# Patient Record
Sex: FEMALE | Race: WHITE | NOT HISPANIC OR LATINO
[De-identification: names, ages, dates, MRNs, and addresses within clinical notes are randomized per-mention and may not be internally consistent; named-entity substitution may affect disease eponyms.]

---

## 2017-01-31 ENCOUNTER — RX RENEWAL (OUTPATIENT)
Age: 82
End: 2017-01-31

## 2017-02-17 ENCOUNTER — NON-APPOINTMENT (OUTPATIENT)
Age: 82
End: 2017-02-17

## 2017-02-17 ENCOUNTER — APPOINTMENT (OUTPATIENT)
Dept: GERIATRICS | Facility: CLINIC | Age: 82
End: 2017-02-17

## 2017-02-17 VITALS
HEIGHT: 62 IN | TEMPERATURE: 98.7 F | SYSTOLIC BLOOD PRESSURE: 140 MMHG | BODY MASS INDEX: 25.95 KG/M2 | DIASTOLIC BLOOD PRESSURE: 80 MMHG | HEART RATE: 61 BPM | RESPIRATION RATE: 15 BRPM | WEIGHT: 141 LBS | OXYGEN SATURATION: 97 %

## 2017-02-17 DIAGNOSIS — F39 UNSPECIFIED MOOD [AFFECTIVE] DISORDER: ICD-10-CM

## 2017-02-18 ENCOUNTER — INPATIENT (INPATIENT)
Facility: HOSPITAL | Age: 82
LOS: 0 days | Discharge: HOME | DRG: 880 | End: 2017-02-19
Attending: HOSPITALIST | Admitting: HOSPITALIST
Payer: COMMERCIAL

## 2017-02-18 DIAGNOSIS — Z87.891 PERSONAL HISTORY OF NICOTINE DEPENDENCE: ICD-10-CM

## 2017-02-18 DIAGNOSIS — E03.9 HYPOTHYROIDISM, UNSPECIFIED: ICD-10-CM

## 2017-02-18 DIAGNOSIS — F41.0 PANIC DISORDER [EPISODIC PAROXYSMAL ANXIETY]: ICD-10-CM

## 2017-02-18 DIAGNOSIS — F03.90 UNSPECIFIED DEMENTIA WITHOUT BEHAVIORAL DISTURBANCE: ICD-10-CM

## 2017-02-18 DIAGNOSIS — F32.9 MAJOR DEPRESSIVE DISORDER, SINGLE EPISODE, UNSPECIFIED: ICD-10-CM

## 2017-02-18 DIAGNOSIS — F41.9 ANXIETY DISORDER, UNSPECIFIED: ICD-10-CM

## 2017-02-18 DIAGNOSIS — R47.89 OTHER SPEECH DISTURBANCES: ICD-10-CM

## 2017-02-18 PROCEDURE — 99223 1ST HOSP IP/OBS HIGH 75: CPT | Mod: AI

## 2017-02-18 PROCEDURE — 70450 CT HEAD/BRAIN W/O DYE: CPT | Mod: 26

## 2017-02-18 PROCEDURE — 71010: CPT | Mod: 26

## 2017-02-19 PROCEDURE — 83036 HEMOGLOBIN GLYCOSYLATED A1C: CPT

## 2017-02-19 PROCEDURE — 85730 THROMBOPLASTIN TIME PARTIAL: CPT

## 2017-02-19 PROCEDURE — 96374 THER/PROPH/DIAG INJ IV PUSH: CPT

## 2017-02-19 PROCEDURE — 96376 TX/PRO/DX INJ SAME DRUG ADON: CPT

## 2017-02-19 PROCEDURE — 71045 X-RAY EXAM CHEST 1 VIEW: CPT

## 2017-02-19 PROCEDURE — 80048 BASIC METABOLIC PNL TOTAL CA: CPT

## 2017-02-19 PROCEDURE — 93005 ELECTROCARDIOGRAM TRACING: CPT

## 2017-02-19 PROCEDURE — 82962 GLUCOSE BLOOD TEST: CPT

## 2017-02-19 PROCEDURE — 82550 ASSAY OF CK (CPK): CPT

## 2017-02-19 PROCEDURE — 96375 TX/PRO/DX INJ NEW DRUG ADDON: CPT

## 2017-02-19 PROCEDURE — 99239 HOSP IP/OBS DSCHRG MGMT >30: CPT

## 2017-02-19 PROCEDURE — 87086 URINE CULTURE/COLONY COUNT: CPT

## 2017-02-19 PROCEDURE — 85027 COMPLETE CBC AUTOMATED: CPT

## 2017-02-19 PROCEDURE — 81003 URINALYSIS AUTO W/O SCOPE: CPT

## 2017-02-19 PROCEDURE — 99285 EMERGENCY DEPT VISIT HI MDM: CPT | Mod: 25

## 2017-02-19 PROCEDURE — 70450 CT HEAD/BRAIN W/O DYE: CPT

## 2017-02-19 PROCEDURE — 85610 PROTHROMBIN TIME: CPT

## 2017-02-19 PROCEDURE — 84484 ASSAY OF TROPONIN QUANT: CPT

## 2017-02-19 PROCEDURE — 93010 ELECTROCARDIOGRAM REPORT: CPT

## 2017-02-22 ENCOUNTER — APPOINTMENT (OUTPATIENT)
Dept: GERIATRICS | Facility: CLINIC | Age: 82
End: 2017-02-22

## 2017-02-22 VITALS
RESPIRATION RATE: 15 BRPM | OXYGEN SATURATION: 97 % | BODY MASS INDEX: 26.87 KG/M2 | TEMPERATURE: 97.9 F | HEART RATE: 85 BPM | HEIGHT: 62 IN | DIASTOLIC BLOOD PRESSURE: 78 MMHG | WEIGHT: 146 LBS | SYSTOLIC BLOOD PRESSURE: 160 MMHG

## 2017-02-22 RX ORDER — SERTRALINE 25 MG/1
25 TABLET, FILM COATED ORAL
Qty: 30 | Refills: 1 | Status: DISCONTINUED | COMMUNITY
Start: 2017-02-17 | End: 2017-02-22

## 2017-02-23 LAB
25(OH)D3 SERPL-MCNC: 30.3 NG/ML
ANION GAP SERPL CALC-SCNC: 18 MMOL/L
APPEARANCE: CLEAR
BASOPHILS # BLD AUTO: 0.1 K/UL
BASOPHILS NFR BLD AUTO: 0.7 %
BILIRUBIN URINE: NEGATIVE
BLOOD URINE: NEGATIVE
BUN SERPL-MCNC: 14 MG/DL
CALCIUM SERPL-MCNC: 10.3 MG/DL
CHLORIDE SERPL-SCNC: 99 MMOL/L
CHOLEST SERPL-MCNC: 271 MG/DL
CHOLEST/HDLC SERPL: 4.2 RATIO
CO2 SERPL-SCNC: 25 MMOL/L
COLOR: YELLOW
CREAT SERPL-MCNC: 1.04 MG/DL
EOSINOPHIL # BLD AUTO: 0.18 K/UL
EOSINOPHIL NFR BLD AUTO: 1.3 %
FOLATE SERPL-MCNC: 10 NG/ML
GLUCOSE QUALITATIVE U: NORMAL MG/DL
GLUCOSE SERPL-MCNC: 97 MG/DL
HBA1C MFR BLD HPLC: 5.7 %
HCT VFR BLD CALC: 41.2 %
HDLC SERPL-MCNC: 65 MG/DL
HGB BLD-MCNC: 13.4 G/DL
IMM GRANULOCYTES NFR BLD AUTO: 0.2 %
KETONES URINE: NEGATIVE
LDLC SERPL CALC-MCNC: 162 MG/DL
LEUKOCYTE ESTERASE URINE: NEGATIVE
LYMPHOCYTES # BLD AUTO: 3.17 K/UL
LYMPHOCYTES NFR BLD AUTO: 23.7 %
MAGNESIUM SERPL-MCNC: 2.4 MG/DL
MAN DIFF?: NORMAL
MCHC RBC-ENTMCNC: 29.3 PG
MCHC RBC-ENTMCNC: 32.5 GM/DL
MCV RBC AUTO: 90.2 FL
MONOCYTES # BLD AUTO: 0.96 K/UL
MONOCYTES NFR BLD AUTO: 7.2 %
NEUTROPHILS # BLD AUTO: 8.91 K/UL
NEUTROPHILS NFR BLD AUTO: 66.9 %
NITRITE URINE: NEGATIVE
PH URINE: 7
PLATELET # BLD AUTO: 380 K/UL
POTASSIUM SERPL-SCNC: 4.4 MMOL/L
PROTEIN URINE: NEGATIVE MG/DL
RBC # BLD: 4.57 M/UL
RBC # FLD: 14.7 %
SODIUM SERPL-SCNC: 142 MMOL/L
SPECIFIC GRAVITY URINE: 1.01
TRIGL SERPL-MCNC: 218 MG/DL
TSH SERPL-ACNC: 3.71 UIU/ML
UROBILINOGEN URINE: NORMAL MG/DL
VIT B12 SERPL-MCNC: 536 PG/ML
WBC # FLD AUTO: 13.35 K/UL

## 2017-03-06 ENCOUNTER — APPOINTMENT (OUTPATIENT)
Dept: GERIATRICS | Facility: CLINIC | Age: 82
End: 2017-03-06

## 2017-03-06 VITALS
DIASTOLIC BLOOD PRESSURE: 80 MMHG | OXYGEN SATURATION: 98 % | BODY MASS INDEX: 26.13 KG/M2 | HEART RATE: 83 BPM | SYSTOLIC BLOOD PRESSURE: 140 MMHG | WEIGHT: 142 LBS | RESPIRATION RATE: 15 BRPM | TEMPERATURE: 98.6 F | HEIGHT: 62 IN

## 2017-03-06 DIAGNOSIS — G45.9 TRANSIENT CEREBRAL ISCHEMIC ATTACK, UNSPECIFIED: ICD-10-CM

## 2017-03-06 RX ORDER — ZOLPIDEM TARTRATE 5 MG/1
5 TABLET ORAL
Qty: 10 | Refills: 0 | Status: DISCONTINUED | COMMUNITY
Start: 2016-12-05

## 2017-03-06 RX ORDER — MIRTAZAPINE 7.5 MG/1
7.5 TABLET, FILM COATED ORAL
Qty: 15 | Refills: 0 | Status: DISCONTINUED | COMMUNITY
Start: 2017-02-02

## 2017-03-06 RX ORDER — DONEPEZIL HYDROCHLORIDE 5 MG/1
5 TABLET ORAL
Qty: 30 | Refills: 0 | Status: DISCONTINUED | COMMUNITY
Start: 2017-02-24

## 2017-03-09 ENCOUNTER — OUTPATIENT (OUTPATIENT)
Dept: OUTPATIENT SERVICES | Facility: HOSPITAL | Age: 82
LOS: 1 days | Discharge: HOME | End: 2017-03-09
Payer: MEDICARE

## 2017-03-09 PROCEDURE — 90792 PSYCH DIAG EVAL W/MED SRVCS: CPT

## 2017-03-10 DIAGNOSIS — F33.2 MAJOR DEPRESSIVE DISORDER, RECURRENT SEVERE WITHOUT PSYCHOTIC FEATURES: ICD-10-CM

## 2017-03-11 ENCOUNTER — APPOINTMENT (OUTPATIENT)
Dept: ULTRASOUND IMAGING | Facility: CLINIC | Age: 82
End: 2017-03-11

## 2017-03-11 ENCOUNTER — OUTPATIENT (OUTPATIENT)
Dept: OUTPATIENT SERVICES | Facility: HOSPITAL | Age: 82
LOS: 1 days | End: 2017-03-11
Payer: MEDICARE

## 2017-03-11 DIAGNOSIS — Z00.8 ENCOUNTER FOR OTHER GENERAL EXAMINATION: ICD-10-CM

## 2017-03-11 PROCEDURE — 93880 EXTRACRANIAL BILAT STUDY: CPT

## 2017-03-24 PROCEDURE — 99214 OFFICE O/P EST MOD 30 MIN: CPT

## 2017-04-03 ENCOUNTER — APPOINTMENT (OUTPATIENT)
Dept: GERIATRICS | Facility: CLINIC | Age: 82
End: 2017-04-03

## 2017-04-03 VITALS
OXYGEN SATURATION: 97 % | HEART RATE: 64 BPM | BODY MASS INDEX: 25.76 KG/M2 | RESPIRATION RATE: 15 BRPM | WEIGHT: 140 LBS | TEMPERATURE: 97.6 F | DIASTOLIC BLOOD PRESSURE: 80 MMHG | SYSTOLIC BLOOD PRESSURE: 152 MMHG | HEIGHT: 62 IN

## 2017-04-03 DIAGNOSIS — R61 GENERALIZED HYPERHIDROSIS: ICD-10-CM

## 2017-04-03 RX ORDER — MIRTAZAPINE 15 MG/1
15 TABLET, FILM COATED ORAL
Qty: 30 | Refills: 5 | Status: DISCONTINUED | COMMUNITY
Start: 2017-02-17 | End: 2017-04-03

## 2017-04-14 PROCEDURE — 99214 OFFICE O/P EST MOD 30 MIN: CPT

## 2017-06-14 ENCOUNTER — APPOINTMENT (OUTPATIENT)
Dept: GERIATRICS | Facility: CLINIC | Age: 82
End: 2017-06-14

## 2017-06-14 VITALS
SYSTOLIC BLOOD PRESSURE: 190 MMHG | RESPIRATION RATE: 15 BRPM | HEIGHT: 62 IN | DIASTOLIC BLOOD PRESSURE: 100 MMHG | OXYGEN SATURATION: 97 % | HEART RATE: 67 BPM | WEIGHT: 135.03 LBS | BODY MASS INDEX: 24.85 KG/M2 | TEMPERATURE: 97.9 F

## 2017-06-15 LAB
ANION GAP SERPL CALC-SCNC: 14 MMOL/L
BASOPHILS # BLD AUTO: 0.05 K/UL
BASOPHILS NFR BLD AUTO: 0.4 %
BUN SERPL-MCNC: 20 MG/DL
CALCIUM SERPL-MCNC: 9.7 MG/DL
CHLORIDE SERPL-SCNC: 100 MMOL/L
CHOLEST SERPL-MCNC: 241 MG/DL
CHOLEST/HDLC SERPL: 4 RATIO
CO2 SERPL-SCNC: 28 MMOL/L
CREAT SERPL-MCNC: 0.96 MG/DL
EOSINOPHIL # BLD AUTO: 0.16 K/UL
EOSINOPHIL NFR BLD AUTO: 1.4 %
GLUCOSE SERPL-MCNC: 89 MG/DL
HCT VFR BLD CALC: 37.9 %
HDLC SERPL-MCNC: 60 MG/DL
HGB BLD-MCNC: 12.4 G/DL
IMM GRANULOCYTES NFR BLD AUTO: 0.3 %
LDLC SERPL CALC-MCNC: 148 MG/DL
LYMPHOCYTES # BLD AUTO: 2.32 K/UL
LYMPHOCYTES NFR BLD AUTO: 20.2 %
MAN DIFF?: NORMAL
MCHC RBC-ENTMCNC: 29.1 PG
MCHC RBC-ENTMCNC: 32.7 GM/DL
MCV RBC AUTO: 89 FL
MONOCYTES # BLD AUTO: 0.74 K/UL
MONOCYTES NFR BLD AUTO: 6.4 %
NEUTROPHILS # BLD AUTO: 8.19 K/UL
NEUTROPHILS NFR BLD AUTO: 71.3 %
PLATELET # BLD AUTO: 310 K/UL
POTASSIUM SERPL-SCNC: 4.4 MMOL/L
RBC # BLD: 4.26 M/UL
RBC # FLD: 14 %
SODIUM SERPL-SCNC: 142 MMOL/L
TRIGL SERPL-MCNC: 165 MG/DL
TSH SERPL-ACNC: 3.77 UIU/ML
WBC # FLD AUTO: 11.49 K/UL

## 2017-07-07 ENCOUNTER — RX RENEWAL (OUTPATIENT)
Age: 82
End: 2017-07-07

## 2017-07-14 ENCOUNTER — APPOINTMENT (OUTPATIENT)
Dept: UROGYNECOLOGY | Facility: CLINIC | Age: 82
End: 2017-07-14

## 2017-07-14 VITALS
DIASTOLIC BLOOD PRESSURE: 72 MMHG | BODY MASS INDEX: 27.6 KG/M2 | WEIGHT: 150 LBS | SYSTOLIC BLOOD PRESSURE: 138 MMHG | HEIGHT: 62 IN

## 2017-07-14 DIAGNOSIS — Z78.9 OTHER SPECIFIED HEALTH STATUS: ICD-10-CM

## 2017-07-14 DIAGNOSIS — Z87.19 PERSONAL HISTORY OF OTHER DISEASES OF THE DIGESTIVE SYSTEM: ICD-10-CM

## 2017-07-14 DIAGNOSIS — Z80.3 FAMILY HISTORY OF MALIGNANT NEOPLASM OF BREAST: ICD-10-CM

## 2017-07-14 DIAGNOSIS — N95.2 POSTMENOPAUSAL ATROPHIC VAGINITIS: ICD-10-CM

## 2017-07-14 DIAGNOSIS — N36.41 HYPERMOBILITY OF URETHRA: ICD-10-CM

## 2017-07-14 RX ORDER — MIRTAZAPINE 30 MG/1
30 TABLET, FILM COATED ORAL
Qty: 30 | Refills: 0 | Status: DISCONTINUED | COMMUNITY
Start: 2017-02-17 | End: 2017-07-14

## 2017-07-14 RX ORDER — TRAZODONE HYDROCHLORIDE 100 MG/1
100 TABLET ORAL
Qty: 30 | Refills: 0 | Status: DISCONTINUED | COMMUNITY
Start: 2017-03-09 | End: 2017-07-14

## 2017-08-14 ENCOUNTER — APPOINTMENT (OUTPATIENT)
Dept: UROGYNECOLOGY | Facility: CLINIC | Age: 82
End: 2017-08-14
Payer: MEDICARE

## 2017-08-14 ENCOUNTER — RESULT REVIEW (OUTPATIENT)
Age: 82
End: 2017-08-14

## 2017-08-14 ENCOUNTER — OUTPATIENT (OUTPATIENT)
Dept: OUTPATIENT SERVICES | Facility: HOSPITAL | Age: 82
LOS: 1 days | End: 2017-08-14
Payer: MEDICARE

## 2017-08-14 DIAGNOSIS — Z01.818 ENCOUNTER FOR OTHER PREPROCEDURAL EXAMINATION: ICD-10-CM

## 2017-08-14 DIAGNOSIS — N39.3 STRESS INCONTINENCE (FEMALE) (MALE): ICD-10-CM

## 2017-08-14 LAB
BILIRUB UR QL STRIP: NORMAL
CLARITY UR: CLEAR
COLLECTION METHOD: NORMAL
GLUCOSE UR-MCNC: NORMAL
HCG UR QL: 0.2 EU/DL
HGB UR QL STRIP.AUTO: NORMAL
KETONES UR-MCNC: NORMAL
LEUKOCYTE ESTERASE UR QL STRIP: NORMAL
NITRITE UR QL STRIP: NORMAL
PH UR STRIP: 6.5
PROT UR STRIP-MCNC: NORMAL
SP GR UR STRIP: 1.01

## 2017-08-14 PROCEDURE — 51797 INTRAABDOMINAL PRESSURE TEST: CPT

## 2017-08-14 PROCEDURE — 51729 CYSTOMETROGRAM W/VP&UP: CPT

## 2017-08-14 PROCEDURE — 51784 ANAL/URINARY MUSCLE STUDY: CPT | Mod: 26

## 2017-08-14 PROCEDURE — 51797 INTRAABDOMINAL PRESSURE TEST: CPT | Mod: 26

## 2017-08-14 PROCEDURE — 51784 ANAL/URINARY MUSCLE STUDY: CPT

## 2017-08-14 PROCEDURE — 51729 CYSTOMETROGRAM W/VP&UP: CPT | Mod: 26

## 2017-08-15 DIAGNOSIS — N39.3 STRESS INCONTINENCE (FEMALE) (MALE): ICD-10-CM

## 2017-08-15 DIAGNOSIS — N32.81 OVERACTIVE BLADDER: ICD-10-CM

## 2017-08-23 ENCOUNTER — APPOINTMENT (OUTPATIENT)
Dept: UROGYNECOLOGY | Facility: CLINIC | Age: 82
End: 2017-08-23
Payer: MEDICARE

## 2017-08-23 DIAGNOSIS — N39.3 STRESS INCONTINENCE (FEMALE) (MALE): ICD-10-CM

## 2017-08-23 DIAGNOSIS — N32.81 OVERACTIVE BLADDER: ICD-10-CM

## 2017-08-23 PROCEDURE — 99213 OFFICE O/P EST LOW 20 MIN: CPT

## 2017-08-31 ENCOUNTER — APPOINTMENT (OUTPATIENT)
Dept: GERIATRICS | Facility: CLINIC | Age: 82
End: 2017-08-31
Payer: MEDICARE

## 2017-08-31 VITALS
SYSTOLIC BLOOD PRESSURE: 140 MMHG | BODY MASS INDEX: 24.69 KG/M2 | OXYGEN SATURATION: 97 % | HEART RATE: 70 BPM | TEMPERATURE: 98.4 F | RESPIRATION RATE: 16 BRPM | WEIGHT: 135 LBS | DIASTOLIC BLOOD PRESSURE: 80 MMHG

## 2017-08-31 DIAGNOSIS — N39.46 MIXED INCONTINENCE: ICD-10-CM

## 2017-08-31 PROCEDURE — 99214 OFFICE O/P EST MOD 30 MIN: CPT | Mod: GC

## 2017-08-31 RX ORDER — ASPIRIN 81 MG/1
81 TABLET, COATED ORAL DAILY
Qty: 1 | Refills: 0 | Status: ACTIVE | COMMUNITY
Start: 2017-04-03

## 2017-09-06 LAB
BASOPHILS # BLD AUTO: 0.08 K/UL
BASOPHILS NFR BLD AUTO: 0.7 %
EOSINOPHIL # BLD AUTO: 0.12 K/UL
EOSINOPHIL NFR BLD AUTO: 1 %
HCT VFR BLD CALC: 38.9 %
HGB BLD-MCNC: 12.5 G/DL
IMM GRANULOCYTES NFR BLD AUTO: 0.3 %
LYMPHOCYTES # BLD AUTO: 2.51 K/UL
LYMPHOCYTES NFR BLD AUTO: 21.3 %
MAN DIFF?: NORMAL
MCHC RBC-ENTMCNC: 28.3 PG
MCHC RBC-ENTMCNC: 32.1 GM/DL
MCV RBC AUTO: 88 FL
MONOCYTES # BLD AUTO: 0.84 K/UL
MONOCYTES NFR BLD AUTO: 7.1 %
NEUTROPHILS # BLD AUTO: 8.22 K/UL
NEUTROPHILS NFR BLD AUTO: 69.6 %
PLATELET # BLD AUTO: 322 K/UL
RBC # BLD: 4.42 M/UL
RBC # FLD: 13.8 %
WBC # FLD AUTO: 11.81 K/UL

## 2017-09-21 ENCOUNTER — RX RENEWAL (OUTPATIENT)
Age: 82
End: 2017-09-21

## 2017-09-28 PROCEDURE — 99214 OFFICE O/P EST MOD 30 MIN: CPT

## 2017-10-13 ENCOUNTER — RX RENEWAL (OUTPATIENT)
Age: 82
End: 2017-10-13

## 2017-10-30 PROCEDURE — 99214 OFFICE O/P EST MOD 30 MIN: CPT

## 2017-11-13 ENCOUNTER — APPOINTMENT (OUTPATIENT)
Dept: GERIATRICS | Facility: CLINIC | Age: 82
End: 2017-11-13

## 2017-11-20 ENCOUNTER — APPOINTMENT (OUTPATIENT)
Dept: GERIATRICS | Facility: CLINIC | Age: 82
End: 2017-11-20
Payer: MEDICARE

## 2017-11-20 VITALS
SYSTOLIC BLOOD PRESSURE: 130 MMHG | OXYGEN SATURATION: 98 % | TEMPERATURE: 97.8 F | HEIGHT: 62 IN | DIASTOLIC BLOOD PRESSURE: 70 MMHG | BODY MASS INDEX: 24.84 KG/M2 | WEIGHT: 135 LBS | RESPIRATION RATE: 16 BRPM | HEART RATE: 64 BPM

## 2017-11-20 PROCEDURE — 90662 IIV NO PRSV INCREASED AG IM: CPT

## 2017-11-20 PROCEDURE — 99214 OFFICE O/P EST MOD 30 MIN: CPT | Mod: 25

## 2017-11-20 PROCEDURE — G0008: CPT

## 2017-11-20 RX ORDER — ATORVASTATIN CALCIUM 20 MG/1
20 TABLET, FILM COATED ORAL
Qty: 30 | Refills: 3 | Status: DISCONTINUED | COMMUNITY
Start: 2017-08-31 | End: 2017-11-20

## 2017-11-22 ENCOUNTER — MED ADMIN CHARGE (OUTPATIENT)
Age: 82
End: 2017-11-22

## 2017-11-30 PROCEDURE — 99214 OFFICE O/P EST MOD 30 MIN: CPT

## 2017-12-05 ENCOUNTER — APPOINTMENT (OUTPATIENT)
Dept: GERIATRICS | Facility: ASSISTED LIVING FACILITY | Age: 82
End: 2017-12-05

## 2017-12-13 ENCOUNTER — TRANSCRIPTION ENCOUNTER (OUTPATIENT)
Age: 82
End: 2017-12-13

## 2018-01-08 ENCOUNTER — APPOINTMENT (OUTPATIENT)
Dept: GERIATRICS | Facility: CLINIC | Age: 83
End: 2018-01-08
Payer: MEDICARE

## 2018-01-08 VITALS
BODY MASS INDEX: 27.08 KG/M2 | WEIGHT: 147.13 LBS | SYSTOLIC BLOOD PRESSURE: 140 MMHG | TEMPERATURE: 98.6 F | HEIGHT: 62 IN | DIASTOLIC BLOOD PRESSURE: 80 MMHG | RESPIRATION RATE: 16 BRPM | OXYGEN SATURATION: 97 % | HEART RATE: 72 BPM

## 2018-01-08 PROCEDURE — 99214 OFFICE O/P EST MOD 30 MIN: CPT

## 2018-01-12 ENCOUNTER — RESULT REVIEW (OUTPATIENT)
Age: 83
End: 2018-01-12

## 2018-01-12 LAB
ANION GAP SERPL CALC-SCNC: 12 MMOL/L
BASOPHILS # BLD AUTO: 0.07 K/UL
BASOPHILS NFR BLD AUTO: 0.7 %
BUN SERPL-MCNC: 21 MG/DL
CALCIUM SERPL-MCNC: 9.8 MG/DL
CHLORIDE SERPL-SCNC: 97 MMOL/L
CHOLEST SERPL-MCNC: 316 MG/DL
CHOLEST/HDLC SERPL: 5.1 RATIO
CO2 SERPL-SCNC: 31 MMOL/L
CREAT SERPL-MCNC: 1.24 MG/DL
EOSINOPHIL # BLD AUTO: 0.18 K/UL
EOSINOPHIL NFR BLD AUTO: 1.7 %
GLUCOSE SERPL-MCNC: 108 MG/DL
HCT VFR BLD CALC: 36.7 %
HDLC SERPL-MCNC: 62 MG/DL
HGB BLD-MCNC: 12 G/DL
IMM GRANULOCYTES NFR BLD AUTO: 0.4 %
LDLC SERPL CALC-MCNC: 190 MG/DL
LYMPHOCYTES # BLD AUTO: 2.72 K/UL
LYMPHOCYTES NFR BLD AUTO: 26.1 %
MAN DIFF?: NORMAL
MCHC RBC-ENTMCNC: 30.5 PG
MCHC RBC-ENTMCNC: 32.7 GM/DL
MCV RBC AUTO: 93.4 FL
MONOCYTES # BLD AUTO: 0.82 K/UL
MONOCYTES NFR BLD AUTO: 7.9 %
NEUTROPHILS # BLD AUTO: 6.6 K/UL
NEUTROPHILS NFR BLD AUTO: 63.2 %
PLATELET # BLD AUTO: 267 K/UL
POTASSIUM SERPL-SCNC: 4.4 MMOL/L
RBC # BLD: 3.93 M/UL
RBC # FLD: 14.7 %
SODIUM SERPL-SCNC: 140 MMOL/L
TRIGL SERPL-MCNC: 322 MG/DL
WBC # FLD AUTO: 10.43 K/UL

## 2018-01-23 ENCOUNTER — TRANSCRIPTION ENCOUNTER (OUTPATIENT)
Age: 83
End: 2018-01-23

## 2018-02-05 ENCOUNTER — RX RENEWAL (OUTPATIENT)
Age: 83
End: 2018-02-05

## 2018-02-12 ENCOUNTER — TRANSCRIPTION ENCOUNTER (OUTPATIENT)
Age: 83
End: 2018-02-12

## 2018-02-21 ENCOUNTER — OTHER (OUTPATIENT)
Age: 83
End: 2018-02-21

## 2018-02-21 RX ORDER — GABAPENTIN 300 MG/1
300 CAPSULE ORAL
Qty: 30 | Refills: 0 | Status: DISCONTINUED | COMMUNITY
Start: 2017-12-14 | End: 2018-02-21

## 2018-04-09 ENCOUNTER — APPOINTMENT (OUTPATIENT)
Dept: GERIATRICS | Facility: CLINIC | Age: 83
End: 2018-04-09
Payer: MEDICARE

## 2018-04-09 VITALS
SYSTOLIC BLOOD PRESSURE: 170 MMHG | OXYGEN SATURATION: 97 % | RESPIRATION RATE: 16 BRPM | WEIGHT: 140 LBS | HEART RATE: 72 BPM | DIASTOLIC BLOOD PRESSURE: 90 MMHG | BODY MASS INDEX: 25.76 KG/M2 | HEIGHT: 62 IN | TEMPERATURE: 98.3 F

## 2018-04-09 PROCEDURE — 99214 OFFICE O/P EST MOD 30 MIN: CPT | Mod: GC

## 2018-04-09 RX ORDER — TRAZODONE HYDROCHLORIDE 50 MG/1
50 TABLET ORAL
Qty: 60 | Refills: 0 | Status: DISCONTINUED | COMMUNITY
Start: 2017-11-20

## 2018-04-09 RX ORDER — DULOXETINE HYDROCHLORIDE 60 MG/1
60 CAPSULE, DELAYED RELEASE PELLETS ORAL
Qty: 60 | Refills: 0 | Status: DISCONTINUED | COMMUNITY
Start: 2017-11-30

## 2018-04-09 RX ORDER — NYSTATIN 100000 1/G
100000 POWDER TOPICAL
Qty: 1 | Refills: 0 | Status: DISCONTINUED | COMMUNITY
Start: 2017-04-03 | End: 2018-04-09

## 2018-04-09 RX ORDER — AMOXICILLIN 875 MG/1
875 TABLET, FILM COATED ORAL
Qty: 20 | Refills: 0 | Status: DISCONTINUED | COMMUNITY
Start: 2018-02-12

## 2018-04-09 RX ORDER — VALACYCLOVIR 1 G/1
1 TABLET, FILM COATED ORAL
Qty: 21 | Refills: 0 | Status: DISCONTINUED | COMMUNITY
Start: 2017-12-05

## 2018-04-09 RX ORDER — EZETIMIBE 10 MG/1
10 TABLET ORAL DAILY
Qty: 30 | Refills: 3 | Status: DISCONTINUED | COMMUNITY
Start: 2018-01-12 | End: 2018-04-09

## 2018-04-13 LAB
ALBUMIN SERPL ELPH-MCNC: 4 G/DL
ALP BLD-CCNC: 48 U/L
ALT SERPL-CCNC: 8 U/L
ANION GAP SERPL CALC-SCNC: 11 MMOL/L
AST SERPL-CCNC: 14 U/L
BASOPHILS # BLD AUTO: 0.07 K/UL
BASOPHILS NFR BLD AUTO: 0.6 %
BILIRUB SERPL-MCNC: 0.6 MG/DL
BUN SERPL-MCNC: 18 MG/DL
CALCIUM SERPL-MCNC: 9.5 MG/DL
CHLORIDE SERPL-SCNC: 103 MMOL/L
CHOLEST SERPL-MCNC: 225 MG/DL
CHOLEST/HDLC SERPL: 3.9 RATIO
CO2 SERPL-SCNC: 26 MMOL/L
CREAT SERPL-MCNC: 1.11 MG/DL
EOSINOPHIL # BLD AUTO: 0.09 K/UL
EOSINOPHIL NFR BLD AUTO: 0.8 %
GLUCOSE SERPL-MCNC: 101 MG/DL
HCT VFR BLD CALC: 39.2 %
HDLC SERPL-MCNC: 58 MG/DL
HGB BLD-MCNC: 12.7 G/DL
IMM GRANULOCYTES NFR BLD AUTO: 0.4 %
LDLC SERPL CALC-MCNC: 141 MG/DL
LYMPHOCYTES # BLD AUTO: 2.66 K/UL
LYMPHOCYTES NFR BLD AUTO: 24.2 %
MAN DIFF?: NORMAL
MCHC RBC-ENTMCNC: 29.9 PG
MCHC RBC-ENTMCNC: 32.4 GM/DL
MCV RBC AUTO: 92.2 FL
MONOCYTES # BLD AUTO: 0.82 K/UL
MONOCYTES NFR BLD AUTO: 7.5 %
NEUTROPHILS # BLD AUTO: 7.3 K/UL
NEUTROPHILS NFR BLD AUTO: 66.5 %
PLATELET # BLD AUTO: 351 K/UL
POTASSIUM SERPL-SCNC: 4.3 MMOL/L
PROT SERPL-MCNC: 7 G/DL
RBC # BLD: 4.25 M/UL
RBC # FLD: 13.1 %
SODIUM SERPL-SCNC: 140 MMOL/L
TRIGL SERPL-MCNC: 130 MG/DL
TSH SERPL-ACNC: 8.29 UIU/ML
WBC # FLD AUTO: 10.98 K/UL

## 2018-04-16 ENCOUNTER — RX RENEWAL (OUTPATIENT)
Age: 83
End: 2018-04-16

## 2018-06-11 ENCOUNTER — APPOINTMENT (OUTPATIENT)
Dept: GERIATRICS | Facility: CLINIC | Age: 83
End: 2018-06-11
Payer: MEDICARE

## 2018-06-11 VITALS
BODY MASS INDEX: 24.47 KG/M2 | OXYGEN SATURATION: 97 % | TEMPERATURE: 98.4 F | HEART RATE: 64 BPM | SYSTOLIC BLOOD PRESSURE: 160 MMHG | WEIGHT: 133.8 LBS | DIASTOLIC BLOOD PRESSURE: 80 MMHG

## 2018-06-11 DIAGNOSIS — Z23 ENCOUNTER FOR IMMUNIZATION: ICD-10-CM

## 2018-06-11 DIAGNOSIS — Z86.39 PERSONAL HISTORY OF OTHER ENDOCRINE, NUTRITIONAL AND METABOLIC DISEASE: ICD-10-CM

## 2018-06-11 DIAGNOSIS — Z87.39 PERSONAL HISTORY OF OTHER DISEASES OF THE MUSCULOSKELETAL SYSTEM AND CONNECTIVE TISSUE: ICD-10-CM

## 2018-06-11 DIAGNOSIS — Z92.29 PERSONAL HISTORY OF OTHER DRUG THERAPY: ICD-10-CM

## 2018-06-11 DIAGNOSIS — B02.29 OTHER POSTHERPETIC NERVOUS SYSTEM INVOLVEMENT: ICD-10-CM

## 2018-06-11 PROCEDURE — 99214 OFFICE O/P EST MOD 30 MIN: CPT

## 2018-06-12 ENCOUNTER — MEDICATION RENEWAL (OUTPATIENT)
Age: 83
End: 2018-06-12

## 2018-06-12 LAB
ANION GAP SERPL CALC-SCNC: 15 MMOL/L
BUN SERPL-MCNC: 16 MG/DL
CALCIUM SERPL-MCNC: 9.7 MG/DL
CHLORIDE SERPL-SCNC: 101 MMOL/L
CHOLEST SERPL-MCNC: 192 MG/DL
CHOLEST/HDLC SERPL: 3 RATIO
CO2 SERPL-SCNC: 25 MMOL/L
CREAT SERPL-MCNC: 1.11 MG/DL
GLUCOSE SERPL-MCNC: 97 MG/DL
HDLC SERPL-MCNC: 63 MG/DL
LDLC SERPL CALC-MCNC: 105 MG/DL
LDLC SERPL DIRECT ASSAY-MCNC: 113 MG/DL
POTASSIUM SERPL-SCNC: 4.1 MMOL/L
SODIUM SERPL-SCNC: 141 MMOL/L
TRIGL SERPL-MCNC: 122 MG/DL
TSH SERPL-ACNC: 13.08 UIU/ML

## 2018-07-22 PROBLEM — N39.3 STRESS INCONTINENCE: Status: ACTIVE | Noted: 2017-08-14

## 2018-08-20 ENCOUNTER — RX RENEWAL (OUTPATIENT)
Age: 83
End: 2018-08-20

## 2018-11-05 ENCOUNTER — APPOINTMENT (OUTPATIENT)
Dept: GERIATRICS | Facility: CLINIC | Age: 83
End: 2018-11-05
Payer: MEDICARE

## 2018-11-05 ENCOUNTER — LABORATORY RESULT (OUTPATIENT)
Age: 83
End: 2018-11-05

## 2018-11-05 VITALS
TEMPERATURE: 98.3 F | HEIGHT: 62 IN | WEIGHT: 146 LBS | SYSTOLIC BLOOD PRESSURE: 140 MMHG | OXYGEN SATURATION: 76 % | DIASTOLIC BLOOD PRESSURE: 70 MMHG | BODY MASS INDEX: 26.87 KG/M2 | HEART RATE: 80 BPM | RESPIRATION RATE: 15 BRPM

## 2018-11-05 PROCEDURE — 99214 OFFICE O/P EST MOD 30 MIN: CPT

## 2018-11-09 ENCOUNTER — OTHER (OUTPATIENT)
Age: 83
End: 2018-11-09

## 2018-11-09 LAB
ANION GAP SERPL CALC-SCNC: 13 MMOL/L
BASOPHILS # BLD AUTO: 0.04 K/UL
BASOPHILS NFR BLD AUTO: 0.3 %
BUN SERPL-MCNC: 18 MG/DL
CALCIUM SERPL-MCNC: 10.1 MG/DL
CHLORIDE SERPL-SCNC: 100 MMOL/L
CO2 SERPL-SCNC: 27 MMOL/L
CREAT SERPL-MCNC: 1.03 MG/DL
EOSINOPHIL # BLD AUTO: 0.12 K/UL
EOSINOPHIL NFR BLD AUTO: 0.9 %
GLUCOSE SERPL-MCNC: 107 MG/DL
HCT VFR BLD CALC: 40.9 %
HGB BLD-MCNC: 13.6 G/DL
IMM GRANULOCYTES NFR BLD AUTO: 0.3 %
LYMPHOCYTES # BLD AUTO: 2.97 K/UL
LYMPHOCYTES NFR BLD AUTO: 23.3 %
MAN DIFF?: NORMAL
MCHC RBC-ENTMCNC: 29.3 PG
MCHC RBC-ENTMCNC: 33.3 GM/DL
MCV RBC AUTO: 88.1 FL
MONOCYTES # BLD AUTO: 0.89 K/UL
MONOCYTES NFR BLD AUTO: 7 %
NEUTROPHILS # BLD AUTO: 8.71 K/UL
NEUTROPHILS NFR BLD AUTO: 68.2 %
PLATELET # BLD AUTO: 338 K/UL
POTASSIUM SERPL-SCNC: 3.5 MMOL/L
RBC # BLD: 4.64 M/UL
RBC # FLD: 14 %
SODIUM SERPL-SCNC: 140 MMOL/L
TSH SERPL-ACNC: 13.71 UIU/ML
WBC # FLD AUTO: 12.77 K/UL

## 2018-11-09 RX ORDER — DULOXETINE HYDROCHLORIDE 30 MG/1
30 CAPSULE, DELAYED RELEASE PELLETS ORAL
Qty: 30 | Refills: 0 | Status: DISCONTINUED | COMMUNITY
Start: 2017-04-03 | End: 2018-11-09

## 2018-11-09 RX ORDER — DONEPEZIL HYDROCHLORIDE 5 MG/1
5 TABLET ORAL
Qty: 30 | Refills: 2 | Status: DISCONTINUED | COMMUNITY
Start: 2018-11-05 | End: 2018-11-09

## 2018-11-28 ENCOUNTER — APPOINTMENT (OUTPATIENT)
Dept: DERMATOLOGY | Facility: CLINIC | Age: 83
End: 2018-11-28
Payer: MEDICARE

## 2018-11-28 ENCOUNTER — LABORATORY RESULT (OUTPATIENT)
Age: 83
End: 2018-11-28

## 2018-11-28 VITALS
WEIGHT: 135 LBS | SYSTOLIC BLOOD PRESSURE: 150 MMHG | HEIGHT: 61 IN | DIASTOLIC BLOOD PRESSURE: 80 MMHG | BODY MASS INDEX: 25.49 KG/M2

## 2018-11-28 DIAGNOSIS — L82.1 OTHER SEBORRHEIC KERATOSIS: ICD-10-CM

## 2018-11-28 DIAGNOSIS — Z86.59 PERSONAL HISTORY OF OTHER MENTAL AND BEHAVIORAL DISORDERS: ICD-10-CM

## 2018-11-28 DIAGNOSIS — D48.9 NEOPLASM OF UNCERTAIN BEHAVIOR, UNSPECIFIED: ICD-10-CM

## 2018-11-28 PROCEDURE — 99202 OFFICE O/P NEW SF 15 MIN: CPT | Mod: 25

## 2018-11-28 PROCEDURE — 11100 BX SKIN SUBCUTANEOUS&/MUCOUS MEMBRANE 1 LESION: CPT

## 2018-12-03 ENCOUNTER — RX RENEWAL (OUTPATIENT)
Age: 83
End: 2018-12-03

## 2018-12-05 ENCOUNTER — RX RENEWAL (OUTPATIENT)
Age: 83
End: 2018-12-05

## 2018-12-17 ENCOUNTER — APPOINTMENT (OUTPATIENT)
Dept: GERIATRICS | Facility: CLINIC | Age: 83
End: 2018-12-17

## 2019-01-09 ENCOUNTER — APPOINTMENT (OUTPATIENT)
Dept: DERMATOLOGY | Facility: CLINIC | Age: 84
End: 2019-01-09
Payer: MEDICARE

## 2019-01-09 PROCEDURE — 99214 OFFICE O/P EST MOD 30 MIN: CPT

## 2019-01-09 NOTE — PHYSICAL EXAM
[Alert] : alert [Oriented x 3] : ~L oriented x 3 [Well Nourished] : well nourished [Conjunctiva Non-injected] : conjunctiva non-injected [No Visual Lymphadenopathy] : no visual  lymphadenopathy [No Clubbing] : no clubbing [No Edema] : no edema [No Bromhidrosis] : no bromhidrosis [No Chromhidrosis] : no chromhidrosis [FreeTextEntry3] : 2.5 x 1.5 cm pink plaque with rolled borders on mid frontal scalp

## 2019-01-09 NOTE — HISTORY OF PRESENT ILLNESS
[FreeTextEntry1] : new patient, referred by Dr. Cummings for nodular BCC of the mid frontal scalp [de-identified] : Mohs Consultation Date: 01/09/2019 \par  \par Referral by: Dr. Cummings\par  \par 82 y/o F here for Mohs surgery consultation for biopsy proven nodular BCC of the mid frontal scalp. \par Bleeding and present for years prior to biopsy. Denies itching/burning/pain. No associated symptoms. No exacerbating/relieving factors. No treatment tried. \par Has difficulty with memory. \par  \par History of skin cancer: none\par  \par SH: [place] farm in [place], has 4 daughters. 8 grandkids. Here today with her daughter, (?)Tracey, who she lives with. \par  \par Pertinent positives noted below\par History of HIV or hepatitis: N\par Blood thinners: N\par Antibiotic prophylaxis: N\par Medical implants: N\par

## 2019-01-14 ENCOUNTER — TRANSCRIPTION ENCOUNTER (OUTPATIENT)
Age: 84
End: 2019-01-14

## 2019-01-14 ENCOUNTER — APPOINTMENT (OUTPATIENT)
Dept: DERMATOLOGY | Facility: CLINIC | Age: 84
End: 2019-01-14
Payer: MEDICARE

## 2019-01-14 PROCEDURE — 17311 MOHS 1 STAGE H/N/HF/G: CPT

## 2019-01-14 PROCEDURE — 12031 INTMD RPR S/A/T/EXT 2.5 CM/<: CPT

## 2019-01-14 NOTE — HISTORY OF PRESENT ILLNESS
[FreeTextEntry1] : f/u Mohs nodular BCC of the mid frontal scalp [de-identified] : Mohs Consultation Date: 01/09/2019 \par Mohs Surgery Date: 01/14/2019\par  \par Referral by: Dr. Cummings\par  \par 84 y/o F here for Mohs surgery for biopsy proven nodular BCC of the mid frontal scalp.\par  \par Bleeding and present for years prior to biopsy. Denies itching/burning/pain. No associated symptoms. No exacerbating/relieving factors. No treatment tried. \par Has difficulty with memory. \par  \par History of skin cancer: none\par  \par SH: Grew up on a farm in [place], has 4 daughters. 8 grandkids. Here today with her daughter, (?)Tracey, who she lives with. \par  \par Pertinent positives noted below\par History of HIV or hepatitis: N\par Blood thinners: N\par Antibiotic prophylaxis: N\par Medical implants: N\par

## 2019-02-03 ENCOUNTER — EMERGENCY (EMERGENCY)
Facility: HOSPITAL | Age: 84
LOS: 1 days | Discharge: HOME | End: 2019-02-03
Attending: INTERNAL MEDICINE | Admitting: INTERNAL MEDICINE
Payer: MEDICARE

## 2019-02-03 VITALS
SYSTOLIC BLOOD PRESSURE: 125 MMHG | DIASTOLIC BLOOD PRESSURE: 89 MMHG | OXYGEN SATURATION: 97 % | HEART RATE: 87 BPM | RESPIRATION RATE: 19 BRPM

## 2019-02-03 VITALS
DIASTOLIC BLOOD PRESSURE: 74 MMHG | RESPIRATION RATE: 25 BRPM | HEART RATE: 84 BPM | SYSTOLIC BLOOD PRESSURE: 150 MMHG | WEIGHT: 145.06 LBS | TEMPERATURE: 101 F | OXYGEN SATURATION: 99 % | HEIGHT: 61 IN

## 2019-02-03 LAB
ALBUMIN SERPL ELPH-MCNC: 3.6 G/DL — SIGNIFICANT CHANGE UP (ref 3.3–5)
ALP SERPL-CCNC: 62 U/L — SIGNIFICANT CHANGE UP (ref 40–120)
ALT FLD-CCNC: 15 U/L DA — SIGNIFICANT CHANGE UP (ref 10–45)
ANION GAP SERPL CALC-SCNC: 13 MMOL/L — SIGNIFICANT CHANGE UP (ref 5–17)
APTT BLD: 29 SEC — SIGNIFICANT CHANGE UP (ref 27.5–36.3)
AST SERPL-CCNC: 20 U/L — SIGNIFICANT CHANGE UP (ref 10–40)
BILIRUB SERPL-MCNC: 0.5 MG/DL — SIGNIFICANT CHANGE UP (ref 0.2–1.2)
BUN SERPL-MCNC: 17 MG/DL — SIGNIFICANT CHANGE UP (ref 7–23)
CALCIUM SERPL-MCNC: 9.4 MG/DL — SIGNIFICANT CHANGE UP (ref 8.4–10.5)
CHLORIDE SERPL-SCNC: 101 MMOL/L — SIGNIFICANT CHANGE UP (ref 96–108)
CO2 SERPL-SCNC: 27 MMOL/L — SIGNIFICANT CHANGE UP (ref 22–31)
CREAT SERPL-MCNC: 1.2 MG/DL — SIGNIFICANT CHANGE UP (ref 0.5–1.3)
D DIMER BLD IA.RAPID-MCNC: 411 NG/ML DDU — HIGH
FLU A RESULT: SIGNIFICANT CHANGE UP
FLU A RESULT: SIGNIFICANT CHANGE UP
FLUAV AG NPH QL: SIGNIFICANT CHANGE UP
FLUBV AG NPH QL: SIGNIFICANT CHANGE UP
GLUCOSE SERPL-MCNC: 155 MG/DL — HIGH (ref 70–99)
HCT VFR BLD CALC: 38.2 % — SIGNIFICANT CHANGE UP (ref 34.5–45)
HGB BLD-MCNC: 13.2 G/DL — SIGNIFICANT CHANGE UP (ref 11.5–15.5)
INR BLD: 0.96 RATIO — SIGNIFICANT CHANGE UP (ref 0.88–1.16)
LACTATE SERPL-SCNC: 1.3 MMOL/L — SIGNIFICANT CHANGE UP (ref 0.7–2)
LACTATE SERPL-SCNC: 2.2 MMOL/L — HIGH (ref 0.7–2)
MCHC RBC-ENTMCNC: 30.8 PG — SIGNIFICANT CHANGE UP (ref 27–34)
MCHC RBC-ENTMCNC: 34.7 GM/DL — SIGNIFICANT CHANGE UP (ref 32–36)
MCV RBC AUTO: 88.8 FL — SIGNIFICANT CHANGE UP (ref 80–100)
PLATELET # BLD AUTO: 259 K/UL — SIGNIFICANT CHANGE UP (ref 150–400)
POTASSIUM SERPL-MCNC: 3.1 MMOL/L — LOW (ref 3.5–5.3)
POTASSIUM SERPL-SCNC: 3.1 MMOL/L — LOW (ref 3.5–5.3)
PROT SERPL-MCNC: 7.2 G/DL — SIGNIFICANT CHANGE UP (ref 6–8.3)
PROTHROM AB SERPL-ACNC: 10.7 SEC — SIGNIFICANT CHANGE UP (ref 10–12.9)
RBC # BLD: 4.3 M/UL — SIGNIFICANT CHANGE UP (ref 3.8–5.2)
RBC # FLD: 12.3 % — SIGNIFICANT CHANGE UP (ref 10.3–14.5)
RSV RESULT: SIGNIFICANT CHANGE UP
RSV RNA RESP QL NAA+PROBE: SIGNIFICANT CHANGE UP
SODIUM SERPL-SCNC: 141 MMOL/L — SIGNIFICANT CHANGE UP (ref 135–145)
TROPONIN I SERPL-MCNC: 0.04 NG/ML — SIGNIFICANT CHANGE UP (ref 0.02–0.06)
TROPONIN I SERPL-MCNC: 0.04 NG/ML — SIGNIFICANT CHANGE UP (ref 0.02–0.06)
WBC # BLD: 10.3 K/UL — SIGNIFICANT CHANGE UP (ref 3.8–10.5)
WBC # FLD AUTO: 10.3 K/UL — SIGNIFICANT CHANGE UP (ref 3.8–10.5)

## 2019-02-03 PROCEDURE — 71045 X-RAY EXAM CHEST 1 VIEW: CPT | Mod: 26

## 2019-02-03 PROCEDURE — 93010 ELECTROCARDIOGRAM REPORT: CPT

## 2019-02-03 PROCEDURE — 71275 CT ANGIOGRAPHY CHEST: CPT | Mod: 26

## 2019-02-03 PROCEDURE — 99285 EMERGENCY DEPT VISIT HI MDM: CPT

## 2019-02-03 RX ORDER — IPRATROPIUM/ALBUTEROL SULFATE 18-103MCG
3 AEROSOL WITH ADAPTER (GRAM) INHALATION
Qty: 0 | Refills: 0 | Status: COMPLETED | OUTPATIENT
Start: 2019-02-03 | End: 2019-02-03

## 2019-02-03 RX ORDER — LEVOTHYROXINE SODIUM 125 MCG
1 TABLET ORAL
Qty: 0 | Refills: 0 | COMMUNITY

## 2019-02-03 RX ORDER — DEXAMETHASONE 0.5 MG/5ML
10 ELIXIR ORAL ONCE
Qty: 0 | Refills: 0 | Status: COMPLETED | OUTPATIENT
Start: 2019-02-03 | End: 2019-02-03

## 2019-02-03 RX ORDER — TRAZODONE HCL 50 MG
0 TABLET ORAL
Qty: 0 | Refills: 0 | COMMUNITY

## 2019-02-03 RX ORDER — GABAPENTIN 400 MG/1
0 CAPSULE ORAL
Qty: 0 | Refills: 0 | COMMUNITY

## 2019-02-03 RX ORDER — ASPIRIN/CALCIUM CARB/MAGNESIUM 324 MG
0 TABLET ORAL
Qty: 0 | Refills: 0 | COMMUNITY

## 2019-02-03 RX ORDER — ACETAMINOPHEN 500 MG
650 TABLET ORAL ONCE
Qty: 0 | Refills: 0 | Status: COMPLETED | OUTPATIENT
Start: 2019-02-03 | End: 2019-02-03

## 2019-02-03 RX ORDER — IPRATROPIUM/ALBUTEROL SULFATE 18-103MCG
1 AEROSOL WITH ADAPTER (GRAM) INHALATION
Qty: 1 | Refills: 0 | OUTPATIENT
Start: 2019-02-03 | End: 2019-03-04

## 2019-02-03 RX ORDER — SODIUM CHLORIDE 9 MG/ML
2000 INJECTION INTRAMUSCULAR; INTRAVENOUS; SUBCUTANEOUS ONCE
Qty: 0 | Refills: 0 | Status: COMPLETED | OUTPATIENT
Start: 2019-02-03 | End: 2019-02-03

## 2019-02-03 RX ADMIN — SODIUM CHLORIDE 2000 MILLILITER(S): 9 INJECTION INTRAMUSCULAR; INTRAVENOUS; SUBCUTANEOUS at 06:00

## 2019-02-03 RX ADMIN — SODIUM CHLORIDE 2000 MILLILITER(S): 9 INJECTION INTRAMUSCULAR; INTRAVENOUS; SUBCUTANEOUS at 04:12

## 2019-02-03 RX ADMIN — Medication 0.5 MILLIGRAM(S): at 04:22

## 2019-02-03 RX ADMIN — Medication 3 MILLILITER(S): at 05:40

## 2019-02-03 RX ADMIN — Medication 650 MILLIGRAM(S): at 05:13

## 2019-02-03 RX ADMIN — Medication 102 MILLIGRAM(S): at 07:13

## 2019-02-03 RX ADMIN — Medication 3 MILLILITER(S): at 05:20

## 2019-02-03 RX ADMIN — Medication 100 MILLIGRAM(S): at 05:53

## 2019-02-03 RX ADMIN — Medication 110 MILLIGRAM(S): at 04:53

## 2019-02-03 RX ADMIN — Medication 10 MILLIGRAM(S): at 07:35

## 2019-02-03 RX ADMIN — Medication 650 MILLIGRAM(S): at 04:13

## 2019-02-03 NOTE — ED PROVIDER NOTE - TOBACCO USE
Former smoker no pedal edema/normal/clubbing/no clubbing/no cyanosis/cyanosis no pedal edema/cyanosis

## 2019-02-03 NOTE — ED ADULT NURSE NOTE - RESPIRATORY ASSESSMENT
WDL Alert and oriented to person, place, time/situation. normal mood and affect. no apparent risk to self or others.

## 2019-02-03 NOTE — ED PROVIDER NOTE - OBJECTIVE STATEMENT
sob cough low grade fever, happened before hx of anxiety disorder sob cough low grade fever, has  happened before  pt has a hx of anxiety disorder

## 2019-02-03 NOTE — ED ADULT TRIAGE NOTE - CHIEF COMPLAINT QUOTE
"I can't breathe".  Pt woke up with difficulty breathing, able to speak in full sentences, appears air hungry.

## 2019-02-03 NOTE — ED ADULT NURSE NOTE - OBJECTIVE STATEMENT
Patient AAOx3 accompanied by son-in-law with c/o of shortness of breath since 3 am. Patient reports taking oral Ativan 0.5 mg prior to arrival. Patient presents anxious

## 2019-02-03 NOTE — ED ADULT NURSE REASSESSMENT NOTE - NS ED NURSE REASSESS COMMENT FT1
Sepsis protocol initiated. Blood culture, blood, and lactate sent for analysis. IV bolus fluid in progress. Cardiac monitor in placed. Patient denies pain or discomfort at this time. Patient's son in law at bedside. Will continue to monitor.

## 2019-02-03 NOTE — ED PROVIDER NOTE - PHYSICAL EXAMINATION
General:     NAD, well-nourished, well-appearing  Head:     NC/AT, EOMI, oral mucosa moist  heent bilateral nasal congestion  Neck:     trachea midline  Lungs:     CTA b/l, no w/r/r  CVS:     S1S2, RRR, no m/g/r  Abd:     +BS, s/nt/nd, no organomegaly  Ext:    2+ radial and pedal pulses, no c/c/e  Neuro: AAOx3, no sensory/motor deficits

## 2019-02-03 NOTE — ED PROVIDER NOTE - CARE PLAN
Principal Discharge DX:	Acute anxiety Principal Discharge DX:	Acute anxiety  Secondary Diagnosis:	Acute bronchitis, unspecified organism

## 2019-02-03 NOTE — ED ADULT NURSE NOTE - NSIMPLEMENTINTERV_GEN_ALL_ED
Implemented All Fall with Harm Risk Interventions:  [place] to call system. Call bell, personal items and telephone within reach. Instruct patient to call for assistance. Room bathroom lighting operational. Non-slip footwear when patient is off stretcher. Physically safe environment: no spills, clutter or unnecessary equipment. Stretcher in lowest position, wheels locked, appropriate side rails in place. Provide visual cue, wrist band, yellow gown, etc. Monitor gait and stability. Monitor for mental status changes and reorient to person, place, and time. Review medications for side effects contributing to fall risk. Reinforce activity limits and safety measures with patient and family. Provide visual clues: red socks.

## 2019-02-04 ENCOUNTER — EMERGENCY (EMERGENCY)
Facility: HOSPITAL | Age: 84
LOS: 1 days | Discharge: HOME | End: 2019-02-04
Attending: EMERGENCY MEDICINE | Admitting: EMERGENCY MEDICINE
Payer: MEDICARE

## 2019-02-04 VITALS
OXYGEN SATURATION: 99 % | HEART RATE: 84 BPM | DIASTOLIC BLOOD PRESSURE: 79 MMHG | RESPIRATION RATE: 18 BRPM | SYSTOLIC BLOOD PRESSURE: 178 MMHG | WEIGHT: 139.99 LBS | TEMPERATURE: 98 F | HEIGHT: 62 IN

## 2019-02-04 VITALS
SYSTOLIC BLOOD PRESSURE: 151 MMHG | HEART RATE: 81 BPM | DIASTOLIC BLOOD PRESSURE: 68 MMHG | OXYGEN SATURATION: 99 % | RESPIRATION RATE: 19 BRPM

## 2019-02-04 DIAGNOSIS — R06.02 SHORTNESS OF BREATH: ICD-10-CM

## 2019-02-04 PROBLEM — H40.9 UNSPECIFIED GLAUCOMA: Chronic | Status: ACTIVE | Noted: 2019-02-03

## 2019-02-04 PROBLEM — Z86.69 PERSONAL HISTORY OF OTHER DISEASES OF THE NERVOUS SYSTEM AND SENSE ORGANS: Chronic | Status: ACTIVE | Noted: 2019-02-03

## 2019-02-04 LAB
ALBUMIN SERPL ELPH-MCNC: 3.1 G/DL — LOW (ref 3.3–5)
ALP SERPL-CCNC: 57 U/L — SIGNIFICANT CHANGE UP (ref 40–120)
ALT FLD-CCNC: 84 U/L DA — HIGH (ref 10–45)
ANION GAP SERPL CALC-SCNC: 9 MMOL/L — SIGNIFICANT CHANGE UP (ref 5–17)
AST SERPL-CCNC: 72 U/L — HIGH (ref 10–40)
BILIRUB SERPL-MCNC: 0.3 MG/DL — SIGNIFICANT CHANGE UP (ref 0.2–1.2)
BUN SERPL-MCNC: 15 MG/DL — SIGNIFICANT CHANGE UP (ref 7–23)
CALCIUM SERPL-MCNC: 8.8 MG/DL — SIGNIFICANT CHANGE UP (ref 8.4–10.5)
CHLORIDE SERPL-SCNC: 104 MMOL/L — SIGNIFICANT CHANGE UP (ref 96–108)
CO2 SERPL-SCNC: 25 MMOL/L — SIGNIFICANT CHANGE UP (ref 22–31)
CREAT SERPL-MCNC: 1.02 MG/DL — SIGNIFICANT CHANGE UP (ref 0.5–1.3)
FLU A RESULT: DETECTED
FLU A RESULT: DETECTED
FLUAV AG NPH QL: DETECTED
FLUBV AG NPH QL: SIGNIFICANT CHANGE UP
GLUCOSE SERPL-MCNC: 121 MG/DL — HIGH (ref 70–99)
HCT VFR BLD CALC: 32.3 % — LOW (ref 34.5–45)
HGB BLD-MCNC: 11.3 G/DL — LOW (ref 11.5–15.5)
MCHC RBC-ENTMCNC: 30.8 PG — SIGNIFICANT CHANGE UP (ref 27–34)
MCHC RBC-ENTMCNC: 35 GM/DL — SIGNIFICANT CHANGE UP (ref 32–36)
MCV RBC AUTO: 88.1 FL — SIGNIFICANT CHANGE UP (ref 80–100)
NT-PROBNP SERPL-SCNC: 1566 PG/ML — HIGH (ref 0–300)
PLATELET # BLD AUTO: 220 K/UL — SIGNIFICANT CHANGE UP (ref 150–400)
POTASSIUM SERPL-MCNC: 3.4 MMOL/L — LOW (ref 3.5–5.3)
POTASSIUM SERPL-SCNC: 3.4 MMOL/L — LOW (ref 3.5–5.3)
PROT SERPL-MCNC: 6.4 G/DL — SIGNIFICANT CHANGE UP (ref 6–8.3)
RBC # BLD: 3.66 M/UL — LOW (ref 3.8–5.2)
RBC # FLD: 12 % — SIGNIFICANT CHANGE UP (ref 10.3–14.5)
RSV RESULT: SIGNIFICANT CHANGE UP
RSV RNA RESP QL NAA+PROBE: SIGNIFICANT CHANGE UP
SODIUM SERPL-SCNC: 138 MMOL/L — SIGNIFICANT CHANGE UP (ref 135–145)
TROPONIN I SERPL-MCNC: 0.04 NG/ML — SIGNIFICANT CHANGE UP (ref 0.02–0.06)
WBC # BLD: 9.4 K/UL — SIGNIFICANT CHANGE UP (ref 3.8–10.5)
WBC # FLD AUTO: 9.4 K/UL — SIGNIFICANT CHANGE UP (ref 3.8–10.5)

## 2019-02-04 PROCEDURE — 99285 EMERGENCY DEPT VISIT HI MDM: CPT

## 2019-02-04 PROCEDURE — 71045 X-RAY EXAM CHEST 1 VIEW: CPT | Mod: 26

## 2019-02-04 PROCEDURE — 93010 ELECTROCARDIOGRAM REPORT: CPT

## 2019-02-04 RX ORDER — FLUTICASONE PROPIONATE 50 MCG
2 SPRAY, SUSPENSION NASAL ONCE
Qty: 0 | Refills: 0 | Status: COMPLETED | OUTPATIENT
Start: 2019-02-04 | End: 2019-02-04

## 2019-02-04 RX ORDER — FLUTICASONE PROPIONATE 50 MCG
1 SPRAY, SUSPENSION NASAL
Qty: 1 | Refills: 0 | OUTPATIENT
Start: 2019-02-04 | End: 2019-03-05

## 2019-02-04 RX ORDER — ALBUTEROL 90 UG/1
2.5 AEROSOL, METERED ORAL ONCE
Qty: 0 | Refills: 0 | Status: COMPLETED | OUTPATIENT
Start: 2019-02-04 | End: 2019-02-04

## 2019-02-04 RX ORDER — DIAZEPAM 5 MG
2 TABLET ORAL ONCE
Qty: 0 | Refills: 0 | Status: DISCONTINUED | OUTPATIENT
Start: 2019-02-04 | End: 2019-02-04

## 2019-02-04 RX ADMIN — Medication 60 MILLIGRAM(S): at 08:19

## 2019-02-04 RX ADMIN — Medication 0.5 MILLIGRAM(S): at 08:19

## 2019-02-04 RX ADMIN — Medication 2 SPRAY(S): at 10:11

## 2019-02-04 RX ADMIN — Medication 2 MILLIGRAM(S): at 09:18

## 2019-02-04 RX ADMIN — ALBUTEROL 2.5 MILLIGRAM(S): 90 AEROSOL, METERED ORAL at 08:23

## 2019-02-04 NOTE — ED ADULT NURSE NOTE - NSIMPLEMENTINTERV_GEN_ALL_ED
Implemented All Universal Safety Interventions:  [place] to call system. Call bell, personal items and telephone within reach. Instruct patient to call for assistance. Room bathroom lighting operational. Non-slip footwear when patient is off stretcher. Physically safe environment: no spills, clutter or unnecessary equipment. Stretcher in lowest position, wheels locked, appropriate side rails in place.

## 2019-02-04 NOTE — ED ADULT NURSE REASSESSMENT NOTE - NS ED NURSE REASSESS COMMENT FT1
Patient states she feels much better, Patient removed her own heplock, patient discharged with her son in law

## 2019-02-04 NOTE — ED PROVIDER NOTE - OBJECTIVE STATEMENT
pt c/o difficult breathing, severe, for last 2 days. assoc c nasal congestion, chest congestion, mild cough. no fever. no chest pain. no leg pain/swelling /recent periods of immobility. pt was seen for this yesterday here. had CTA chest, negative, labs. dc'ed c doxycycline, antitussive, albuterol mdi. pt states she is not better. pt suffers from anxiety. takes ativan 0.5mg prn

## 2019-02-04 NOTE — ED PROVIDER NOTE - MEDICAL DECISION MAKING DETAILS
sob with nasal congestion cough. anxiety. likely URI c anxiety.  will repeat labs, trop, cxr, ekg. r/o pna, acs.   give albuterol, ativan. reassess. cta chest yesterday unremarkable

## 2019-02-04 NOTE — ED ADULT NURSE REASSESSMENT NOTE - NS ED NURSE REASSESS COMMENT FT1
Patient states that she cant breath, pulse oximetry 100% on room air, and respirations 18, albuterol neb treatment was administered

## 2019-02-04 NOTE — ED PROVIDER NOTE - CARE PLAN
Principal Discharge DX:	Bronchitis  Secondary Diagnosis:	Anxiety Principal Discharge DX:	Bronchitis  Secondary Diagnosis:	Anxiety  Secondary Diagnosis:	Influenza A

## 2019-02-04 NOTE — ED PROVIDER NOTE - ENMT, MLM
Airway patent, congested nasal turbinates.  Mouth with normal mucosa. Throat has no vesicles, no oropharyngeal exudates and uvula is midline.

## 2019-02-04 NOTE — ED PROVIDER NOTE - CONSTITUTIONAL, MLM
normal... Well appearing, well nourished, awake, alert, oriented to person, place, time/situation and in no apparent distress.  anxious

## 2019-02-04 NOTE — ED ADULT NURSE REASSESSMENT NOTE - NS ED NURSE REASSESS COMMENT FT1
came in ambulatory aox3, c/o shortness of breath , o2 sat 99% ,reassurance given, family  @ bedside.

## 2019-02-05 ENCOUNTER — INPATIENT (INPATIENT)
Facility: HOSPITAL | Age: 84
LOS: 1 days | Discharge: HOME | DRG: 195 | End: 2019-02-07
Attending: HOSPITALIST | Admitting: HOSPITALIST
Payer: MEDICARE

## 2019-02-05 VITALS
TEMPERATURE: 98 F | RESPIRATION RATE: 20 BRPM | OXYGEN SATURATION: 97 % | HEIGHT: 62 IN | SYSTOLIC BLOOD PRESSURE: 178 MMHG | DIASTOLIC BLOOD PRESSURE: 75 MMHG | WEIGHT: 139.99 LBS | HEART RATE: 86 BPM

## 2019-02-05 DIAGNOSIS — J11.1 INFLUENZA DUE TO UNIDENTIFIED INFLUENZA VIRUS WITH OTHER RESPIRATORY MANIFESTATIONS: ICD-10-CM

## 2019-02-05 DIAGNOSIS — R78.81 BACTEREMIA: ICD-10-CM

## 2019-02-05 DIAGNOSIS — Z71.89 OTHER SPECIFIED COUNSELING: ICD-10-CM

## 2019-02-05 DIAGNOSIS — R07.9 CHEST PAIN, UNSPECIFIED: ICD-10-CM

## 2019-02-05 DIAGNOSIS — Z29.9 ENCOUNTER FOR PROPHYLACTIC MEASURES, UNSPECIFIED: ICD-10-CM

## 2019-02-05 DIAGNOSIS — E87.6 HYPOKALEMIA: ICD-10-CM

## 2019-02-05 DIAGNOSIS — I10 ESSENTIAL (PRIMARY) HYPERTENSION: ICD-10-CM

## 2019-02-05 DIAGNOSIS — E03.9 HYPOTHYROIDISM, UNSPECIFIED: ICD-10-CM

## 2019-02-05 DIAGNOSIS — F41.9 ANXIETY DISORDER, UNSPECIFIED: ICD-10-CM

## 2019-02-05 DIAGNOSIS — R07.89 OTHER CHEST PAIN: ICD-10-CM

## 2019-02-05 LAB
-  COAGULASE NEGATIVE STAPHYLOCOCCUS: SIGNIFICANT CHANGE UP
ALBUMIN SERPL ELPH-MCNC: 3.6 G/DL — SIGNIFICANT CHANGE UP (ref 3.3–5)
ALP SERPL-CCNC: 64 U/L — SIGNIFICANT CHANGE UP (ref 40–120)
ALT FLD-CCNC: 70 U/L DA — HIGH (ref 10–45)
ANION GAP SERPL CALC-SCNC: 11 MMOL/L — SIGNIFICANT CHANGE UP (ref 5–17)
APPEARANCE UR: CLEAR — SIGNIFICANT CHANGE UP
APTT BLD: 26.6 SEC — LOW (ref 27.5–36.3)
AST SERPL-CCNC: 43 U/L — HIGH (ref 10–40)
BACTERIA # UR AUTO: NEGATIVE /HPF — SIGNIFICANT CHANGE UP
BASOPHILS # BLD AUTO: 0.1 K/UL — SIGNIFICANT CHANGE UP (ref 0–0.2)
BASOPHILS NFR BLD AUTO: 0.7 % — SIGNIFICANT CHANGE UP (ref 0–2)
BILIRUB SERPL-MCNC: 0.3 MG/DL — SIGNIFICANT CHANGE UP (ref 0.2–1.2)
BILIRUB UR-MCNC: NEGATIVE — SIGNIFICANT CHANGE UP
BUN SERPL-MCNC: 19 MG/DL — SIGNIFICANT CHANGE UP (ref 7–23)
CALCIUM SERPL-MCNC: 9.2 MG/DL — SIGNIFICANT CHANGE UP (ref 8.4–10.5)
CHLORIDE SERPL-SCNC: 101 MMOL/L — SIGNIFICANT CHANGE UP (ref 96–108)
CO2 SERPL-SCNC: 26 MMOL/L — SIGNIFICANT CHANGE UP (ref 22–31)
COLOR SPEC: YELLOW — SIGNIFICANT CHANGE UP
CREAT SERPL-MCNC: 1.13 MG/DL — SIGNIFICANT CHANGE UP (ref 0.5–1.3)
DIFF PNL FLD: NEGATIVE — SIGNIFICANT CHANGE UP
EOSINOPHIL # BLD AUTO: 0 K/UL — SIGNIFICANT CHANGE UP (ref 0–0.5)
EOSINOPHIL NFR BLD AUTO: 0.3 % — SIGNIFICANT CHANGE UP (ref 0–6)
EPI CELLS # UR: SIGNIFICANT CHANGE UP
GLUCOSE SERPL-MCNC: 100 MG/DL — HIGH (ref 70–99)
GLUCOSE UR QL: NEGATIVE — SIGNIFICANT CHANGE UP
GRAM STN FLD: SIGNIFICANT CHANGE UP
HCT VFR BLD CALC: 37.6 % — SIGNIFICANT CHANGE UP (ref 34.5–45)
HGB BLD-MCNC: 12.8 G/DL — SIGNIFICANT CHANGE UP (ref 11.5–15.5)
INR BLD: 0.93 RATIO — SIGNIFICANT CHANGE UP (ref 0.88–1.16)
KETONES UR-MCNC: NEGATIVE — SIGNIFICANT CHANGE UP
LEUKOCYTE ESTERASE UR-ACNC: NEGATIVE — SIGNIFICANT CHANGE UP
LYMPHOCYTES # BLD AUTO: 2.5 K/UL — SIGNIFICANT CHANGE UP (ref 1–3.3)
LYMPHOCYTES # BLD AUTO: 21.5 % — SIGNIFICANT CHANGE UP (ref 13–44)
MAGNESIUM SERPL-MCNC: 1.6 MG/DL — SIGNIFICANT CHANGE UP (ref 1.6–2.6)
MCHC RBC-ENTMCNC: 30.2 PG — SIGNIFICANT CHANGE UP (ref 27–34)
MCHC RBC-ENTMCNC: 34 GM/DL — SIGNIFICANT CHANGE UP (ref 32–36)
MCV RBC AUTO: 88.7 FL — SIGNIFICANT CHANGE UP (ref 80–100)
METHOD TYPE: SIGNIFICANT CHANGE UP
MONOCYTES # BLD AUTO: 0.8 K/UL — SIGNIFICANT CHANGE UP (ref 0–0.9)
MONOCYTES NFR BLD AUTO: 7.1 % — SIGNIFICANT CHANGE UP (ref 1–9)
NEUTROPHILS # BLD AUTO: 8.1 K/UL — HIGH (ref 1.8–7.4)
NEUTROPHILS NFR BLD AUTO: 70.4 % — SIGNIFICANT CHANGE UP (ref 43–77)
NITRITE UR-MCNC: NEGATIVE — SIGNIFICANT CHANGE UP
PH UR: 7 — SIGNIFICANT CHANGE UP (ref 5–8)
PLATELET # BLD AUTO: 278 K/UL — SIGNIFICANT CHANGE UP (ref 150–400)
POTASSIUM SERPL-MCNC: 3.1 MMOL/L — LOW (ref 3.5–5.3)
POTASSIUM SERPL-SCNC: 3.1 MMOL/L — LOW (ref 3.5–5.3)
PROCALCITONIN SERPL-MCNC: 0.22 NG/ML — HIGH
PROT SERPL-MCNC: 7.2 G/DL — SIGNIFICANT CHANGE UP (ref 6–8.3)
PROT UR-MCNC: 15
PROTHROM AB SERPL-ACNC: 10.4 SEC — SIGNIFICANT CHANGE UP (ref 10–12.9)
RBC # BLD: 4.24 M/UL — SIGNIFICANT CHANGE UP (ref 3.8–5.2)
RBC # FLD: 12.4 % — SIGNIFICANT CHANGE UP (ref 10.3–14.5)
RBC CASTS # UR COMP ASSIST: NEGATIVE /HPF — SIGNIFICANT CHANGE UP (ref 0–4)
SODIUM SERPL-SCNC: 138 MMOL/L — SIGNIFICANT CHANGE UP (ref 135–145)
SP GR SPEC: 1.01 — SIGNIFICANT CHANGE UP (ref 1.01–1.02)
TROPONIN I SERPL-MCNC: 0.06 NG/ML — HIGH (ref 0.02–0.06)
TROPONIN I SERPL-MCNC: 0.06 NG/ML — SIGNIFICANT CHANGE UP (ref 0.02–0.06)
UROBILINOGEN FLD QL: NEGATIVE — SIGNIFICANT CHANGE UP
WBC # BLD: 11.6 K/UL — HIGH (ref 3.8–10.5)
WBC # FLD AUTO: 11.6 K/UL — HIGH (ref 3.8–10.5)
WBC UR QL: NEGATIVE /HPF — SIGNIFICANT CHANGE UP (ref 0–5)

## 2019-02-05 PROCEDURE — 93010 ELECTROCARDIOGRAM REPORT: CPT

## 2019-02-05 PROCEDURE — 99285 EMERGENCY DEPT VISIT HI MDM: CPT

## 2019-02-05 PROCEDURE — 99223 1ST HOSP IP/OBS HIGH 75: CPT | Mod: AI

## 2019-02-05 RX ORDER — AMLODIPINE BESYLATE 2.5 MG/1
10 TABLET ORAL DAILY
Qty: 0 | Refills: 0 | Status: DISCONTINUED | OUTPATIENT
Start: 2019-02-05 | End: 2019-02-07

## 2019-02-05 RX ORDER — VANCOMYCIN HCL 1 G
1000 VIAL (EA) INTRAVENOUS ONCE
Qty: 0 | Refills: 0 | Status: COMPLETED | OUTPATIENT
Start: 2019-02-05 | End: 2019-02-06

## 2019-02-05 RX ORDER — ATORVASTATIN CALCIUM 80 MG/1
20 TABLET, FILM COATED ORAL AT BEDTIME
Qty: 0 | Refills: 0 | Status: DISCONTINUED | OUTPATIENT
Start: 2019-02-05 | End: 2019-02-07

## 2019-02-05 RX ORDER — POTASSIUM CHLORIDE 20 MEQ
40 PACKET (EA) ORAL ONCE
Qty: 0 | Refills: 0 | Status: COMPLETED | OUTPATIENT
Start: 2019-02-05 | End: 2019-02-06

## 2019-02-05 RX ORDER — IPRATROPIUM/ALBUTEROL SULFATE 18-103MCG
3 AEROSOL WITH ADAPTER (GRAM) INHALATION
Qty: 0 | Refills: 0 | Status: COMPLETED | OUTPATIENT
Start: 2019-02-05 | End: 2019-02-05

## 2019-02-05 RX ORDER — ONDANSETRON 8 MG/1
4 TABLET, FILM COATED ORAL EVERY 6 HOURS
Qty: 0 | Refills: 0 | Status: DISCONTINUED | OUTPATIENT
Start: 2019-02-05 | End: 2019-02-07

## 2019-02-05 RX ORDER — ASPIRIN/CALCIUM CARB/MAGNESIUM 324 MG
325 TABLET ORAL ONCE
Qty: 0 | Refills: 0 | Status: COMPLETED | OUTPATIENT
Start: 2019-02-05 | End: 2019-02-05

## 2019-02-05 RX ORDER — LEVOTHYROXINE SODIUM 125 MCG
100 TABLET ORAL DAILY
Qty: 0 | Refills: 0 | Status: DISCONTINUED | OUTPATIENT
Start: 2019-02-05 | End: 2019-02-07

## 2019-02-05 RX ORDER — IPRATROPIUM/ALBUTEROL SULFATE 18-103MCG
3 AEROSOL WITH ADAPTER (GRAM) INHALATION EVERY 8 HOURS
Qty: 0 | Refills: 0 | Status: DISCONTINUED | OUTPATIENT
Start: 2019-02-05 | End: 2019-02-07

## 2019-02-05 RX ORDER — LANOLIN ALCOHOL/MO/W.PET/CERES
3 CREAM (GRAM) TOPICAL AT BEDTIME
Qty: 0 | Refills: 0 | Status: DISCONTINUED | OUTPATIENT
Start: 2019-02-05 | End: 2019-02-07

## 2019-02-05 RX ORDER — PANTOPRAZOLE SODIUM 20 MG/1
40 TABLET, DELAYED RELEASE ORAL ONCE
Qty: 0 | Refills: 0 | Status: COMPLETED | OUTPATIENT
Start: 2019-02-05 | End: 2019-02-06

## 2019-02-05 RX ORDER — ACETAMINOPHEN 500 MG
650 TABLET ORAL EVERY 6 HOURS
Qty: 0 | Refills: 0 | Status: DISCONTINUED | OUTPATIENT
Start: 2019-02-05 | End: 2019-02-07

## 2019-02-05 RX ORDER — LACTOBACILLUS ACIDOPHILUS 100MM CELL
1 CAPSULE ORAL
Qty: 0 | Refills: 0 | Status: DISCONTINUED | OUTPATIENT
Start: 2019-02-05 | End: 2019-02-07

## 2019-02-05 RX ADMIN — Medication 3 MILLILITER(S): at 18:03

## 2019-02-05 RX ADMIN — Medication 3 MILLILITER(S): at 18:13

## 2019-02-05 RX ADMIN — Medication 3 MILLIGRAM(S): at 22:37

## 2019-02-05 RX ADMIN — Medication 325 MILLIGRAM(S): at 22:36

## 2019-02-05 RX ADMIN — Medication 3 MILLILITER(S): at 23:54

## 2019-02-05 RX ADMIN — Medication 3 MILLILITER(S): at 18:24

## 2019-02-05 RX ADMIN — ATORVASTATIN CALCIUM 20 MILLIGRAM(S): 80 TABLET, FILM COATED ORAL at 22:37

## 2019-02-05 RX ADMIN — Medication 0.5 MILLIGRAM(S): at 19:08

## 2019-02-05 NOTE — ED POST DISCHARGE NOTE - DETAILS
Message left by Dr. Stout for urgent call back. Discussed with pt's daughter Karla and her son-in-law, will bring back pt to the ED. Pt continues to feel unwell, however recently dx'ed with flu as well

## 2019-02-05 NOTE — H&P ADULT - HISTORY OF PRESENT ILLNESS
Called back to ED for positive blood cultures, recent URI and influenza just diagnosed yesterday. 84 yo F PMH HTN CARA/insomnia, HLD, remote smoking history, is called back to ED for positive blood cultures, recent URI and influenza just diagnosed yesterday. SHe has c/o upper respiratory symptoms and chest congestion X 3 days. She came to ED on 2/3 and was discharged with improved symptoms. She returned to ED on 2/4 with worsening symptoms and was diagnosed with influenza; sent home on tamiflu and medrol. Today she reports improved symptoms but was called back due to gram positive cocci on one culture.  does endorse subjective fevers and cough, prod of white sputum. She c/o chest tightness and dyspnea, but denies jaw arm or leg claudication. She denies syncope or presyncope. She has many sick contacts, her daughter and granddaughter have URIs.

## 2019-02-05 NOTE — ED ADULT NURSE NOTE - OBJECTIVE STATEMENT
Pt BIB by daughter from home because she was called back today for positive blood cultures from when she was here in the ED on 2/3. Pt daughter states she was here twice since the 3rd for diagnosis of bronchitis and positive flu. Pt complaining of SOB and cough but no pain. pt given 3LNC for comfort. Pt denies any SOB, chest pain. headache, n/v/d, or any other complaints at this time. pt placed on cardiac monitor.

## 2019-02-05 NOTE — H&P ADULT - NSHPREVIEWOFSYSTEMS_GEN_ALL_CORE
Constitutional: HPI  HEENT: denies dry mouth, sore throat, runny nose, photophobia, blurry vision, double vision, discharge, eye pain, difficulty hearing, vertigo, dysphagia, epistaxis, recent dental work    Respiratory:HPI  Cardiovascular: denies CP, palpitations, edema  Gastrointestinal: denies nausea, vomiting, diarrhea, constipation, abdominal pain, melena, hematochezia   Genitourinary: denies dysuria, frequency, urgency, incontinence, hematuria   Skin/Breast: denies rash, hives, itching, masses, hair loss   Musculoskeletal: denies myalgias, arthritis, joint swelling, muscle weakness  Neurologic: denies syncope, LOC, headache, weakness, dizziness, paresthesias, numbness, seizures, confusion, dementia   Psychiatric: denies feeling anxious, depressed, suicidal, homicidal thoughts  Endocrine: denies , cold or heat intolerance, polydipsia, polyuria, polyphagia   Hematology/Oncology: denies bruising, tender or enlarged lymph nodes   ROS negative except as noted above

## 2019-02-05 NOTE — H&P ADULT - ASSESSMENT
82 yo F remote hx smoking with recently diagnosed influenza A admitted for influenza, +blood Cx, and hypokalemia. She also endorses week-long chest tightness

## 2019-02-05 NOTE — H&P ADULT - PROBLEM SELECTOR PLAN 3
start vancomycin pending Cultures start vancomycin pending Cultures  -with decreased renal clearance; one time dose. Pending sensitivities, may check trough and re-dose  contemplate ID consult

## 2019-02-05 NOTE — H&P ADULT - PROBLEM SELECTOR PLAN 4
mild troponin elevation  -ECG pending. Prior ECG with AF?  -exam consistent with pleuritis.  -continue cardiac monitoring. Trend trop. Cardiac eval Dr. Olmedo

## 2019-02-05 NOTE — H&P ADULT - NSHPSOCIALHISTORY_GEN_ALL_CORE
Former smoker, over 20 PPD years. quit 50 years ago  social alcohol, no drugs   Lives with adult daughter

## 2019-02-05 NOTE — H&P ADULT - NSHPLABSRESULTS_GEN_ALL_CORE
12.8   11.6  )-----------( 278      ( 2019 14:30 )             37.6       -    138  |  101  |  19  ----------------------------<  100<H>  3.1<L>   |  26  |  1.13    Ca    9.2      2019 14:30  Mg     1.6         TPro  7.2  /  Alb  3.6  /  TBili  0.3  /  DBili  x   /  AST  43<H>  /  ALT  70<H>  /  AlkPhos  64                Urinalysis Basic - ( 2019 15:50 )    Color: Yellow / Appearance: Clear / S.010 / pH: x  Gluc: x / Ketone: Negative  / Bili: Negative / Urobili: Negative   Blood: x / Protein: 15 / Nitrite: Negative   Leuk Esterase: Negative / RBC: Negative /HPF / WBC Negative /HPF   Sq Epi: x / Non Sq Epi: Neg.-Few / Bacteria: Negative /HPF        PT/INR - ( 2019 14:30 )   PT: 10.4 sec;   INR: 0.93 ratio         PTT - ( 2019 14:30 )  PTT:26.6 sec    Lactate Trend   @ 06:38 Lactate:1.3    @ 04:05 Lactate:2.2       CARDIAC MARKERS ( 2019 20:15 )  .057 ng/mL / x     / x     / x     / x      CARDIAC MARKERS ( 2019 14:30 )  .056 ng/mL / x     / x     / x     / x      CARDIAC MARKERS ( 2019 07:10 )  .045 ng/mL / x     / x     / x     / x            CAPILLARY BLOOD GLUCOSE            Culture Results:   No growth to date. ( @ 04:05)  Culture Results:   Growth in aerobic bottle: Gram Positive Cocci in Clusters  "Due to technical problems, Proteus sp. will Not be reported as part of  the BCID panel until further notice"  ***Blood Panel PCR results on this specimen are available  approximately 3 hours after the Gram stain result.***  Gram stain, PCR, and/or culture results may not always  correspond due to difference in methodologies.  ************************************************************  This PCR assay was performed using [employer].  The following targets are tested for: Enterococcus,  vancomycin resistant enterococci, Listeria monocytogenes,  coagulase negative staphylococci, S. aureus,  methicillin resistant S. aureus, Streptococcus agalactiae  (Group B), S. pneumoniae, S.pyogenes (Group A),  Acinetobacter baumannii, Enterobacter cloacae, E. coli,  Klebsiella oxytoca, K. pneumoniae, Proteus sp.,  Serratia marcescens, Haemophilus influenzae,  Neisseria meningitidis, Pseudomonas aeruginosa, Candida  albicans, C. glabrata, C krusei, C parapsilosis,  C. tropicalis and the KPC resistance gene. ( @ 04:05)

## 2019-02-05 NOTE — ED PROVIDER NOTE - OBJECTIVE STATEMENT
83 year old F with known influenza was called back to the ed for a positive blood culture of gram positive cocci. Pt denies fever or chills. Pt has complaints of chest congestion and chest pain. This is the third ed visit in the last week.

## 2019-02-05 NOTE — ED PROVIDER NOTE - MEDICAL DECISION MAKING DETAILS
83 year old with known influenza called back for positive blood culture of gram positive cocci. I do not think pt is bacteremic culture will be repaeated chest pain probably do to cough mild elevated troponin will be trended Case discussed with Dr. Reyes

## 2019-02-05 NOTE — H&P ADULT - NSHPPHYSICALEXAM_GEN_ALL_CORE
Vital Signs Last 24 Hrs  T(C): 36.7 (05 Feb 2019 14:11), Max: 36.7 (05 Feb 2019 14:11)  T(F): 98.1 (05 Feb 2019 14:11), Max: 98.1 (05 Feb 2019 14:11)  HR: 86 (05 Feb 2019 16:18) (86 - 86)  BP: 172/100 (05 Feb 2019 16:18) (172/100 - 178/75)  BP(mean): --  RR: 22 (05 Feb 2019 16:18) (20 - 22)  SpO2: 100% (05 Feb 2019 16:18) (97% - 100%)    General: Well developed, well nourished, NAD  HEENT: NCAT, PERRLA, EOMI bl, moist mucous membranes   Neck: Supple, nontender, no mass  Neurology: A&Ox3,CN II-XII grossly intact, sensation intact  Respiratory:diffuse wheezing   CV: RRR, +S1/S2, no murmurs, rubs or gallops  Abdominal: Soft, NT, ND +BSx4  Extremities: No C/C/E, + peripheral pulses  MSK: Normal ROM, no joint erythema or warmth, no joint swelling   Skin: warm, dry, normal color, no rash or abnormal lesions

## 2019-02-06 LAB
ANION GAP SERPL CALC-SCNC: 13 MMOL/L — SIGNIFICANT CHANGE UP (ref 5–17)
BASOPHILS # BLD AUTO: 0 K/UL — SIGNIFICANT CHANGE UP (ref 0–0.2)
BASOPHILS NFR BLD AUTO: 0.6 % — SIGNIFICANT CHANGE UP (ref 0–2)
BUN SERPL-MCNC: 17 MG/DL — SIGNIFICANT CHANGE UP (ref 7–23)
CALCIUM SERPL-MCNC: 9.1 MG/DL — SIGNIFICANT CHANGE UP (ref 8.4–10.5)
CHLORIDE SERPL-SCNC: 99 MMOL/L — SIGNIFICANT CHANGE UP (ref 96–108)
CO2 SERPL-SCNC: 25 MMOL/L — SIGNIFICANT CHANGE UP (ref 22–31)
CREAT SERPL-MCNC: 1.38 MG/DL — HIGH (ref 0.5–1.3)
CULTURE RESULTS: SIGNIFICANT CHANGE UP
EOSINOPHIL # BLD AUTO: 0 K/UL — SIGNIFICANT CHANGE UP (ref 0–0.5)
EOSINOPHIL NFR BLD AUTO: 0 % — SIGNIFICANT CHANGE UP (ref 0–6)
GLUCOSE SERPL-MCNC: 239 MG/DL — HIGH (ref 70–99)
HBA1C BLD-MCNC: 5.6 % — SIGNIFICANT CHANGE UP (ref 4–5.6)
HCT VFR BLD CALC: 39.2 % — SIGNIFICANT CHANGE UP (ref 34.5–45)
HGB BLD-MCNC: 13.2 G/DL — SIGNIFICANT CHANGE UP (ref 11.5–15.5)
LYMPHOCYTES # BLD AUTO: 0.7 K/UL — LOW (ref 1–3.3)
LYMPHOCYTES # BLD AUTO: 8.3 % — LOW (ref 13–44)
MCHC RBC-ENTMCNC: 30.2 PG — SIGNIFICANT CHANGE UP (ref 27–34)
MCHC RBC-ENTMCNC: 33.7 GM/DL — SIGNIFICANT CHANGE UP (ref 32–36)
MCV RBC AUTO: 89.6 FL — SIGNIFICANT CHANGE UP (ref 80–100)
MONOCYTES # BLD AUTO: 0.1 K/UL — SIGNIFICANT CHANGE UP (ref 0–0.9)
MONOCYTES NFR BLD AUTO: 1.6 % — LOW (ref 2–14)
NEUTROPHILS # BLD AUTO: 7.2 K/UL — SIGNIFICANT CHANGE UP (ref 1.8–7.4)
NEUTROPHILS NFR BLD AUTO: 89.5 % — HIGH (ref 43–77)
ORGANISM # SPEC MICROSCOPIC CNT: SIGNIFICANT CHANGE UP
ORGANISM # SPEC MICROSCOPIC CNT: SIGNIFICANT CHANGE UP
PLATELET # BLD AUTO: 267 K/UL — SIGNIFICANT CHANGE UP (ref 150–400)
POTASSIUM SERPL-MCNC: 3.7 MMOL/L — SIGNIFICANT CHANGE UP (ref 3.5–5.3)
POTASSIUM SERPL-SCNC: 3.7 MMOL/L — SIGNIFICANT CHANGE UP (ref 3.5–5.3)
RBC # BLD: 4.38 M/UL — SIGNIFICANT CHANGE UP (ref 3.8–5.2)
RBC # FLD: 12.4 % — SIGNIFICANT CHANGE UP (ref 10.3–14.5)
SODIUM SERPL-SCNC: 137 MMOL/L — SIGNIFICANT CHANGE UP (ref 135–145)
SPECIMEN SOURCE: SIGNIFICANT CHANGE UP
TROPONIN I SERPL-MCNC: 0.04 NG/ML — SIGNIFICANT CHANGE UP (ref 0.02–0.06)
TROPONIN I SERPL-MCNC: 0.05 NG/ML — SIGNIFICANT CHANGE UP (ref 0.02–0.06)
WBC # BLD: 8.1 K/UL — SIGNIFICANT CHANGE UP (ref 3.8–10.5)
WBC # FLD AUTO: 8.1 K/UL — SIGNIFICANT CHANGE UP (ref 3.8–10.5)

## 2019-02-06 PROCEDURE — 99232 SBSQ HOSP IP/OBS MODERATE 35: CPT

## 2019-02-06 PROCEDURE — 99223 1ST HOSP IP/OBS HIGH 75: CPT

## 2019-02-06 RX ORDER — SODIUM CHLORIDE 9 MG/ML
1000 INJECTION, SOLUTION INTRAVENOUS
Qty: 0 | Refills: 0 | Status: DISCONTINUED | OUTPATIENT
Start: 2019-02-06 | End: 2019-02-07

## 2019-02-06 RX ADMIN — AMLODIPINE BESYLATE 10 MILLIGRAM(S): 2.5 TABLET ORAL at 05:08

## 2019-02-06 RX ADMIN — ONDANSETRON 4 MILLIGRAM(S): 8 TABLET, FILM COATED ORAL at 03:48

## 2019-02-06 RX ADMIN — Medication 0.5 MILLIGRAM(S): at 04:45

## 2019-02-06 RX ADMIN — Medication 30 MILLIGRAM(S): at 22:08

## 2019-02-06 RX ADMIN — Medication 3 MILLIGRAM(S): at 21:06

## 2019-02-06 RX ADMIN — PANTOPRAZOLE SODIUM 40 MILLIGRAM(S): 20 TABLET, DELAYED RELEASE ORAL at 00:39

## 2019-02-06 RX ADMIN — Medication 60 MILLIGRAM(S): at 00:39

## 2019-02-06 RX ADMIN — Medication 1 TABLET(S): at 13:36

## 2019-02-06 RX ADMIN — ATORVASTATIN CALCIUM 20 MILLIGRAM(S): 80 TABLET, FILM COATED ORAL at 21:06

## 2019-02-06 RX ADMIN — Medication 650 MILLIGRAM(S): at 21:56

## 2019-02-06 RX ADMIN — Medication 0.5 MILLIGRAM(S): at 10:11

## 2019-02-06 RX ADMIN — Medication 100 MICROGRAM(S): at 05:08

## 2019-02-06 RX ADMIN — Medication 100 MILLIGRAM(S): at 00:39

## 2019-02-06 RX ADMIN — Medication 30 MILLIGRAM(S): at 05:08

## 2019-02-06 RX ADMIN — Medication 40 MILLIEQUIVALENT(S): at 00:40

## 2019-02-06 RX ADMIN — Medication 1 TABLET(S): at 16:39

## 2019-02-06 RX ADMIN — Medication 1 TABLET(S): at 10:11

## 2019-02-06 RX ADMIN — Medication 650 MILLIGRAM(S): at 21:58

## 2019-02-06 RX ADMIN — Medication 30 MILLIGRAM(S): at 00:40

## 2019-02-06 RX ADMIN — Medication 3 MILLILITER(S): at 14:11

## 2019-02-06 RX ADMIN — Medication 3 MILLILITER(S): at 06:50

## 2019-02-06 RX ADMIN — Medication 3 MILLILITER(S): at 22:17

## 2019-02-06 NOTE — PROGRESS NOTE ADULT - PROBLEM SELECTOR PLAN 2
diffuse wheezing on admission, warranting IV solumedrol  Pulm Consulted- d/w Dr. Cadena   feels better, mild wheezing earlier

## 2019-02-06 NOTE — PROGRESS NOTE ADULT - SUBJECTIVE AND OBJECTIVE BOX
Patient is a 83y old  Female who presents with a chief complaint of +blood cultures (2019 20:35)      Patient seen and examined at bedside. feels okay, wants to go home to be with grand children     ALLERGIES:  No Known Allergies    MEDICATIONS:  acetaminophen   Tablet .. 650 milliGRAM(s) Oral every 6 hours PRN  ALBUTerol/ipratropium for Nebulization 3 milliLiter(s) Nebulizer every 8 hours  amLODIPine   Tablet 10 milliGRAM(s) Oral daily  atorvastatin 20 milliGRAM(s) Oral at bedtime  benzonatate 100 milliGRAM(s) Oral three times a day PRN  lactobacillus acidophilus 1 Tablet(s) Oral three times a day with meals  levothyroxine 100 MICROGram(s) Oral daily  LORazepam     Tablet 0.5 milliGRAM(s) Oral two times a day PRN  melatonin 3 milliGRAM(s) Oral at bedtime  ondansetron Injectable 4 milliGRAM(s) IV Push every 6 hours PRN  oseltamivir 30 milliGRAM(s) Oral two times a day    Vital Signs Last 24 Hrs  T(F): 98.5 (2019 03:46), Max: 98.5 (2019 03:46)  HR: 85 (2019 05:02) (72 - 86)  BP: 155/60 (2019 05:02) (148/65 - 178/75)  RR: 16 (2019 05:02) (15 - 22)  SpO2: 96% (2019 05:02) (94% - 100%)  I&O's Summary      PHYSICAL EXAM:  General: NAD, alert oriented   Neck: Supple, No JVD  Lungs: Clear to auscultation bilaterally  Cardio: RRR, S1/S2, No murmurs  Abdomen: Soft, Nontender, Nondistended; Bowel sounds present  Extremities: No clubbing, cyanosis, or edema    LABS:                        13.2   8.1   )-----------( 267      ( 2019 07:00 )             39.2     02-06    137  |  99  |  17  ----------------------------<  239  3.7   |  25  |  1.38    Ca    9.1      2019 07:00  Mg     1.6     02-05    TPro  7.2  /  Alb  3.6  /  TBili  0.3  /  DBili  x   /  AST  43  /  ALT  70  /  AlkPhos  64      eGFR if Non African American: 36 mL/min/1.73M2 (19 @ 07:00)  eGFR if : 41 mL/min/1.73M2 (19 @ 07:00)    PT/INR - ( 2019 14:30 )   PT: 10.4 sec;   INR: 0.93 ratio         PTT - ( 2019 14:30 )  PTT:26.6 sec    CARDIAC MARKERS ( 2019 05:00 )  .041 ng/mL / x     / x     / x     / x      CARDIAC MARKERS ( 2019 02:00 )  .048 ng/mL / x     / x     / x     / x      CARDIAC MARKERS ( 2019 20:15 )  .057 ng/mL / x     / x     / x     / x      CARDIAC MARKERS ( 2019 14:30 )  .056 ng/mL / x     / x     / x     / x      CARDIAC MARKERS ( 2019 07:10 )  .045 ng/mL / x     / x     / x     / x        CAPILLARY BLOOD GLUCOSE    Urinalysis Basic - ( 2019 15:50 )    Color: Yellow / Appearance: Clear / S.010 / pH: x  Gluc: x / Ketone: Negative  / Bili: Negative / Urobili: Negative   Blood: x / Protein: 15 / Nitrite: Negative   Leuk Esterase: Negative / RBC: Negative /HPF / WBC Negative /HPF   Sq Epi: x / Non Sq Epi: Neg.-Few / Bacteria: Negative /HPF        Culture - Blood (collected 2019 04:05)  Source: .Blood Blood-Peripheral  Gram Stain (2019 06:19):    Growth in aerobic bottle: Gram Positive Cocci in Clusters  Final Report (2019 11:01):    Growth in aerobic bottle: Coag Negative Staphylococcus    Single set isolate, possible contaminant. Contact    Microbiology if susceptibility testing clinically    indicated.    "Due to technical problems, Proteus sp. will Not be reported as part of    the BCID panel until further notice"    ***Blood Panel PCR results on this specimen are available    approximately 3 hours after the Gram stain result.***    Gram stain, PCR, and/or culture results may not always    correspond due to difference in methodologies.    ************************************************************    This PCR assay was performed using [employer].    The following targets are tested for: Enterococcus,    vancomycin resistant enterococci, Listeria monocytogenes,    coagulase negative staphylococci, S. aureus,    methicillin resistant S. aureus, Streptococcus agalactiae    (Group B), S. pneumoniae, S. pyogenes (Group A),    Acinetobacter baumannii, Enterobacter cloacae, E. coli,    Klebsiella oxytoca, K. pneumoniae, Proteus sp.,    Serratia marcescens, Haemophilus influenzae,    Neisseria meningitidis, Pseudomonas aeruginosa, Candida    albicans, C. glabrata, C krusei, C parapsilosis,    C. tropicalis and the KPC resistance gene.  Organism: Blood Culture PCR (2019 11:01)  Organism: Blood Culture PCR (2019 11:01)      -  Coagulase negative Staphylococcus: Detec      Method Type: PCR    Culture - Blood (collected 2019 04:05)  Source: .Blood Blood-Peripheral  Preliminary Report (2019 09:03):    No growth to date.        RADIOLOGY & ADDITIONAL TESTS:    Care Discussed with Consultants/Other Providers:

## 2019-02-06 NOTE — CONSULT NOTE ADULT - SUBJECTIVE AND OBJECTIVE BOX
HPI:   Patient is a 83y female with a past history of HTN,hypothyroidism,and macular degeneration who was evaluated in the ER on 2/3 with fever and upper airway congestion,diagnosed with influenza and placed on Tamiflu, who was called back to ER when the lab reported a positive blood culture.She was given vancomycin and is feeling improved today.Her fever and myalgias are improved.Her blood isolate has now been identified as a coag negative staph.    REVIEW OF SYSTEMS:  All other review of systems negative (Comprehensive ROS)    PAST MEDICAL & SURGICAL HISTORY:  Glaucoma  History of macular degeneration  Hypothyroidism  HTN (hypertension)  No significant past surgical history      Allergies    No Known Allergies    Intolerances        Antimicrobials Day #  :  oseltamivir 30 milliGRAM(s) Oral two times a day    Other Medications:  acetaminophen   Tablet .. 650 milliGRAM(s) Oral every 6 hours PRN  ALBUTerol/ipratropium for Nebulization 3 milliLiter(s) Nebulizer every 8 hours  amLODIPine   Tablet 10 milliGRAM(s) Oral daily  atorvastatin 20 milliGRAM(s) Oral at bedtime  benzonatate 100 milliGRAM(s) Oral three times a day PRN  lactobacillus acidophilus 1 Tablet(s) Oral three times a day with meals  levothyroxine 100 MICROGram(s) Oral daily  LORazepam     Tablet 0.5 milliGRAM(s) Oral two times a day PRN  melatonin 3 milliGRAM(s) Oral at bedtime  ondansetron Injectable 4 milliGRAM(s) IV Push every 6 hours PRN      FAMILY HISTORY:  No pertinent family history in first degree relatives      SOCIAL HISTORY:  Smoking:  no   ETOH: yes    Drug Use: no      T(F): 98.5 (19 @ 03:46), Max: 98.5 (19 @ 03:46)  HR: 85 (19 @ 05:02)  BP: 155/60 (19 @ 05:02)  RR: 16 (19 @ 05:02)  SpO2: 96% (19 @ 05:02)  Wt(kg): --    PHYSICAL EXAM:  General: alert, no acute distress  Eyes:  anicteric, no conjunctival injection, no discharge  Oropharynx: no lesions or injection 	  Neck: supple, without adenopathy  Lungs: clear to auscultation  Heart: regular rate and rhythm; no murmur, rubs or gallops  Abdomen: soft, nondistended, nontender, without mass or organomegaly  Skin: no lesions  Extremities: no clubbing, cyanosis, or edema  Neurologic: alert, oriented, moves all extremities    LAB RESULTS:                        13.2   8.1   )-----------( 267      ( 2019 07:00 )             39.2     02-06    137  |  99  |  17  ----------------------------<  239<H>  3.7   |  25  |  1.38<H>    Ca    9.1      2019 07:00  Mg     1.6     02-    TPro  7.2  /  Alb  3.6  /  TBili  0.3  /  DBili  x   /  AST  43<H>  /  ALT  70<H>  /  AlkPhos  64  02-    LIVER FUNCTIONS - ( 2019 14:30 )  Alb: 3.6 g/dL / Pro: 7.2 g/dL / ALK PHOS: 64 U/L / ALT: 70 U/L DA / AST: 43 U/L / GGT: x           Urinalysis Basic - ( 2019 15:50 )    Color: Yellow / Appearance: Clear / S.010 / pH: x  Gluc: x / Ketone: Negative  / Bili: Negative / Urobili: Negative   Blood: x / Protein: 15 / Nitrite: Negative   Leuk Esterase: Negative / RBC: Negative /HPF / WBC Negative /HPF   Sq Epi: x / Non Sq Epi: Neg.-Few / Bacteria: Negative /HPF        MICROBIOLOGY:  RECENT CULTURES:   @ 04:05 .Blood Blood-Peripheral Blood Culture PCR    No growth to date.    Growth in aerobic bottle: Gram Positive Cocci in Clusters  Coag negative staph    PCR positive for influenza A    RADIOLOGY REVIEWED:  < from: CT Angio Chest w/ IV Cont (19 @ 05:27) >    IMPRESSION:  No pulmonary embolus. Moderate aortic atherosclerosis with ulcerated   plaque in the lateral aspect of the aortic arch. No intramural hematoma,   aneurysm or dissection.    < end of copied text >

## 2019-02-06 NOTE — CONSULT NOTE ADULT - SUBJECTIVE AND OBJECTIVE BOX
ICU CONSULT  Location of Patient : [facility] ([facility])  Attending requesting Consult:Wiliam Reyes    Initial HPI on admission:  HPI:  82 yo F PMH HTN CARA/insomnia, HLD, remote smoking history, is called back to ED for positive blood cultures, recent URI and influenza just diagnosed yesterday. SHe has c/o upper respiratory symptoms and chest congestion X 3 days. She came to ED on 2/3 and was discharged with improved symptoms. She returned to ED on  with worsening symptoms and was diagnosed with influenza; sent home on tamiflu and medrol. Today she reports improved symptoms but was called back due to gram positive cocci on one culture. Sh does endorse subjective fevers and cough, prod of white sputum. She c/o chest tightness and dyspnea, but denies jaw arm or leg claudication. She denies syncope or presyncope. She has many sick contacts, her daughter and granddaughter have URIs. (2019 20:35)      BRIEF HOSPITAL COURSE: Patient seen, resting in bed. Appears anxious with pressured speech. She denies any cough, shortness of breath, wheezing. States she wants to go home. No history of COPD or asthma. Has distant history of smoking.     PAST MEDICAL & SURGICAL HISTORY:  Glaucoma  History of macular degeneration  Hypothyroidism  HTN (hypertension)  No significant past surgical history    Allergies    No Known Allergies    Intolerances      FAMILY HISTORY:  No pertinent family history in first degree relatives    Social history:      Smoking: Former smoker, quit in her teenage years     Drinking: Denies     Drug use: Denies    Review of Systems:  CONSTITUTIONAL: No fever, No chills, No fatigue  EYES: No eye pain, No visual disturbances, No discharge  ENMT:  No difficulty hearing, No tinnitus, No vertigo; No sinus or throat pain  NECK: No pain or stiffness  RESPIRATORY: Denies any cough, shortness of breath or wheezing  CARDIOVASCULAR: No chest pain, No palpitations, No dizziness, or No leg swelling  GASTROINTESTINAL: No abdominal or epigastric pain. No nausea, No vomiting, No hematemesis; No diarrhea or constipation. No melena, No hematochezia.  GENITOURINARY: No dysuria, No frequency, No hematuria, No incontinence  NEUROLOGICAL: No headaches, No memory loss, No loss of strength, No numbness, No tremors  SKIN: No itching, No burning, No rashes, No lesions   MUSCULOSKELETAL: No joint pain or swelling; No muscle, back, No extremity pain  PSYCHIATRIC: No depression, No anxiety, No mood swings, No difficulty sleeping    Medications:  acetaminophen   Tablet .. 650 milliGRAM(s) Oral every 6 hours PRN Temp greater or equal to 38C (100.4F), Mild Pain (1 - 3)  ALBUTerol/ipratropium for Nebulization 3 milliLiter(s) Nebulizer every 8 hours  amLODIPine   Tablet 10 milliGRAM(s) Oral daily  atorvastatin 20 milliGRAM(s) Oral at bedtime  benzonatate 100 milliGRAM(s) Oral three times a day PRN Cough  lactobacillus acidophilus 1 Tablet(s) Oral three times a day with meals  levothyroxine 100 MICROGram(s) Oral daily  LORazepam     Tablet 0.5 milliGRAM(s) Oral two times a day PRN Anxiety  melatonin 3 milliGRAM(s) Oral at bedtime  ondansetron Injectable 4 milliGRAM(s) IV Push every 6 hours PRN Nausea  oseltamivir 30 milliGRAM(s) Oral two times a day    MEDICATIONS  (STANDING):  ALBUTerol/ipratropium for Nebulization 3 milliLiter(s) Nebulizer every 8 hours  amLODIPine   Tablet 10 milliGRAM(s) Oral daily  atorvastatin 20 milliGRAM(s) Oral at bedtime  lactobacillus acidophilus 1 Tablet(s) Oral three times a day with meals  levothyroxine 100 MICROGram(s) Oral daily  melatonin 3 milliGRAM(s) Oral at bedtime  oseltamivir 30 milliGRAM(s) Oral two times a day    MEDICATIONS  (PRN):  acetaminophen   Tablet .. 650 milliGRAM(s) Oral every 6 hours PRN Temp greater or equal to 38C (100.4F), Mild Pain (1 - 3)  benzonatate 100 milliGRAM(s) Oral three times a day PRN Cough  LORazepam     Tablet 0.5 milliGRAM(s) Oral two times a day PRN Anxiety  ondansetron Injectable 4 milliGRAM(s) IV Push every 6 hours PRN Nausea      Home Medications:  Last Order Reconciliation Date: 19 @ 20:39 (Admission Reconciliation)  Ambien 5 mg oral tablet: 2 tab(s) orally once a day (at bedtime) (16 @ 12:56)  amLODIPine 10 mg oral tablet: 1 tab(s) orally once a day (19 @ 05:07)  aspirin 81 mg oral tablet:  (19 @ 05:07)  Ativan 0.5 mg oral tablet: 1 tab(s) orally 2 times a day, As Needed (16 @ 12:56)  Combivent Respimat 20 mcg-100 mcg/inh inhalation aerosol: 1 puff(s) inhaled 4 times a day  (19 @ 07:08)  doxycycline hyclate 100 mg oral capsule: 1 cap(s) orally 2 times a day  (19 @ 07:08)  Flonase 50 mcg/inh nasal spray: 1 spray(s) intranasally once a day  (19 @ 10:49)  gabapentin 100 mg oral capsule:  (19 @ 05:07)  hydrocodone-homatropine 5 mg-1.5 mg/5 mL oral syrup: 5 milliliter(s) orally 4 times a day MDD:20 ml (19 @ 07:08)  levothyroxine 100 mcg (0.1 mg) oral tablet: 1 tab(s) orally once a day (19 @ 05:07)  levothyroxine 88 mcg (0.088 mg) oral tablet: 1 tab(s) orally once a day (19 @ 05:07)  LORazepam 0.5 mg oral tablet: 1 tab(s) orally 2 times a day (at 9am and 5pm) MDD:1mg (16 @ 10:51)  LORazepam 0.5 mg oral tablet: 1 tab(s) orally 2 times a day (at 9am and 5pm) MDD:1mg (19 @ 05:07)  LORazepam 0.5 mg oral tablet: 1 tab(s) orally 2 times a day (at 9am and 5pm) MDD:1mg (16 @ 13:53)  Medrol Dosepak 4 mg oral tablet: 1 packet(s) orally once a day   use as directed on package (19 @ 10:49)  oseltamivir 75 mg oral capsule: 1 cap(s) orally 2 times a day  (19 @ 10:54)  pravastatin 80 mg oral tablet: 1 tab(s) orally once a day (19 @ 05:07)  Remeron 30 mg oral tablet: 1 tab(s) orally once a day (at bedtime) (19 @ 05:07)  Synthroid 88 mcg (0.088 mg) oral tablet: 1 tab(s) orally once a day (16 @ 12:56)  traZODone 150 mg oral tablet:  (19 @ 05:07)  traZODone 50 mg oral tablet: 1 tab(s) orally once a day (at bedtime) (19 @ 05:07)      LABS:                        13.2   8.1   )-----------( 267      ( 2019 07:00 )             39.2         137  |  99  |  17  ----------------------------<  239<H>  3.7   |  25  |  1.38<H>    Ca    9.1      2019 07:00  Mg     1.6         TPro  7.2  /  Alb  3.6  /  TBili  0.3  /  DBili  x   /  AST  43<H>  /  ALT  70<H>  /  AlkPhos  64  02-05    HIT ab -- 02-03 @ 04:05  HIT ab EIA --  D Dimer -411    CARDIAC MARKERS ( 2019 05:00 )  .041 ng/mL / x     / x     / x     / x      CARDIAC MARKERS ( 2019 02:00 )  .048 ng/mL / x     / x     / x     / x      CARDIAC MARKERS ( 2019 20:15 )  .057 ng/mL / x     / x     / x     / x      CARDIAC MARKERS ( 2019 14:30 )  .056 ng/mL / x     / x     / x     / x            PT/INR - ( 2019 14:30 )   PT: 10.4 sec;   INR: 0.93 ratio         PTT - ( 2019 14:30 )  PTT:26.6 sec  Urinalysis Basic - ( 2019 15:50 )    Color: Yellow / Appearance: Clear / S.010 / pH: x  Gluc: x / Ketone: Negative  / Bili: Negative / Urobili: Negative   Blood: x / Protein: 15 / Nitrite: Negative   Leuk Esterase: Negative / RBC: Negative /HPF / WBC Negative /HPF   Sq Epi: x / Non Sq Epi: Neg.-Few / Bacteria: Negative /HPF      Procalcitonin, Serum: 0.22 ng/mL (19 @ 20:15)    Serum Pro-Brain Natriuretic Peptide: 1566 pg/mL (19 @ 07:10)        CULTURES: (if applicable)    Culture - Blood (collected 19 @ 04:05)  Source: .Blood Blood-Peripheral  Gram Stain (19 @ 06:19):    Growth in aerobic bottle: Gram Positive Cocci in Clusters  Final Report (19 @ 11:01):    Growth in aerobic bottle: Coag Negative Staphylococcus    Single set isolate, possible contaminant. Contact    Microbiology if susceptibility testing clinically    indicated.    "Due to technical problems, Proteus sp. will Not be reported as part of    the BCID panel until further notice"    ***Blood Panel PCR results on this specimen are available    approximately 3 hours after the Gram stain result.***    Gram stain, PCR, and/or culture results may not always    correspond due to difference in methodologies.    ************************************************************    This PCR assay was performed using [employer].    The following targets are tested for: Enterococcus,    vancomycin resistant enterococci, Listeria monocytogenes,    coagulase negative staphylococci, S. aureus,    methicillin resistant S. aureus, Streptococcus agalactiae    (Group B), S. pneumoniae, S. pyogenes (Group A),    Acinetobacter baumannii, Enterobacter cloacae, E. coli,    Klebsiella oxytoca, K. pneumoniae, Proteus sp.,    Serratia marcescens, Haemophilus influenzae,    Neisseria meningitidis, Pseudomonas aeruginosa, Candida    albicans, C. glabrata, C krusei, C parapsilosis,    C. tropicalis and the KPC resistance gene.  Organism: Blood Culture PCR (19 @ 11:01)  Organism: Blood Culture PCR (19 @ 11:01)      -  Coagulase negative Staphylococcus: Detec      Method Type: PCR    Culture - Blood (collected 19 @ 04:05)  Source: .Blood Blood-Peripheral  Preliminary Report (19 @ 09:03):    No growth to date.    VITALS:  T(C): 36.9 (19 @ 03:46), Max: 36.9 (19 @ 03:46)  T(F): 98.5 (19 @ 03:46), Max: 98.5 (19 @ 03:46)  HR: 85 (19 05:02) (72 - 86)  BP: 155/60 (19 @ 05:02) (148/65 - 178/75)  BP(mean): --  ABP: --  ABP(mean): --  RR: 16 (19 @ 05:02) (15 - 22)  SpO2: 96% (19 05:02) (94% - 100%)  CVP(mm Hg): --  CVP(cm H2O): --    Ins and Outs     Height (cm): 157.48 (19 @ 14:11), 157.48 (19:33)  Weight (kg): 63.5 (19 14:11), 63.5 (19:33)  BMI (kg/m2): 25.6 (19 @ 14:11), 25.6 (19:33)    Physical Examination:  GENERAL:                Alert, Oriented, No acute distress.  Thin elderly female  HEENT:                    Pupils equal, reactive to light.  Symmetric. No JVD, Moist MM  PULM:                     Bilateral air entry, Clear to auscultation, No Rales, No Rhonchi, No Wheezing  CVS:                         S1, S2,  No Murmur, irregular heart rate   ABD:                        Soft, nondistended, nontender, normoactive bowel sounds   EXT:                         No edema, nontender, No Cyanosis or Clubbing   Vascular:                  Warm Extremities, Normal Capillary refill, Normal Distal Pulses  SKIN:                       Warm and well perfused, no rashes noted.   NEURO:                   Alert, oriented, interactive, nonfocal, follows commands  PSYC:                      Calm

## 2019-02-06 NOTE — ED ADULT NURSE REASSESSMENT NOTE - NS ED NURSE REASSESS COMMENT FT1
Pt mildly anxious and tearful; reassured of safety and treatment plan, pt expressed understanding. Moved into hospital bed and assisted into position of comfort.

## 2019-02-06 NOTE — CONSULT NOTE ADULT - ASSESSMENT
84 y/o female with HTN, CAD, found to have a positive influenza A infection. Pulmonary consulted as patient had wheezing upon presentation, likely due to acute viral bronchitis. At this time her symptoms have resolved. CT scan unremarkable. No hypoxia on room air.    Assessment:  1. Influenza A infection  2. Viral bronchitis     - Cont. Supportive care  - Oseltamivir   - Nebs PRN  - Encourage ambulation and hydration  - No need for steroids at this time

## 2019-02-06 NOTE — CONSULT NOTE ADULT - ASSESSMENT
83 year old woman with acute flu and bronchitis. One elevated troponin level , no acute EKG changes.  Elevated BNP noted. No evidence of fluid overload on exam or CXR  She does report chest tightness but this is in the setting of coughing and wheezing.   Doubt ACS or NSTEMI  One positive blood culture, felt to be contaminant     Plan  1. Check echo  2. Ischemia evaluation... ie stress test can be considered post resolution of flu and bronchitis for risk stratification  3. Anti viral and steroids as per Medicine    discussed with patient and her daughter.. she will followup with her primary MD post discharge   discussed with Hospitalist

## 2019-02-06 NOTE — CONSULT NOTE ADULT - ASSESSMENT
Influenza A which appears to be responding to tamiflu.  Her blood isolate is now known to be a coag negative staph and c/w a contaminant.  she does not require additional antibiotics.  I would complete 5 days of tamiflu  No ID objection to discharge planning

## 2019-02-06 NOTE — CONSULT NOTE ADULT - SUBJECTIVE AND OBJECTIVE BOX
[facility] Consultation    CHIEF COMPLAINT: Patient is a 83y old  Female who presents with a chief complaint of +blood cultures (06 Feb 2019 13:52)  patient seen and examined  history from patient and her daughter.... good reliability     HPI:  84 yo F PMH HTN CARA/insomnia, HLD, remote smoking history, is called back to ED for positive blood cultures, recent URI and influenza just diagnosed yesterday. SHe has c/o upper respiratory symptoms and chest congestion X 3 days. She came to ED on 2/3 and was discharged with improved symptoms. She returned to ED on 2/4 with worsening symptoms and was diagnosed with influenza; sent home on tamiflu and medrol. Today she reports improved symptoms but was called back due to gram positive cocci on one culture. Sh does endorse subjective fevers and cough, prod of white sputum. She c/o chest tightness and dyspnea, but denies jaw arm or leg claudication. She denies syncope or presyncope. She has many sick contacts, her daughter and granddaughter have URIs. (05 Feb 2019 20:35)  She has had intermittent chest tightness when she coughs.  No chest tightness when not coughing.  No radiation, diaphoresis.  No exertional chest pain   No edema, no orthopnea     PAST MEDICAL & SURGICAL HISTORY  no history of CAD, MI, CHF  daughter states ? atrial fibrillation in past   Glaucoma  History of macular degeneration  Hypothyroidism  HTN (hypertension)  No significant past surgical history      SOCIAL HISTORY: former smoker, social alcohol, lives with adult daughter     FAMILY HISTORY  No pertinent family history in first degree relatives    Home Medications:  aspirin 81 mg oral tablet:  (03 Feb 2019 05:07)  gabapentin 100 mg oral capsule:  (03 Feb 2019 05:07)  levothyroxine 100 mcg (0.1 mg) oral tablet: 1 tab(s) orally once a day (03 Feb 2019 05:07)  pravastatin 80 mg oral tablet: 1 tab(s) orally once a day (03 Feb 2019 05:07)  traZODone 150 mg oral tablet:  (03 Feb 2019 05:07)    MEDICATIONS  (STANDING):  ALBUTerol/ipratropium for Nebulization 3 milliLiter(s) Nebulizer every 8 hours  amLODIPine   Tablet 10 milliGRAM(s) Oral daily  atorvastatin 20 milliGRAM(s) Oral at bedtime  lactobacillus acidophilus 1 Tablet(s) Oral three times a day with meals  levothyroxine 100 MICROGram(s) Oral daily  melatonin 3 milliGRAM(s) Oral at bedtime  oseltamivir 30 milliGRAM(s) Oral two times a day  sodium chloride 0.45%. 1000 milliLiter(s) (60 mL/Hr) IV Continuous <Continuous>    MEDICATIONS  (PRN):  acetaminophen   Tablet .. 650 milliGRAM(s) Oral every 6 hours PRN Temp greater or equal to 38C (100.4F), Mild Pain (1 - 3)  benzonatate 100 milliGRAM(s) Oral three times a day PRN Cough  LORazepam     Tablet 0.5 milliGRAM(s) Oral two times a day PRN Anxiety  ondansetron Injectable 4 milliGRAM(s) IV Push every 6 hours PRN Nausea      Allergies  No Known Allergies      REVIEW OF SYSTEMS:    CONSTITUTIONAL:  feeling weak, fevers   EYES: No visual changes, No diplopia  ENMT: No throat pain , No exudate  NECK: No pain or stiffness  RESPIRATORY: cough, wheezing, dyspnea   CARDIOVASCULAR: No palpitations, dizziness, light headedness, syncope or near syncope.  No edema, no orthopnea.   GASTROINTESTINAL: No abdominal pain. No nausea, vomiting, or hematemesis; No diarrhea or constipation. No melena or hematochezia.  GENITOURINARY: No dysuria, frequency or hematuria  NEUROLOGICAL: No numbness or weakness  SKIN: No itching or rash  All other review of systems is negative unless indicated above    VITAL SIGNS:   Vital Signs Last 24 Hrs  T(C): 36.9 (06 Feb 2019 03:46), Max: 36.9 (06 Feb 2019 03:46)  T(F): 98.5 (06 Feb 2019 03:46), Max: 98.5 (06 Feb 2019 03:46)  HR: 85 (06 Feb 2019 05:02) (72 - 86)  BP: 155/60 (06 Feb 2019 05:02) (148/65 - 172/100)  BP(mean): --  RR: 16 (06 Feb 2019 05:02) (15 - 22)  SpO2: 96% (06 Feb 2019 05:02) (94% - 100%)    I&O's Summary      PHYSICAL EXAM:    Constitutional: elderly woman in NAD  Eyes:  EOMI,  Pupils round, no lesions  ENMT: no exudate or erythema  Pulmonary: scattered rhonchi and wheezing   Cardiovascular: PMI not palpable non-displaced Regular S1 and S2 l/Vl systolic murmur  Gastrointestinal: Bowel Sounds present, soft, nontender.   Lymph: No peripheral edema. No cervical lymphadenopathy.  Neurological: Alert, no focal deficits  Skin: No rashes. Changes of chronic venous stasis. No cyanosis.  Psych:  Mood & affect appropriate    LABS: All Labs Reviewed:                        13.2   8.1   )-----------( 267      ( 06 Feb 2019 07:00 )             39.2                         12.8   11.6  )-----------( 278      ( 05 Feb 2019 14:30 )             37.6                         11.3   9.4   )-----------( 220      ( 04 Feb 2019 07:10 )             32.3     06 Feb 2019 07:00    137    |  99     |  17     ----------------------------<  239    3.7     |  25     |  1.38   05 Feb 2019 14:30    138    |  101    |  19     ----------------------------<  100    3.1     |  26     |  1.13   04 Feb 2019 07:10    138    |  104    |  15     ----------------------------<  121    3.4     |  25     |  1.02     Ca    9.1        06 Feb 2019 07:00  Ca    9.2        05 Feb 2019 14:30  Ca    8.8        04 Feb 2019 07:10  Mg     1.6       05 Feb 2019 20:15    TPro  7.2    /  Alb  3.6    /  TBili  0.3    /  DBili  x      /  AST  43     /  ALT  70     /  AlkPhos  64     05 Feb 2019 14:30  TPro  6.4    /  Alb  3.1    /  TBili  0.3    /  DBili  x      /  AST  72     /  ALT  84     /  AlkPhos  57     04 Feb 2019 07:10    PT/INR - ( 05 Feb 2019 14:30 )   PT: 10.4 sec;   INR: 0.93 ratio         PTT - ( 05 Feb 2019 14:30 )  PTT:26.6 sec  CARDIAC MARKERS ( 06 Feb 2019 05:00 )  .041 ng/mL / x     / x     / x     / x      CARDIAC MARKERS ( 06 Feb 2019 02:00 )  .048 ng/mL / x     / x     / x     / x      CARDIAC MARKERS ( 05 Feb 2019 20:15 )  .057 ng/mL / x     / x     / x     / x      CARDIAC MARKERS ( 05 Feb 2019 14:30 )  .056 ng/mL / x     / x     / x     / x            02-04 @ 07:10  Pro Bnp [id number]    RADIOLOGY  < from: Xray Chest 1 View AP/PA (02.04.19 @ 07:20) >  Again noted is interstitial prominence, rather mild in degree   in the lungs.    < end of copied text >     EKG: < from: 12 Lead ECG (02.05.19 @ 16:19) >  Sinus rhythm with premature atrial complexes  Otherwise normal ECG  When compared with ECG of 05-FEB-2019 15:19,  there does not appear to be a significant interval change     < end of copied text >

## 2019-02-07 ENCOUNTER — TRANSCRIPTION ENCOUNTER (OUTPATIENT)
Age: 84
End: 2019-02-07

## 2019-02-07 VITALS
SYSTOLIC BLOOD PRESSURE: 140 MMHG | OXYGEN SATURATION: 98 % | HEART RATE: 88 BPM | DIASTOLIC BLOOD PRESSURE: 70 MMHG | TEMPERATURE: 98 F | RESPIRATION RATE: 14 BRPM

## 2019-02-07 LAB
ANION GAP SERPL CALC-SCNC: 13 MMOL/L — SIGNIFICANT CHANGE UP (ref 5–17)
BUN SERPL-MCNC: 16 MG/DL — SIGNIFICANT CHANGE UP (ref 7–23)
CALCIUM SERPL-MCNC: 9.4 MG/DL — SIGNIFICANT CHANGE UP (ref 8.4–10.5)
CHLORIDE SERPL-SCNC: 98 MMOL/L — SIGNIFICANT CHANGE UP (ref 96–108)
CO2 SERPL-SCNC: 25 MMOL/L — SIGNIFICANT CHANGE UP (ref 22–31)
CREAT SERPL-MCNC: 1.03 MG/DL — SIGNIFICANT CHANGE UP (ref 0.5–1.3)
CULTURE RESULTS: NO GROWTH — SIGNIFICANT CHANGE UP
D DIMER BLD IA.RAPID-MCNC: 383 NG/ML DDU — HIGH
GLUCOSE SERPL-MCNC: 132 MG/DL — HIGH (ref 70–99)
POTASSIUM SERPL-MCNC: 3 MMOL/L — LOW (ref 3.5–5.3)
POTASSIUM SERPL-SCNC: 3 MMOL/L — LOW (ref 3.5–5.3)
SODIUM SERPL-SCNC: 136 MMOL/L — SIGNIFICANT CHANGE UP (ref 135–145)
SPECIMEN SOURCE: SIGNIFICANT CHANGE UP

## 2019-02-07 PROCEDURE — 87150 DNA/RNA AMPLIFIED PROBE: CPT

## 2019-02-07 PROCEDURE — 96367 TX/PROPH/DG ADDL SEQ IV INF: CPT

## 2019-02-07 PROCEDURE — 80048 BASIC METABOLIC PNL TOTAL CA: CPT

## 2019-02-07 PROCEDURE — 83605 ASSAY OF LACTIC ACID: CPT

## 2019-02-07 PROCEDURE — 99239 HOSP IP/OBS DSCHRG MGMT >30: CPT

## 2019-02-07 PROCEDURE — 83735 ASSAY OF MAGNESIUM: CPT

## 2019-02-07 PROCEDURE — 96372 THER/PROPH/DIAG INJ SC/IM: CPT

## 2019-02-07 PROCEDURE — 83036 HEMOGLOBIN GLYCOSYLATED A1C: CPT

## 2019-02-07 PROCEDURE — 87631 RESP VIRUS 3-5 TARGETS: CPT

## 2019-02-07 PROCEDURE — 85610 PROTHROMBIN TIME: CPT

## 2019-02-07 PROCEDURE — 87040 BLOOD CULTURE FOR BACTERIA: CPT

## 2019-02-07 PROCEDURE — 81001 URINALYSIS AUTO W/SCOPE: CPT

## 2019-02-07 PROCEDURE — 85730 THROMBOPLASTIN TIME PARTIAL: CPT

## 2019-02-07 PROCEDURE — 96365 THER/PROPH/DIAG IV INF INIT: CPT

## 2019-02-07 PROCEDURE — 94640 AIRWAY INHALATION TREATMENT: CPT

## 2019-02-07 PROCEDURE — 85379 FIBRIN DEGRADATION QUANT: CPT

## 2019-02-07 PROCEDURE — 83880 ASSAY OF NATRIURETIC PEPTIDE: CPT

## 2019-02-07 PROCEDURE — 93306 TTE W/DOPPLER COMPLETE: CPT

## 2019-02-07 PROCEDURE — 80053 COMPREHEN METABOLIC PANEL: CPT

## 2019-02-07 PROCEDURE — 84145 PROCALCITONIN (PCT): CPT

## 2019-02-07 PROCEDURE — 99285 EMERGENCY DEPT VISIT HI MDM: CPT | Mod: 25

## 2019-02-07 PROCEDURE — 71045 X-RAY EXAM CHEST 1 VIEW: CPT

## 2019-02-07 PROCEDURE — 87086 URINE CULTURE/COLONY COUNT: CPT

## 2019-02-07 PROCEDURE — 85027 COMPLETE CBC AUTOMATED: CPT

## 2019-02-07 PROCEDURE — 99284 EMERGENCY DEPT VISIT MOD MDM: CPT | Mod: 25

## 2019-02-07 PROCEDURE — 93005 ELECTROCARDIOGRAM TRACING: CPT

## 2019-02-07 PROCEDURE — 36000 PLACE NEEDLE IN VEIN: CPT

## 2019-02-07 PROCEDURE — 84484 ASSAY OF TROPONIN QUANT: CPT

## 2019-02-07 PROCEDURE — 96375 TX/PRO/DX INJ NEW DRUG ADDON: CPT

## 2019-02-07 PROCEDURE — 36415 COLL VENOUS BLD VENIPUNCTURE: CPT

## 2019-02-07 PROCEDURE — 96361 HYDRATE IV INFUSION ADD-ON: CPT

## 2019-02-07 PROCEDURE — 71275 CT ANGIOGRAPHY CHEST: CPT

## 2019-02-07 RX ORDER — POTASSIUM CHLORIDE 20 MEQ
40 PACKET (EA) ORAL EVERY 4 HOURS
Qty: 0 | Refills: 0 | Status: DISCONTINUED | OUTPATIENT
Start: 2019-02-07 | End: 2019-02-07

## 2019-02-07 RX ORDER — BENZOCAINE AND MENTHOL 5; 1 G/100ML; G/100ML
1 LIQUID ORAL THREE TIMES A DAY
Qty: 0 | Refills: 0 | Status: DISCONTINUED | OUTPATIENT
Start: 2019-02-07 | End: 2019-02-07

## 2019-02-07 RX ADMIN — Medication 0.5 MILLIGRAM(S): at 12:02

## 2019-02-07 RX ADMIN — Medication 100 MICROGRAM(S): at 05:51

## 2019-02-07 RX ADMIN — ONDANSETRON 4 MILLIGRAM(S): 8 TABLET, FILM COATED ORAL at 10:42

## 2019-02-07 RX ADMIN — Medication 1 TABLET(S): at 09:17

## 2019-02-07 RX ADMIN — Medication 3 MILLILITER(S): at 06:18

## 2019-02-07 RX ADMIN — Medication 0.5 MILLIGRAM(S): at 06:51

## 2019-02-07 RX ADMIN — Medication 0.5 MILLIGRAM(S): at 12:56

## 2019-02-07 RX ADMIN — Medication 30 MILLIGRAM(S): at 05:51

## 2019-02-07 RX ADMIN — AMLODIPINE BESYLATE 10 MILLIGRAM(S): 2.5 TABLET ORAL at 05:51

## 2019-02-07 RX ADMIN — Medication 40 MILLIEQUIVALENT(S): at 13:55

## 2019-02-07 RX ADMIN — Medication 0.5 MILLIGRAM(S): at 02:14

## 2019-02-07 NOTE — DISCHARGE NOTE ADULT - CARE PROVIDER_API CALL
Deidra Paulino)  Internal Medicine  [address], [facility]  [place] [address]  Phone: [phone]  Fax: [phone]  Follow Up Time:

## 2019-02-07 NOTE — PROGRESS NOTE ADULT - PROBLEM SELECTOR PLAN 2
diffuse wheezing on admission, warranting IV solumedrol  Pulm Consulted- d/w Dr. Cadena   feels better, mild wheezing earlier wheezing improved  Pulm Consulted- d/w Dr. Cadena   feels better, mild wheezing earlier  will check d-dimer before discharge

## 2019-02-07 NOTE — DISCHARGE NOTE ADULT - HOSPITAL COURSE
Patient came for increasing shortness of breath and chest pain.  Found to have the flu.  Echo was normal, did have an increase in troponin and it has been recommended that patient have an outpatient stress test. CT angio 2/3 was negative.  patient suffered from increased anxiety throughout her stay.  On 2/7 patient had panic attack, psychiatry was called but medically the patient was cleared and discharged. before they could evaluated.  Dr. Paulino patient's primary physician was notified and oked outpatient ativan prescription.  Dr. Paulino to arrange outpatient psychiatric services.

## 2019-02-07 NOTE — CONSULT NOTE ADULT - SUBJECTIVE AND OBJECTIVE BOX
Follow up for      SUBJ:    complains of dyspnea " if i could only get up the secretions "        Glaucoma  History of macular degeneration  Hypothyroidism  HTN (hypertension)      MEDICATIONS  (STANDING):    MEDICATIONS  (PRN):        PHYSICAL EXAM:  Vital Signs Last 24 Hrs  T(C): 36.4 (07 Feb 2019 05:53), Max: 36.6 (06 Feb 2019 19:51)  T(F): 97.6 (07 Feb 2019 05:53), Max: 97.9 (06 Feb 2019 19:51)  HR: 94 (07 Feb 2019 06:20) (70 - 94)  BP: 183/86 (07 Feb 2019 05:53) (175/88 - 187/86)  BP(mean): --  RR: 20 (07 Feb 2019 05:53) (18 - 20)  SpO2: 99% (07 Feb 2019 06:20) (97% - 99%)    GENERAL: postured elderly hard of hearing son in law at bedside  HEAD:  Atraumatic, Normocephalic  EYES: EOMI, PERRLA, conjunctiva and sclera clear  ENT: Moist mucous membranes,  NECK: Supple, No JVD, no bruits  CHEST/LUNG: Clear to percussion bilaterally; No rales, rhonchi, wheezing, or rubs  HEART: Regular rate and rhythm; No murmurs, rubs, or gallops PMI non displaced.  ABDOMEN: Soft, Nontender, Nondistended; Bowel sounds present  EXTREMITIES:  2+ Peripheral Pulses, No clubbing, cyanosis, or edema  SKIN: No rashes or lesions  NERVOUS SYSTEM:  Cranial Nerves II-XII intact      TELEMETRY:    ECG:    < from: 12 Lead ECG (02.05.19 @ 16:19) >  Ventricular Rate 79 BPM    Atrial Rate 86 BPM    P-R Interval 160 ms    QRS Duration 72 ms    Q-T Interval 402 ms    QTC Calculation(Bezet) 460 ms    P Axis 54 degrees    R Axis -18 degrees    T Axis 59 degrees    Diagnosis Line Sinus rhythm with premature atrial complexes  Otherwise normal ECG  When compared with ECG of 05-FEB-2019 15:19,  there does not appear to be a significant interval change   Confirmed by ADITHYA MELENDEZ, PANKAJ HOFFMAN ([id number]) on 2/6/2019 8:44:56 AM    < end of copied text >    ECHO:    2/6/19 ef 55-60    LABS:                        13.2   8.1   )-----------( 267      ( 06 Feb 2019 07:00 )             39.2     02-07    136  |  98  |  16  ----------------------------<  132<H>  3.0<L>   |  25  |  1.03    Ca    9.4      07 Feb 2019 07:00  Mg     1.6     02-05    TPro  7.2  /  Alb  3.6  /  TBili  0.3  /  DBili  x   /  AST  43<H>  /  ALT  70<H>  /  AlkPhos  64  02-05    CARDIAC MARKERS ( 06 Feb 2019 05:00 )  .041 ng/mL / x     / x     / x     / x      CARDIAC MARKERS ( 06 Feb 2019 02:00 )  .048 ng/mL / x     / x     / x     / x      CARDIAC MARKERS ( 05 Feb 2019 20:15 )  .057 ng/mL / x     / x     / x     / x      CARDIAC MARKERS ( 05 Feb 2019 14:30 )  .056 ng/mL / x     / x     / x     / x          PT/INR - ( 05 Feb 2019 14:30 )   PT: 10.4 sec;   INR: 0.93 ratio         PTT - ( 05 Feb 2019 14:30 )  PTT:26.6 sec    I&O's Summary    BNP    RADIOLOGY & ADDITIONAL STUDIES:  < from: Xray Chest 1 View AP/PA (02.04.19 @ 07:20) >  EXAM:  XR CHEST AP OR PA 1V      PROCEDURE DATE:  02/04/2019        INTERPRETATION:  AP chest on February 4, 2019 at 7:03 AM. Patient has   chest pain.    Heart is magnified by technique.    Slight interstitial prominence diffusely in the lungs again noted.    Chest is similar to February 3.    IMPRESSION: Again noted is interstitial prominence, rather mild in degree   in the lungs.        LAXMI MANUEL M.D., ATTENDING RADIOLOGIST  This document has been electronically signed. Feb 4 2019  8:02AM    < end of copied text >

## 2019-02-07 NOTE — DISCHARGE NOTE ADULT - PATIENT PORTAL LINK FT
You can access the [facility] Patient Portal, offered by [facility], by registering with the following website: [email or web address]

## 2019-02-07 NOTE — DISCHARGE NOTE ADULT - MEDICATION SUMMARY - MEDICATIONS TO CHANGE
I will SWITCH the dose or number of times a day I take the medications listed below when I get home from the hospital:    oseltamivir 75 mg oral capsule  -- 1 cap(s) by mouth 2 times a day   -- Check with your doctor before becoming pregnant.  Finish all this medication unless otherwise directed by prescriber.

## 2019-02-07 NOTE — DISCHARGE NOTE ADULT - CARE PLAN
Principal Discharge DX:	Influenza  Goal:	Improved respiratory function  Assessment and plan of treatment:	No longer needing steroids improving

## 2019-02-07 NOTE — PROGRESS NOTE ADULT - PROBLEM SELECTOR PLAN 3
Gram + cocci in cluster in one bottle, ? contaminate.   ID consulted- Dr. Jimenez
Gram + cocci in cluster in one bottle, ? contaminate.   ID consulted- Dr. Jimenez

## 2019-02-07 NOTE — CONSULT NOTE ADULT - ATTENDING COMMENTS
sob  suspect flu like illness with wheezing and secretory issues in a former smoker. a d dimer is requested to screen for thrombosis and pulmonary embolism. BNP is borderline elevated may represent diastolic heart failure.      acute on chronic diastolic quiroz failure  consider diuretic prescription when flu like illness resolved ACE ARB ARNI aldactone and beta blocker are discretionary in this setting.     ashd  suggest outpatient follow up with dr Kate dodson patients' pcp. consider stress testing given cardiovascular risk. when stable from pulmonary standpoint.

## 2019-02-07 NOTE — PROGRESS NOTE ADULT - PROBLEM SELECTOR PLAN 1
continue Tamiflu; but with reduced creat. clearance, renal dosing at 30 mg BID 3/5  symptomatic Tx continue Tamiflu; but with reduced creat. clearance, renal dosing at 30 mg BID day 3/5  symptomatic Tx

## 2019-02-07 NOTE — PROGRESS NOTE ADULT - ASSESSMENT
82 yo F remote hx smoking with recently diagnosed influenza A admitted for influenza, +blood Cx, and hypokalemia. She also endorses week-long chest tightness
84 yo F remote hx smoking with recently diagnosed influenza A admitted for influenza, +blood Cx, and hypokalemia. She also endorses week-long chest tightness

## 2019-02-07 NOTE — DISCHARGE NOTE ADULT - MEDICATION SUMMARY - MEDICATIONS TO STOP TAKING
I will STOP taking the medications listed below when I get home from the hospital:    doxycycline hyclate 100 mg oral capsule  -- 1 cap(s) by mouth 2 times a day   -- Avoid prolonged or excessive exposure to direct and/or artificial sunlight while taking this medication.  Do not take this drug if you are pregnant.  Finish all this medication unless otherwise directed by prescriber.  Medication should be taken with plenty of water.    Medrol Dosepak 4 mg oral tablet  -- 1 packet(s) by mouth once a day   use as directed on package  -- It is very important that you take or use this exactly as directed.  Do not skip doses or discontinue unless directed by your doctor.  Obtain medical advice before taking any non-prescription drugs as some may affect the action of this medication.  Take with food or milk.

## 2019-02-07 NOTE — DISCHARGE NOTE ADULT - MEDICATION SUMMARY - MEDICATIONS TO TAKE
I will START or STAY ON the medications listed below when I get home from the hospital:    aspirin 81 mg oral tablet  -- Indication: For Heart health    gabapentin 100 mg oral capsule  -- Indication: For Pain    LORazepam 0.5 mg oral tablet  -- 1 tab(s) by mouth 2 times a day (at 9am and 5pm) MDD:1mg  -- Indication: For Anxiety    traZODone 150 mg oral tablet  -- Indication: For depression    pravastatin 80 mg oral tablet  -- 1 tab(s) by mouth once a day  -- Indication: For Cholesterol     Tessalon Perles 100 mg oral capsule  -- 1 cap(s) by mouth 3 times a day, As needed, Cough  -- Indication: For COugh    oseltamivir 30 mg oral capsule  -- 1 cap(s) by mouth 2 times a day  -- Indication: For flu    Combivent Respimat 20 mcg-100 mcg/inh inhalation aerosol  -- 1 puff(s) inhaled 4 times a day   -- Check with your doctor before becoming pregnant.  For inhalation only.  May cause drowsiness or dizziness.  Obtain medical advice before taking any non-prescription drugs as some may affect the action of this medication.  This drug may impair the ability to drive or operate machinery.  Use care until you become familiar with its effects.    -- Indication: For shortness of breath    amLODIPine 10 mg oral tablet  -- 1 tab(s) by mouth once a day  -- Indication: For Blood pressure    Flonase 50 mcg/inh nasal spray  -- 1 spray(s) intranasally once a day   -- For the nose.  It is very important that you take or use this exactly as directed.  Do not skip doses or discontinue unless directed by your doctor.    -- Indication: For COngestion    levothyroxine 100 mcg (0.1 mg) oral tablet  -- 1 tab(s) by mouth once a day  -- Indication: For thyroid    hydrocodone-homatropine 5 mg-1.5 mg/5 mL oral syrup  -- 5 milliliter(s) by mouth 4 times a day MDD:20 ml  -- Caution federal law prohibits the transfer of this drug to any person other  than the person for whom it was prescribed.  May cause drowsiness.  Alcohol may intensify this effect.  Use care when operating dangerous machinery.  Obtain medical advice before taking any non-prescription drugs as some may affect the action of this medication.    -- Indication: For COugh

## 2019-02-07 NOTE — PROGRESS NOTE ADULT - PROBLEM SELECTOR PLAN 8
Ativan home dose PRN anxiety with hold parameters  Trazadone Ativan home dose PRN anxiety with hold parameters  Trazadone  Extra ativan given due to panic attack  psych was consulted  Will give scrip for outpatient ativan - primary MD Dr. Paulino is aware

## 2019-02-07 NOTE — PROGRESS NOTE ADULT - SUBJECTIVE AND OBJECTIVE BOX
Patient is a 83y old  Female who presents with a chief complaint of +blood cultures (2019 14:46)      Patient seen and examined at bedside.    ALLERGIES:  No Known Allergies    MEDICATIONS:  acetaminophen   Tablet .. 650 milliGRAM(s) Oral every 6 hours PRN  ALBUTerol/ipratropium for Nebulization 3 milliLiter(s) Nebulizer every 8 hours  amLODIPine   Tablet 10 milliGRAM(s) Oral daily  atorvastatin 20 milliGRAM(s) Oral at bedtime  benzocaine 15 mG/menthol 3.6 mG Lozenge 1 Lozenge Oral three times a day PRN  benzonatate 100 milliGRAM(s) Oral three times a day PRN  guaiFENesin    Syrup 100 milliGRAM(s) Oral every 6 hours PRN  lactobacillus acidophilus 1 Tablet(s) Oral three times a day with meals  levothyroxine 100 MICROGram(s) Oral daily  LORazepam     Tablet 0.5 milliGRAM(s) Oral two times a day PRN  LORazepam   Injectable 0.5 milliGRAM(s) IntraMuscular once PRN  melatonin 3 milliGRAM(s) Oral at bedtime  ondansetron Injectable 4 milliGRAM(s) IV Push every 6 hours PRN  oseltamivir 30 milliGRAM(s) Oral two times a day  potassium chloride    Tablet ER 40 milliEquivalent(s) Oral every 4 hours  sodium chloride 0.45%. 1000 milliLiter(s) IV Continuous <Continuous>    Vital Signs Last 24 Hrs  T(F): 97.6 (2019 05:53), Max: 97.9 (2019 19:51)  HR: 94 (2019 06:20) (70 - 94)  BP: 183/86 (2019 05:53) (175/88 - 187/86)  RR: 20 (2019 05:53) (18 - 20)  SpO2: 99% (2019 06:20) (97% - 99%)  I&O's Summary    2019 07:01  -  2019 07:00  --------------------------------------------------------  IN: 180 mL / OUT: 0 mL / NET: 180 mL        PHYSICAL EXAM:  General: NAD, A/O x 3  ENT: MMM  Neck: Supple, No JVD  Lungs: Clear to auscultation bilaterally  Cardio: RRR, S1/S2, No murmurs  Abdomen: Soft, Nontender, Nondistended; Bowel sounds present  Extremities: No cyanosis, No edema    LABS:                        13.2   8.1   )-----------( 267      ( 2019 07:00 )             39.2         136  |  98  |  16  ----------------------------<  132  3.0   |  25  |  1.03    Ca    9.4      2019 07:00  Mg     1.6         TPro  7.2  /  Alb  3.6  /  TBili  0.3  /  DBili  x   /  AST  43  /  ALT  70  /  AlkPhos  64      eGFR if Non African American: 50 mL/min/1.73M2 (19 @ 07:00)  eGFR if : 58 mL/min/1.73M2 (19 @ 07:00)    PT/INR - ( 2019 14:30 )   PT: 10.4 sec;   INR: 0.93 ratio         PTT - ( 2019 14:30 )  PTT:26.6 sec    CARDIAC MARKERS ( 2019 05:00 )  .041 ng/mL / x     / x     / x     / x      CARDIAC MARKERS ( 2019 02:00 )  .048 ng/mL / x     / x     / x     / x      CARDIAC MARKERS ( 2019 20:15 )  .057 ng/mL / x     / x     / x     / x      CARDIAC MARKERS ( 2019 14:30 )  .056 ng/mL / x     / x     / x     / x                  CAPILLARY BLOOD GLUCOSE         [id number] 5.6    Urinalysis Basic - ( 2019 15:50 )    Color: Yellow / Appearance: Clear / S.010 / pH: x  Gluc: x / Ketone: Negative  / Bili: Negative / Urobili: Negative   Blood: x / Protein: 15 / Nitrite: Negative   Leuk Esterase: Negative / RBC: Negative /HPF / WBC Negative /HPF   Sq Epi: x / Non Sq Epi: Neg.-Few / Bacteria: Negative /HPF        Culture - Urine (collected 2019 15:50)  Source: .Urine Clean Catch (Midstream)  Final Report (2019 04:42):    No growth    Culture - Blood (collected 2019 14:30)  Source: .Blood Blood-Peripheral  Preliminary Report (2019 19:01):    No growth to date.    Culture - Blood (collected 2019 14:30)  Source: .Blood Blood-Peripheral  Preliminary Report (2019 19:01):    No growth to date.    Culture - Blood (collected 2019 04:05)  Source: .Blood Blood-Peripheral  Gram Stain (2019 06:19):    Growth in aerobic bottle: Gram Positive Cocci in Clusters  Final Report (2019 11:01):    Growth in aerobic bottle: Coag Negative Staphylococcus    Single set isolate, possible contaminant. Contact    Microbiology if susceptibility testing clinically    indicated.    "Due to technical problems, Proteus sp. will Not be reported as part of    the BCID panel until further notice"    ***Blood Panel PCR results on this specimen are available    approximately 3 hours after the Gram stain result.***    Gram stain, PCR, and/or culture results may not always    correspond due to difference in methodologies.    ************************************************************    This PCR assay was performed using [employer].    The following targets are tested for: Enterococcus,    vancomycin resistant enterococci, Listeria monocytogenes,    coagulase negative staphylococci, S. aureus,    methicillin resistant S. aureus, Streptococcus agalactiae    (Group B), S. pneumoniae, S. pyogenes (Group A),    Acinetobacter baumannii, Enterobacter cloacae, E. coli,    Klebsiella oxytoca, K. pneumoniae, Proteus sp.,    Serratia marcescens, Haemophilus influenzae,    Neisseria meningitidis, Pseudomonas aeruginosa, Candida    albicans, C. glabrata, C krusei, C parapsilosis,    C. tropicalis and the KPC resistance gene.  Organism: Blood Culture PCR (2019 11:01)  Organism: Blood Culture PCR (2019 11:01)      -  Coagulase negative Staphylococcus: Detec      Method Type: PCR    Culture - Blood (collected 2019 04:05)  Source: .Blood Blood-Peripheral  Preliminary Report (2019 09:03):    No growth to date.        RADIOLOGY & ADDITIONAL TESTS:    Care Discussed with Consultants/Other Providers: Patient is a 83y old  Female who presents with a chief complaint of +blood cultures (2019 14:46)      Patient seen and examined at bedside.  Patient states "I'm having a panic attack." Reports that she is having trouble breathing and chest pain.  Reports that she would feel much better if I could go home.      ALLERGIES:  No Known Allergies    MEDICATIONS:  acetaminophen   Tablet .. 650 milliGRAM(s) Oral every 6 hours PRN  ALBUTerol/ipratropium for Nebulization 3 milliLiter(s) Nebulizer every 8 hours  amLODIPine   Tablet 10 milliGRAM(s) Oral daily  atorvastatin 20 milliGRAM(s) Oral at bedtime  benzocaine 15 mG/menthol 3.6 mG Lozenge 1 Lozenge Oral three times a day PRN  benzonatate 100 milliGRAM(s) Oral three times a day PRN  guaiFENesin    Syrup 100 milliGRAM(s) Oral every 6 hours PRN  lactobacillus acidophilus 1 Tablet(s) Oral three times a day with meals  levothyroxine 100 MICROGram(s) Oral daily  LORazepam     Tablet 0.5 milliGRAM(s) Oral two times a day PRN  LORazepam   Injectable 0.5 milliGRAM(s) IntraMuscular once PRN  melatonin 3 milliGRAM(s) Oral at bedtime  ondansetron Injectable 4 milliGRAM(s) IV Push every 6 hours PRN  oseltamivir 30 milliGRAM(s) Oral two times a day  potassium chloride    Tablet ER 40 milliEquivalent(s) Oral every 4 hours  sodium chloride 0.45%. 1000 milliLiter(s) IV Continuous <Continuous>    Vital Signs Last 24 Hrs  T(F): 97.6 (2019 05:53), Max: 97.9 (2019 19:51)  HR: 94 (2019 06:20) (70 - 94)  BP: 183/86 (2019 05:53) (175/88 - 187/86)  RR: 20 (2019 05:53) (18 - 20)  SpO2: 99% (2019 06:20) (97% - 99%)  I&O's Summary    2019 07:01  -  2019 07:00  --------------------------------------------------------  IN: 180 mL / OUT: 0 mL / NET: 180 mL        PHYSICAL EXAM:  General: NAD, A/O x 3  ENT: MMM  Neck: Supple, No JVD  Lungs: Clear to auscultation bilaterally  Cardio: RRR, S1/S2, No murmurs  Abdomen: Soft, Nontender, Nondistended; Bowel sounds present  Extremities: No cyanosis, No edema    LABS:                        13.2   8.1   )-----------( 267      ( 2019 07:00 )             39.2         136  |  98  |  16  ----------------------------<  132  3.0   |  25  |  1.03    Ca    9.4      2019 07:00  Mg     1.6     -    TPro  7.2  /  Alb  3.6  /  TBili  0.3  /  DBili  x   /  AST  43  /  ALT  70  /  AlkPhos  64  02-    eGFR if Non African American: 50 mL/min/1.73M2 (19 @ 07:00)  eGFR if : 58 mL/min/1.73M2 (19 @ 07:00)    PT/INR - ( 2019 14:30 )   PT: 10.4 sec;   INR: 0.93 ratio         PTT - ( 2019 14:30 )  PTT:26.6 sec    CARDIAC MARKERS ( 2019 05:00 )  .041 ng/mL / x     / x     / x     / x      CARDIAC MARKERS ( 2019 02:00 )  .048 ng/mL / x     / x     / x     / x      CARDIAC MARKERS ( 2019 20:15 )  .057 ng/mL / x     / x     / x     / x      CARDIAC MARKERS ( 2019 14:30 )  .056 ng/mL / x     / x     / x     / x                  CAPILLARY BLOOD GLUCOSE        - [id number] 5.6    Urinalysis Basic - ( 2019 15:50 )    Color: Yellow / Appearance: Clear / S.010 / pH: x  Gluc: x / Ketone: Negative  / Bili: Negative / Urobili: Negative   Blood: x / Protein: 15 / Nitrite: Negative   Leuk Esterase: Negative / RBC: Negative /HPF / WBC Negative /HPF   Sq Epi: x / Non Sq Epi: Neg.-Few / Bacteria: Negative /HPF        Culture - Urine (collected 2019 15:50)  Source: .Urine Clean Catch (Midstream)  Final Report (2019 04:42):    No growth    Culture - Blood (collected 2019 14:30)  Source: .Blood Blood-Peripheral  Preliminary Report (2019 19:01):    No growth to date.    Culture - Blood (collected 2019 14:30)  Source: .Blood Blood-Peripheral  Preliminary Report (2019 19:01):    No growth to date.    Culture - Blood (collected 2019 04:05)  Source: .Blood Blood-Peripheral  Gram Stain (2019 06:19):    Growth in aerobic bottle: Gram Positive Cocci in Clusters  Final Report (2019 11:01):    Growth in aerobic bottle: Coag Negative Staphylococcus    Single set isolate, possible contaminant. Contact    Microbiology if susceptibility testing clinically    indicated.    "Due to technical problems, Proteus sp. will Not be reported as part of    the BCID panel until further notice"    ***Blood Panel PCR results on this specimen are available    approximately 3 hours after the Gram stain result.***    Gram stain, PCR, and/or culture results may not always    correspond due to difference in methodologies.    ************************************************************    This PCR assay was performed using [employer].    The following targets are tested for: Enterococcus,    vancomycin resistant enterococci, Listeria monocytogenes,    coagulase negative staphylococci, S. aureus,    methicillin resistant S. aureus, Streptococcus agalactiae    (Group B), S. pneumoniae, S. pyogenes (Group A),    Acinetobacter baumannii, Enterobacter cloacae, E. coli,    Klebsiella oxytoca, K. pneumoniae, Proteus sp.,    Serratia marcescens, Haemophilus influenzae,    Neisseria meningitidis, Pseudomonas aeruginosa, Candida    albicans, C. glabrata, C krusei, C parapsilosis,    C. tropicalis and the KPC resistance gene.  Organism: Blood Culture PCR (2019 11:01)  Organism: Blood Culture PCR (2019 11:01)      -  Coagulase negative Staphylococcus: Detec      Method Type: PCR    Culture - Blood (collected 2019 04:05)  Source: .Blood Blood-Peripheral  Preliminary Report (2019 09:03):    No growth to date.        RADIOLOGY & ADDITIONAL TESTS:    Care Discussed with Consultants/Other Providers: Patient is a 83y old  Female who presents with a chief complaint of +blood cultures (2019 14:46)      Patient seen and examined at bedside.  Patient states "I'm having a panic attack." Reports that she is having trouble breathing and chest pain.  Reports that she would feel much better if I could go home.      ALLERGIES:  No Known Allergies    MEDICATIONS:  acetaminophen   Tablet .. 650 milliGRAM(s) Oral every 6 hours PRN  ALBUTerol/ipratropium for Nebulization 3 milliLiter(s) Nebulizer every 8 hours  amLODIPine   Tablet 10 milliGRAM(s) Oral daily  atorvastatin 20 milliGRAM(s) Oral at bedtime  benzocaine 15 mG/menthol 3.6 mG Lozenge 1 Lozenge Oral three times a day PRN  benzonatate 100 milliGRAM(s) Oral three times a day PRN  guaiFENesin    Syrup 100 milliGRAM(s) Oral every 6 hours PRN  lactobacillus acidophilus 1 Tablet(s) Oral three times a day with meals  levothyroxine 100 MICROGram(s) Oral daily  LORazepam     Tablet 0.5 milliGRAM(s) Oral two times a day PRN  LORazepam   Injectable 0.5 milliGRAM(s) IntraMuscular once PRN  melatonin 3 milliGRAM(s) Oral at bedtime  ondansetron Injectable 4 milliGRAM(s) IV Push every 6 hours PRN  oseltamivir 30 milliGRAM(s) Oral two times a day  potassium chloride    Tablet ER 40 milliEquivalent(s) Oral every 4 hours  sodium chloride 0.45%. 1000 milliLiter(s) IV Continuous <Continuous>    Vital Signs Last 24 Hrs  T(F): 97.6 (2019 05:53), Max: 97.9 (2019 19:51)  HR: 94 (2019 06:20) (70 - 94)  BP: 183/86 (2019 05:53) (175/88 - 187/86)  RR: 20 (2019 05:53) (18 - 20)  SpO2: 99% (2019 06:20) (97% - 99%)  I&O's Summary    2019 07:01  -  2019 07:00  --------------------------------------------------------  IN: 180 mL / OUT: 0 mL / NET: 180 mL        PHYSICAL EXAM:  General: patient in distress wreathing in the bed, grabbing at throat  ENT: MMM  Neck: Supple, No JVD  Lungs: bilateral wheezing  Cardio: RRR, S1/S2, No murmurs, tachycardia  Abdomen: Soft, Nontender, Nondistended; Bowel sounds present  Extremities: No cyanosis, No edema    LABS:                        13.2   8.1   )-----------( 267      ( 2019 07:00 )             39.2         136  |  98  |  16  ----------------------------<  132  3.0   |  25  |  1.03    Ca    9.4      2019 07:00  Mg     1.6         TPro  7.2  /  Alb  3.6  /  TBili  0.3  /  DBili  x   /  AST  43  /  ALT  70  /  AlkPhos  64  02-    eGFR if Non African American: 50 mL/min/1.73M2 (19 @ 07:00)  eGFR if : 58 mL/min/1.73M2 (19 @ 07:00)    PT/INR - ( 2019 14:30 )   PT: 10.4 sec;   INR: 0.93 ratio         PTT - ( 2019 14:30 )  PTT:26.6 sec    CARDIAC MARKERS ( 2019 05:00 )  .041 ng/mL / x     / x     / x     / x      CARDIAC MARKERS ( 2019 02:00 )  .048 ng/mL / x     / x     / x     / x      CARDIAC MARKERS ( 2019 20:15 )  .057 ng/mL / x     / x     / x     / x      CARDIAC MARKERS ( 2019 14:30 )  .056 ng/mL / x     / x     / x     / x                  CAPILLARY BLOOD GLUCOSE        - [id number] 5.6    Urinalysis Basic - ( 2019 15:50 )    Color: Yellow / Appearance: Clear / S.010 / pH: x  Gluc: x / Ketone: Negative  / Bili: Negative / Urobili: Negative   Blood: x / Protein: 15 / Nitrite: Negative   Leuk Esterase: Negative / RBC: Negative /HPF / WBC Negative /HPF   Sq Epi: x / Non Sq Epi: Neg.-Few / Bacteria: Negative /HPF        Culture - Urine (collected 2019 15:50)  Source: .Urine Clean Catch (Midstream)  Final Report (2019 04:42):    No growth    Culture - Blood (collected 2019 14:30)  Source: .Blood Blood-Peripheral  Preliminary Report (2019 19:01):    No growth to date.    Culture - Blood (collected 2019 14:30)  Source: .Blood Blood-Peripheral  Preliminary Report (2019 19:01):    No growth to date.    Culture - Blood (collected 2019 04:05)  Source: .Blood Blood-Peripheral  Gram Stain (2019 06:19):    Growth in aerobic bottle: Gram Positive Cocci in Clusters  Final Report (2019 11:01):    Growth in aerobic bottle: Coag Negative Staphylococcus    Single set isolate, possible contaminant. Contact    Microbiology if susceptibility testing clinically    indicated.    "Due to technical problems, Proteus sp. will Not be reported as part of    the BCID panel until further notice"    ***Blood Panel PCR results on this specimen are available    approximately 3 hours after the Gram stain result.***    Gram stain, PCR, and/or culture results may not always    correspond due to difference in methodologies.    ************************************************************    This PCR assay was performed using [employer].    The following targets are tested for: Enterococcus,    vancomycin resistant enterococci, Listeria monocytogenes,    coagulase negative staphylococci, S. aureus,    methicillin resistant S. aureus, Streptococcus agalactiae    (Group B), S. pneumoniae, S. pyogenes (Group A),    Acinetobacter baumannii, Enterobacter cloacae, E. coli,    Klebsiella oxytoca, K. pneumoniae, Proteus sp.,    Serratia marcescens, Haemophilus influenzae,    Neisseria meningitidis, Pseudomonas aeruginosa, Candida    albicans, C. glabrata, C krusei, C parapsilosis,    C. tropicalis and the KPC resistance gene.  Organism: Blood Culture PCR (2019 11:01)  Organism: Blood Culture PCR (2019 11:01)      -  Coagulase negative Staphylococcus: Detec      Method Type: PCR    Culture - Blood (collected 2019 04:05)  Source: .Blood Blood-Peripheral  Preliminary Report (2019 09:03):    No growth to date.        RADIOLOGY & ADDITIONAL TESTS:  < from: Xray Chest 1 View AP/PA (19 @ 07:20) >  INTERPRETATION:  AP chest on 2019 at 7:03 AM. Patient has   chest pain.    Heart is magnified by technique.    Slight interstitial prominence diffusely in the lungs again noted.    Chest is similar to February 3.    IMPRESSION: Again noted is interstitial prominence, rather mild in degree   in the lungs.    < end of copied text >    Care Discussed with Consultants/Other Providers:

## 2019-02-07 NOTE — CONSULT NOTE ADULT - SUBJECTIVE AND OBJECTIVE BOX
Source:  Chart, old records.   Reliability:    Identifying Data: 82yo  female, domiciled with adult daughter, mother of 4 adult children, retired homemaker, female with HEALTH ISSUES - PROBLEM Dx:  Anxiety, Depression, Panic Disorder with Agorophobia, Acquired hypothyroidism, Essential hypertension, Hypokalemia, Chest pain, atypical, Bacteremia, Influenza.    Consulted for Panic attacks.    History of Present Illness:  82 yo F PMH HTN CARA/insomnia, HLD, remote smoking history, is called back to ED for positive blood cultures, recent URI and influenza just diagnosed yesterday. SHe has c/o upper respiratory symptoms and chest congestion X 3 days. She came to ED on 2/3 and was discharged with improved symptoms. She returned to ED on  with worsening symptoms and was diagnosed with influenza; sent home on tamiflu and medrol. Today she reports improved symptoms but was called back due to gram positive cocci on one culture. Sh does endorse subjective fevers and cough, prod of white sputum. She c/o chest tightness and dyspnea, but denies jaw arm or leg claudication. She denies syncope or presyncope. She has many sick contacts, her daughter and granddaughter have URIs. (2019 20:35)    Symptoms and concerns-    Premorbid functioning/ status-    Onset-    Precipitating factors/ stressors or triggers-      Psychiatric Review of Systems:  Mood    -Depression-   including  --depressed mood  --anhedonia  --neurovegetative symptoms    -Hypo/Jodie  including  --euphoria/elation  --irritabilty  --racing thoughts  --grandiosity  --excessive enery/ hyperactivity  --increased GDA/ risk-taking/spending/promiscuity    Dangerousness-  including  -suicidality  -aggression    Sleep Disturbance-  including  -hypersomnolence  -insomnia  -decreased need for sleep    Anxiety-  including  -panic symptoms  -worries  -intrusive thoughts  -compulsions    Psychotic spectrum symptoms-  including  -delusional material, PI, or bizarre thoughts  -perceptual disturbances/ hallucnations  - thought disorganization  -negative symptoms    Neuropsychiatric Symptoms-  including  -cognitive impairment  -altered sensorium  -abnormal movements  -seizures  -pain    Addiction Issues-  including  -withdrawal symptoms  -toxidrome  -craving    MEDICATIONS  (STANDING):  ALBUTerol/ipratropium for Nebulization 3 milliLiter(s) Nebulizer every 8 hours  amLODIPine   Tablet 10 milliGRAM(s) Oral daily  atorvastatin 20 milliGRAM(s) Oral at bedtime  lactobacillus acidophilus 1 Tablet(s) Oral three times a day with meals  levothyroxine 100 MICROGram(s) Oral daily  melatonin 3 milliGRAM(s) Oral at bedtime  oseltamivir 30 milliGRAM(s) Oral two times a day  potassium chloride    Tablet ER 40 milliEquivalent(s) Oral every 4 hours  sodium chloride 0.45%. 1000 milliLiter(s) (60 mL/Hr) IV Continuous <Continuous>    MEDICATIONS  (PRN):  acetaminophen   Tablet .. 650 milliGRAM(s) Oral every 6 hours PRN Temp greater or equal to 38C (100.4F), Mild Pain (1 - 3)  benzocaine 15 mG/menthol 3.6 mG Lozenge 1 Lozenge Oral three times a day PRN Sore Throat  benzonatate 100 milliGRAM(s) Oral three times a day PRN Cough  guaiFENesin    Syrup 100 milliGRAM(s) Oral every 6 hours PRN Cough  LORazepam     Tablet 0.5 milliGRAM(s) Oral two times a day PRN Anxiety  ondansetron Injectable 4 milliGRAM(s) IV Push every 6 hours PRN Nausea    Allergies  No Known Allergies    PAST MEDICAL & SURGICAL HISTORY:  Glaucoma  History of macular degeneration  Hypothyroidism  HTN (hypertension)  No significant past surgical history    Past Psychiatric History:  History of recurrent Major Depressive Disorder, Panic Disorder with Agoraphobia.   2 psychiatric inpatient admissions.     2005 for passive SI & depression. 2016 [facility] Voluntary admission for Major Depression & Anxiety.      Psychotropic medication trials (agent/adequacy/ effects)-Ativan, Remeron, Ativan.    Suicidality/ Aggression-No history of suicide attempts.  Substance Use History: No substance use history.    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  Domiciled with daughter.   Upbringing/ Family of Origin-[place]    Educational/ /Vocational background-    Hobbies/ Activities of Daily Living-  Legal background-  Trauma background-  Violence-    FAMILY HISTORY:  No pertinent family history in first degree relatives    T(C): 36.4 (19 @ 05:53), Max: 36.6 (19 @ 19:51)  HR: 94 (19 @ 06:20) (70 - 94)  BP: 183/86 (19 @ 05:53) (175/88 - 187/86)  RR: 20 (19 @ 05:53) (18 - 20)  SpO2: 99% (19 @ 06:20) (97% - 99%)  Wt(kg): --  Mental Status Examination:  General  Appearance-  -hygiene/grooming-  Behavior-  -PMA/R-  -AIM-  Attitude/ Cooperativity-  -eye contact-  Speech-  -rate  -rhythm  -volume  -prosody  Thought Process-  Thought Content-  -themes/preoccupations-  -perceptual disturbance-  -delusions  -bizarre/referential/paranoid ideation-  -self-harm or aggressive thoughts-  Mood-  Affect-  -quality  -stability-  -range-  -intensity-  -congruence/appropriateness-  Cognition-  -sensorium-  -orientation-  -attention-  -recall-  Impulse Control-  -state-  -trait-  Insight/Judgment-    Studies:    Laboratory testing-                        13.2   8.1   )-----------( 267      ( 2019 07:00 )             39.2     02-07    136  |  98  |  16  ----------------------------<  132<H>  3.0<L>   |  25  |  1.03    Ca    9.4      2019 07:00  Mg     1.6     02-05    TPro  7.2  /  Alb  3.6  /  TBili  0.3  /  DBili  x   /  AST  43<H>  /  ALT  70<H>  /  AlkPhos  64  02-05  LIVER FUNCTIONS - ( 2019 14:30 )  Alb: 3.6 g/dL / Pro: 7.2 g/dL / ALK PHOS: 64 U/L / ALT: 70 U/L DA / AST: 43 U/L / GGT: x           Urinalysis Basic - ( 2019 15:50 )    Color: Yellow / Appearance: Clear / S.010 / pH: x  Gluc: x / Ketone: Negative  / Bili: Negative / Urobili: Negative   Blood: x / Protein: 15 / Nitrite: Negative   Leuk Esterase: Negative / RBC: Negative /HPF / WBC Negative /HPF   Sq Epi: x / Non Sq Epi: Neg.-Few / Bacteria: Negative /HPF      PT/INR - ( 2019 14:30 )   PT: 10.4 sec;   INR: 0.93 ratio         PTT - ( 2019 14:30 )  PTT:26.6 sec    Imaging-    Examinations-    Assessment:    Recommendations:      details, or "N" if none    Work-up-   -collateral  -studies  -consults    Medication-     Other Treatment considerations-    Guidance for team/ family management-    Guidance for patient management-    Referrals-     Hospital course Follow-up  (sign off, follow, re-consult as needed, call for clearance prior to discharge)-

## 2019-02-07 NOTE — PROGRESS NOTE ADULT - PROBLEM SELECTOR PLAN 4
mild troponin elevation  -ECG NSR. Prior ECG with AF?  Cardio consulted- Dr. Olmedo mild troponin elevation  -ECG NSR.  Cardio consulted- recommend outpatient stress test

## 2019-02-08 LAB
CULTURE RESULTS: SIGNIFICANT CHANGE UP
SPECIMEN SOURCE: SIGNIFICANT CHANGE UP

## 2019-02-10 LAB
CULTURE RESULTS: SIGNIFICANT CHANGE UP
CULTURE RESULTS: SIGNIFICANT CHANGE UP
SPECIMEN SOURCE: SIGNIFICANT CHANGE UP
SPECIMEN SOURCE: SIGNIFICANT CHANGE UP

## 2019-02-11 ENCOUNTER — APPOINTMENT (OUTPATIENT)
Dept: GERIATRICS | Facility: CLINIC | Age: 84
End: 2019-02-11
Payer: MEDICARE

## 2019-02-11 VITALS
SYSTOLIC BLOOD PRESSURE: 118 MMHG | OXYGEN SATURATION: 98 % | BODY MASS INDEX: 27.19 KG/M2 | HEIGHT: 61 IN | RESPIRATION RATE: 16 BRPM | WEIGHT: 144 LBS | TEMPERATURE: 98.6 F | DIASTOLIC BLOOD PRESSURE: 70 MMHG | HEART RATE: 84 BPM

## 2019-02-11 DIAGNOSIS — Z87.09 PERSONAL HISTORY OF OTHER DISEASES OF THE RESPIRATORY SYSTEM: ICD-10-CM

## 2019-02-11 PROCEDURE — 99214 OFFICE O/P EST MOD 30 MIN: CPT | Mod: GC

## 2019-02-11 RX ORDER — VENLAFAXINE 37.5 MG/1
37.5 TABLET ORAL DAILY
Qty: 30 | Refills: 1 | Status: DISCONTINUED | COMMUNITY
Start: 2019-02-11 | End: 2019-02-11

## 2019-02-11 NOTE — PHYSICAL EXAM
[General Appearance - Alert] : alert [General Appearance - In No Acute Distress] : in no acute distress [Sclera] : the sclera and conjunctiva were normal [PERRL With Normal Accommodation] : pupils were equal in size, round, and reactive to light [Extraocular Movements] : extraocular movements were intact [Normal Oral Mucosa] : normal oral mucosa [No Oral Pallor] : no oral pallor [No Oral Cyanosis] : no oral cyanosis [Outer Ear] : the ears and nose were normal in appearance [Oropharynx] : The oropharynx was normal [Neck Appearance] : the appearance of the neck was normal [Neck Cervical Mass (___cm)] : no neck mass was observed [Jugular Venous Distention Increased] : there was no jugular-venous distention [Thyroid Diffuse Enlargement] : the thyroid was not enlarged [Thyroid Nodule] : there were no palpable thyroid nodules [Auscultation Breath Sounds / Voice Sounds] : lungs were clear to auscultation bilaterally [Heart Rate And Rhythm] : heart rate was normal and rhythm regular [Heart Sounds] : normal S1 and S2 [Heart Sounds Gallop] : no gallops [Murmurs] : no murmurs [Heart Sounds Pericardial Friction Rub] : no pericardial rub [Full Pulse] : the pedal pulses are present [Edema] : there was no peripheral edema [Bowel Sounds] : normal bowel sounds [Abdomen Soft] : soft [Abdomen Tenderness] : non-tender [Abdomen Mass (___ Cm)] : no abdominal mass palpated [Cervical Lymph Nodes Enlarged Posterior Bilaterally] : posterior cervical [Cervical Lymph Nodes Enlarged Anterior Bilaterally] : anterior cervical [Supraclavicular Lymph Nodes Enlarged Bilaterally] : supraclavicular [Axillary Lymph Nodes Enlarged Bilaterally] : axillary [Femoral Lymph Nodes Enlarged Bilaterally] : femoral [Inguinal Lymph Nodes Enlarged Bilaterally] : inguinal [No CVA Tenderness] : no ~M costovertebral angle tenderness [No Spinal Tenderness] : no spinal tenderness [Abnormal Walk] : normal gait [Nail Clubbing] : no clubbing  or cyanosis of the fingernails [Musculoskeletal - Swelling] : no joint swelling seen [Motor Tone] : muscle strength and tone were normal [Skin Color & Pigmentation] : normal skin color and pigmentation [Skin Turgor] : normal skin turgor [] : no rash [Deep Tendon Reflexes (DTR)] : deep tendon reflexes were 2+ and symmetric [Sensation] : the sensory exam was normal to light touch and pinprick [No Focal Deficits] : no focal deficits [Oriented To Time, Place, And Person] : oriented to person, place, and time [Impaired Insight] : insight and judgment were intact [Affect] : the affect was normal

## 2019-02-11 NOTE — ASSESSMENT
[FreeTextEntry1] : 83 years old female with PMH of Anxiety, HTN, HLD and hypothyroidism who presents for routine follow up\par \par ###Bronchitis in setting of Influenza: Last dose of Tamiflu today. Stable from respiratory standpoint. \par \par ###Anxiety: c/w Ativan 0.5 bid prn. add venlafaxine\par \par

## 2019-02-11 NOTE — REVIEW OF SYSTEMS
[Hoarseness] : hoarseness [As Noted in HPI] : as noted in HPI [Cough] : cough [Negative] : Heme/Lymph [Fever] : no fever [Chills] : no chills [Nasal Discharge] : no nasal discharge

## 2019-02-11 NOTE — END OF VISIT
[] : Resident [FreeTextEntry3] : hospitalized at [place] from 2/3-2/7 with SOB found to have the Flu A.\par \par d-dimer elevated, but CT angio negative for PE.\par a1c 5.6%\par uA clean\par ucx neg\par 1 blood culture +for coag neg staph likely contaminate\par \par she had uncontrolled anxiety during her stay and had a panic attack on 2/7 that responded well with ativan\par \par I spoke with discharging MD about ativan, and plan to continue with current regimen for now. add venlafaxine and f/u in 6 weeks.  may need to follow up with verónica.\par \par hypokalemia of 3.0 on discharge:\par  needs repeat bmp at follow up\par \par htn: controlled today c/w current medications\par hypothyroidisim: c/w current medications.

## 2019-02-11 NOTE — HISTORY OF PRESENT ILLNESS
[Mild] : Stage: Mild [Stable] : Status: Stable [0] : 2) Feeling down, depressed, or hopeless: Not at all [PHQ-2 Score ___] : PHQ-2 Score [unfilled] [FreeTextEntry1] : 83 years old female with PMH of Anxiety, HTN, HLD basal cell CA s/p resection, and hypothyroidism who presents for routine follow up. She was hospitalized at [place] last week for two days with Bronchitis in setting of "The Flu" and was discharged with Tamiflu, the last dose of which will be tonight. She has been doing relatively well overall and her breathing has improved significantly. She denies any fever, chills, N.V.D, sick contacts, chest pain, SOB or syncope. \par \par The morning she went to the hospital, she walker to the neighbor's house on her own in the morning and asked them to take her to the hospital, something which has not happened before. She has been taking ativan 0.5 mg twice a day which is a good regimen for her and her daugter would like to keep her on it. She has not taken Aricept as it was making her lethargic.

## 2019-02-12 ENCOUNTER — EMERGENCY (EMERGENCY)
Facility: HOSPITAL | Age: 84
LOS: 1 days | Discharge: HOME | End: 2019-02-12
Attending: EMERGENCY MEDICINE | Admitting: EMERGENCY MEDICINE
Payer: MEDICARE

## 2019-02-12 VITALS
RESPIRATION RATE: 16 BRPM | SYSTOLIC BLOOD PRESSURE: 146 MMHG | HEART RATE: 81 BPM | OXYGEN SATURATION: 99 % | TEMPERATURE: 97 F | DIASTOLIC BLOOD PRESSURE: 85 MMHG

## 2019-02-12 VITALS
OXYGEN SATURATION: 98 % | DIASTOLIC BLOOD PRESSURE: 88 MMHG | TEMPERATURE: 98 F | SYSTOLIC BLOOD PRESSURE: 143 MMHG | HEART RATE: 77 BPM | RESPIRATION RATE: 19 BRPM

## 2019-02-12 DIAGNOSIS — F41.9 ANXIETY DISORDER, UNSPECIFIED: ICD-10-CM

## 2019-02-12 LAB
ALBUMIN SERPL ELPH-MCNC: 3.8 G/DL — SIGNIFICANT CHANGE UP (ref 3.3–5)
ALP SERPL-CCNC: 56 U/L — SIGNIFICANT CHANGE UP (ref 40–120)
ALT FLD-CCNC: 18 U/L — SIGNIFICANT CHANGE UP (ref 4–33)
AMPHET UR-MCNC: NEGATIVE — SIGNIFICANT CHANGE UP
ANION GAP SERPL CALC-SCNC: 15 MMO/L — HIGH (ref 7–14)
APAP SERPL-MCNC: < 15 UG/ML — LOW (ref 15–25)
APPEARANCE UR: CLEAR — SIGNIFICANT CHANGE UP
AST SERPL-CCNC: 20 U/L — SIGNIFICANT CHANGE UP (ref 4–32)
BACTERIA # UR AUTO: SIGNIFICANT CHANGE UP
BARBITURATES UR SCN-MCNC: NEGATIVE — SIGNIFICANT CHANGE UP
BENZODIAZ UR-MCNC: NEGATIVE — SIGNIFICANT CHANGE UP
BILIRUB SERPL-MCNC: 0.7 MG/DL — SIGNIFICANT CHANGE UP (ref 0.2–1.2)
BILIRUB UR-MCNC: NEGATIVE — SIGNIFICANT CHANGE UP
BLOOD UR QL VISUAL: NEGATIVE — SIGNIFICANT CHANGE UP
BUN SERPL-MCNC: 13 MG/DL — SIGNIFICANT CHANGE UP (ref 7–23)
CALCIUM SERPL-MCNC: 8.9 MG/DL — SIGNIFICANT CHANGE UP (ref 8.4–10.5)
CANNABINOIDS UR-MCNC: NEGATIVE — SIGNIFICANT CHANGE UP
CHLORIDE SERPL-SCNC: 93 MMOL/L — LOW (ref 98–107)
CO2 SERPL-SCNC: 21 MMOL/L — LOW (ref 22–31)
COCAINE METAB.OTHER UR-MCNC: NEGATIVE — SIGNIFICANT CHANGE UP
COLOR SPEC: YELLOW — SIGNIFICANT CHANGE UP
CREAT SERPL-MCNC: 1.12 MG/DL — SIGNIFICANT CHANGE UP (ref 0.5–1.3)
EPI CELLS # UR: SIGNIFICANT CHANGE UP
ETHANOL BLD-MCNC: < 10 MG/DL — SIGNIFICANT CHANGE UP
GLUCOSE SERPL-MCNC: 171 MG/DL — HIGH (ref 70–99)
GLUCOSE UR-MCNC: 30 — SIGNIFICANT CHANGE UP
HCT VFR BLD CALC: 38.9 % — SIGNIFICANT CHANGE UP (ref 34.5–45)
HGB BLD-MCNC: 13.2 G/DL — SIGNIFICANT CHANGE UP (ref 11.5–15.5)
KETONES UR-MCNC: NEGATIVE — SIGNIFICANT CHANGE UP
LEUKOCYTE ESTERASE UR-ACNC: NEGATIVE — SIGNIFICANT CHANGE UP
MCHC RBC-ENTMCNC: 28.7 PG — SIGNIFICANT CHANGE UP (ref 27–34)
MCHC RBC-ENTMCNC: 33.9 % — SIGNIFICANT CHANGE UP (ref 32–36)
MCV RBC AUTO: 84.6 FL — SIGNIFICANT CHANGE UP (ref 80–100)
METHADONE UR-MCNC: NEGATIVE — SIGNIFICANT CHANGE UP
MUCOUS THREADS # UR AUTO: SIGNIFICANT CHANGE UP
NITRITE UR-MCNC: NEGATIVE — SIGNIFICANT CHANGE UP
NRBC # FLD: 0 K/UL — LOW (ref 25–125)
OPIATES UR-MCNC: NEGATIVE — SIGNIFICANT CHANGE UP
OXYCODONE UR-MCNC: NEGATIVE — SIGNIFICANT CHANGE UP
PCP UR-MCNC: NEGATIVE — SIGNIFICANT CHANGE UP
PH UR: 7.5 — SIGNIFICANT CHANGE UP (ref 5–8)
PLATELET # BLD AUTO: 368 K/UL — SIGNIFICANT CHANGE UP (ref 150–400)
PMV BLD: 10 FL — SIGNIFICANT CHANGE UP (ref 7–13)
POTASSIUM SERPL-MCNC: 2.9 MMOL/L — CRITICAL LOW (ref 3.5–5.3)
POTASSIUM SERPL-SCNC: 2.9 MMOL/L — CRITICAL LOW (ref 3.5–5.3)
PROT SERPL-MCNC: 6.3 G/DL — SIGNIFICANT CHANGE UP (ref 6–8.3)
PROT UR-MCNC: 70 — SIGNIFICANT CHANGE UP
RBC # BLD: 4.6 M/UL — SIGNIFICANT CHANGE UP (ref 3.8–5.2)
RBC # FLD: 12.9 % — SIGNIFICANT CHANGE UP (ref 10.3–14.5)
RBC CASTS # UR COMP ASSIST: SIGNIFICANT CHANGE UP (ref 0–?)
SALICYLATES SERPL-MCNC: < 5 MG/DL — LOW (ref 15–30)
SODIUM SERPL-SCNC: 129 MMOL/L — LOW (ref 135–145)
SP GR SPEC: 1.01 — SIGNIFICANT CHANGE UP (ref 1–1.04)
TSH SERPL-MCNC: 5.43 UIU/ML — HIGH (ref 0.27–4.2)
UROBILINOGEN FLD QL: NORMAL — SIGNIFICANT CHANGE UP
WBC # BLD: 16.26 K/UL — HIGH (ref 3.8–10.5)
WBC # FLD AUTO: 16.26 K/UL — HIGH (ref 3.8–10.5)
WBC UR QL: SIGNIFICANT CHANGE UP (ref 0–?)

## 2019-02-12 PROCEDURE — 93010 ELECTROCARDIOGRAM REPORT: CPT

## 2019-02-12 PROCEDURE — 90792 PSYCH DIAG EVAL W/MED SRVCS: CPT | Mod: GC

## 2019-02-12 PROCEDURE — 71046 X-RAY EXAM CHEST 2 VIEWS: CPT | Mod: 26

## 2019-02-12 PROCEDURE — 99284 EMERGENCY DEPT VISIT MOD MDM: CPT | Mod: 25

## 2019-02-12 RX ORDER — POTASSIUM CHLORIDE 20 MEQ
40 PACKET (EA) ORAL ONCE
Qty: 0 | Refills: 0 | Status: COMPLETED | OUTPATIENT
Start: 2019-02-12 | End: 2019-02-12

## 2019-02-12 RX ADMIN — Medication 40 MILLIEQUIVALENT(S): at 21:40

## 2019-02-12 RX ADMIN — Medication 0.5 MILLIGRAM(S): at 23:01

## 2019-02-12 NOTE — ED BEHAVIORAL HEALTH ASSESSMENT NOTE - RISK ASSESSMENT
Pt is low risk for acute self harm and aggression.  Risk factors include mood episode, anxiety, currently not engaged in treatment.  Protective factors include supportive family, stability of domicile, future-oriented, attending to ADLs.

## 2019-02-12 NOTE — ED BEHAVIORAL HEALTH NOTE - BEHAVIORAL HEALTH NOTE
Writer spoke patient’s daughter, Karla Carlson, cell [phone], in the [facility] ED, for collateral information. She reported the following:    The patient moved in with the informant, her  and children 2 ½ years ago after selling her home. She has had 2 IP episodes, at [facility], last year and a few years ago. She has refused to go to OP treatment. She has been asking and begging to be brought to the hospital for the past week, following close behind the informant and her , so her daughter drove her today.     The patient has suffered with generalized anxiety for over a year, after having the shingles. At that time, December 2017, she was given Ativan 0.5 mg prn, and had been taking it occasionally. A week ago, she got the flu with shortness of breath and chest pain. She was brought to [facility] last week and kept for a few days for the flu, and given Ativan while there. She became much more anxious since she got the flu. Since then she has been taking the Ativan bid, but has continued to suffer with anxiety. At [facility] staff informed Ms. Lezama that the patient was having panic attacks, though the daughter denied that the patient was having chest pain or shortness of breath except when she had the flu, and denied palpitations, parasthesias, flushes, chills, depersonalization, derealization, tremulousness, abdominal discomfort or dizziness. The patient has not been suffering with sad mood. She has been eating well and sleeping well with Ativan. She tends to her hygiene independently. She has not been engaging in dangerous behaviors, such as wandering, leaving the stove on, etc. She has been suffering with minimal memory loss. She verbalized passive suicidal ideation when she had the shingles, and again over the past week, saying a couple of times that she wished she wasn’t here. She has never expressed active suicidal ideation, and today said she wants to be seen at the hospital and get better. She has never engaged in self-injurious behavior. She has never been aggressive or violent with others or property, nor expressed thoughts of such behavior. The informant is a  and her  is a retired . There is a gun locked in a safe in the home, the combination to which the patient is unaware of. There has been no evidence of psychosis. The patient does not have a history of substance abuse.     The patient has been diagnosed with hypothyroidism, hypercholesteremia and HTN. The patient is currently on Lorazepam 0.5 mg bid, Levothyroxine 100 mcg qam, aspirin 81 mg qam, focus softgels 1 daily, amlodipine 10 mg hs, pravastatin 80 mg hs, and gabapentin 100 mg hs, which was started after the patient had shingles. She is medication compliant. She was prescribed venlafaxine 37.5 mg yesterday, and took one pill today on an empty stomach, resulting in vomiting.     The patient declined IP care. She has always refused OP care, and would not agree to it at this time, though the patient and informant were advised OP care by a psychiatrist is indicated. Dr. Deidra Paulino, the patient’s PCP, [phone], has given her Ativan, but has not committed to continuing to provide it.  The possibility of seeing a neurologist for it if Dr. Paulino does not continue to provide it was discussed. The patient has always refused all medical appointments, including checkups, except to see Dr. Paulino for the Ativan. The patient will be discharged with the informant transporting her home.

## 2019-02-12 NOTE — ED BEHAVIORAL HEALTH ASSESSMENT NOTE - CASE SUMMARY
Patient is an 83 year old [place] American woman, domiciled with her daughter and her family, with history of anxiety and depression, 2 inpatient psychiatric admissions (last at [facility] in 2016), was previously in outpatient treatment at [facility]; however case closed due to treatment non-adherence. She was recently medically hospitalized for influenza and is presenting to the emergency room for worsening anxiety (in setting of dyspnea secondary to recent medical illness). On exam, the patient is anxious however after receiving a dose of Ativan 0.5mg po in the ED she requests discharge stating that she feels better. She was offered and declined voluntary inpatient psychiatric admission and she does not meet criteria for involuntary admission. Recommendation for outpatient psychiatric follow-up for medication optimization was explained to both patient and her daughter; however they declined. The daughter states her mother has not followed with medical providers (including OB-GYN, neurology) for over 43 years. The daughter expressed that "I should have lied and said she is a danger to herself" to get her admitted. This was relayed to the medical provider. Also patient with abnormal electrolytes, elevated TSH and WBC which could be contributing to her presentation. Medical illness/causes should be ruled out.

## 2019-02-12 NOTE — ED ADULT TRIAGE NOTE - CHIEF COMPLAINT QUOTE
A&Ox3 brought in by daughter from home for increased anxiety pt reports midsternal chest pain and difficulty breathing, denies n/v/d respirations equal and non labored sating 99% room air

## 2019-02-12 NOTE — ED BEHAVIORAL HEALTH ASSESSMENT NOTE - OTHER PAST PSYCHIATRIC HISTORY (INCLUDE DETAILS REGARDING ONSET, COURSE OF ILLNESS, INPATIENT/OUTPATIENT TREATMENT)
2 prior psychiatric hospitalizations (last at [facility] 2016), previously in outpt tx with [facility] however stopped attending in March 2018.  Medications currently managed by PCP.  No prior suicide attempts.

## 2019-02-12 NOTE — ED PROVIDER NOTE - ENMT, MLM
Airway patent, Nasal mucosa clear. Mouth with normal mucosa. Throat has no vesicles, no oropharyngeal exudates and uvula is midline. [place]

## 2019-02-12 NOTE — ED PROVIDER NOTE - PROGRESS NOTE DETAILS
Feels better, had psych eval offered admission and outpatient psychiatry and refused. Drinking excessive water, so recommended limit to 1 liter a day and follow up with PMD K still  2.9 will give more and then d/c. Signed out to Dr Montes Klepfish: Labs notable for slight hypokalemia and hyponatremia. Pt feels anxious but otherwise asymptomatic. Repleted potassium. Pt wants to go home. Daughter at bedside comfortable w/ dc as well. Given copy of results. Outpt pmd/psych f/u.

## 2019-02-12 NOTE — ED PROVIDER NOTE - NSFOLLOWUPINSTRUCTIONS_ED_ALL_ED_FT
Follow up at [facility] daily available 914 weekdays. Brochure given  Free water restrict to 1 liter per day  Follow up with PMD   Return if symptoms worsen Take medications as prescribed.  Return for persistent fever/vomit, uncontrolled pain, worsening breathing, worsening anxiety.  Follow up with primary doctor within 1-2 days.   Ask your doctor to repeat sodium and potassium levels.   Increase potato and bannana (or other high potassium foods) over next 2-3 days.   Follow up with psychiatrist.

## 2019-02-12 NOTE — ED BEHAVIORAL HEALTH ASSESSMENT NOTE - CURRENT MEDICATION
Trazodone, Ativan 0.5mg BID, recently started on Effexor 37.5mg QD yesterday 2/11/19 by PCP, took first dose but vomited shortly thereafter.

## 2019-02-12 NOTE — ED ADULT NURSE NOTE - OBJECTIVE STATEMENT
83 year old female presents to the ER from home (lives with daughter) for increased anxiety today  Labs drawn and sent as ordered pt straight cathed for urine pt has fungal infection to perineal area and buttocks MD made aware  Pt positioned for comfort plan of care discussed with pt and daughter

## 2019-02-12 NOTE — ED PROVIDER NOTE - OBJECTIVE STATEMENT
83 year old female c/o anxiety, denies CP, SOB,fever , chills, depressed. Long standing symptoms,takes ativan 0.5 mg q8 prn, trazadone at night and started on venlafaxine yesterday with worsening anxiety, dry mouth and nausea. Recent admission for influenza A.

## 2019-02-12 NOTE — ED BEHAVIORAL HEALTH ASSESSMENT NOTE - SUMMARY
83 year-old F ,   female, [place] origin, living at home with her youngest daughter Karla and her family, with PPHX of depression and anxiety (per CVM has diagnosis of MDD without psychotic features and panic d/o with agoraphobia), 2 prior psychiatric hospitalizations (last at [facility] 2016), no prior suicide attempts, was in treatment with [facility] however was discharged from clinic after pt with poor follow up, brought in by daughter to ED for worsening anxiety, with recent medical hospitalization in last week for flu.  Per collateral symptoms began during medical hospitalization as pt anxious because of dyspnea.  On evaluation pt appears anxious, tearful at times, but attending to ADLs, denying active SI/I/P and HI, and requesting treatment for anxiety.    Given patient with recent episode of influenza, anxiety likely in setting of multiple medical conditions and lingering since discharge from medical hospitalization.  Would recommend optimizing electrolyte imbalances and ruling out possible sources of infection (pt with WBC 16) as underlying infections and electrolyte imbalances can contribute to worsening confusion/anxiety in the geriatric population. 83 year-old F ,   female, [place] origin, living at home with her youngest daughter Karla and her family, with PPHX of depression and anxiety (per CVM has diagnosis of MDD without psychotic features and panic d/o with agoraphobia), 2 prior psychiatric hospitalizations (last at [facility] 2016), no prior suicide attempts, was in treatment with [facility] however was discharged from clinic after pt with poor follow up, brought in by daughter to ED for worsening anxiety, with recent medical hospitalization in last week for flu.  Per collateral symptoms began during medical hospitalization as pt anxious because of dyspnea.  On evaluation pt appears anxious, tearful at times, but attending to ADLs, denying active SI/I/P and HI, and requesting treatment for anxiety.  Pt offered voluntary hospitalization however declines, and she does not meet criteria for involuntary hospitalization.    Discussed importance of outpatient psychiatric follow up given complexity of pt's medication regimen (Effexor, Ativan, trazodone), however pt refusing to seek outpatient psychiatric care, and only requesting for medication at this time to aid with anxiety.      Given patient with recent episode of influenza, anxiety likely in setting of multiple medical conditions and lingering since discharge from medical hospitalization.  Would recommend optimizing electrolyte imbalances and ruling out possible sources of infection (pt with WBC 16) as underlying infections and electrolyte imbalances can contribute to worsening confusion/anxiety in the geriatric population.

## 2019-02-12 NOTE — ED BEHAVIORAL HEALTH ASSESSMENT NOTE - REFERRAL / APPOINTMENT DETAILS
If patient interested can follow up with [facility] outpatient care:  [phone].  Pt may also f/u [facility] for urgent walk ins:  [phone].

## 2019-02-12 NOTE — ED BEHAVIORAL HEALTH ASSESSMENT NOTE - HPI (INCLUDE ILLNESS QUALITY, SEVERITY, DURATION, TIMING, CONTEXT, MODIFYING FACTORS, ASSOCIATED SIGNS AND SYMPTOMS)
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  Pt seen and evaluated.  She reports feeling depressed and anxious, is unable to identify triggers or length of time, frequently requesting writer to speak to her daughter as she does not know circumstances surrounding her mental health, only that she "needs to feel calm" and requesting medication to help with her anxiety.  She reports she did not get along with outpatient psychiatrist and has not been in outpatient psychiatric for past year, reports medications currently managed by PCP Dr. Paulino.  Reports trouble sleeping at night, unable to identify triggers.  Denies anhedonia, changes in appetite, trouble concentrating, changes in energy.  Reports passive suicidal ideation, stating at times she wishes "God would take me" but adamantly denies active SI/I/P and HI.  Denies h/o manic symptoms.  Denies AH/VH; no delusions elicited.  Denies recent alcohol and drug use.  Identifies two granddaughters as strong protective factors.  She does not recall names of medications aside from lorazepam and trazodone.    See ED Behavioral Health note for collateral from daughter.  Briefly, symptoms of anxiety began in past week during medical hospitalization for influenza as patient became very anxious when short of breath, although dyspnea now resolved pt continues to be anxious and frequently requesting to go to hospital.  Denies safety concerns. 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  Pt seen and evaluated.  She reports feeling depressed and anxious, is unable to identify triggers or length of time, frequently requesting writer to speak to her daughter as she does not know circumstances surrounding her mental health, only that she "needs to feel calm" and requesting medication to help with her anxiety.  She reports she did not get along with outpatient psychiatrist and has not been in outpatient psychiatric for past year, reports medications currently managed by PCP Dr. Paulino.  Reports trouble sleeping at night, unable to identify triggers.  Denies anhedonia, changes in appetite, trouble concentrating, changes in energy.  Reports passive suicidal ideation, stating at times she wishes "God would take me" but adamantly denies active SI/I/P and HI.  Denies h/o manic symptoms.  Denies AH/VH; no delusions elicited.  Denies recent alcohol and drug use.  Identifies two granddaughters as strong protective factors.  She does not recall names of medications aside from lorazepam and trazodone.    See ED Behavioral Health note for collateral from daughter.  Briefly, symptoms of anxiety began in past week during medical hospitalization at [facility] for influenza (discharged 2/7/19) as patient became very anxious when short of breath, although dyspnea now resolved pt continues to be anxious and frequently requesting to go to hospital.  Denies safety concerns. 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  Pt seen and evaluated.  She reports feeling depressed and anxious, is unable to identify triggers or length of time, frequently requesting writer to speak to her daughter as she does not know circumstances surrounding her mental health, only that she "needs to feel calm" and requesting medication to help with her anxiety.  She reports she did not get along with outpatient psychiatrist and has not been in outpatient psychiatric for past year, reports medications currently managed by PCP Dr. Paulino.  Reports trouble sleeping at night, unable to identify triggers.  Denies anhedonia, changes in appetite, trouble concentrating, changes in energy.  Reports passive suicidal ideation, stating at times she wishes "God would take me" but adamantly denies active SI/I/P and HI.  Denies h/o manic symptoms.  Denies AH/VH; no delusions elicited.  Denies recent alcohol and drug use.  Identifies two granddaughters as strong protective factors.  She does not recall names of medications aside from lorazepam and trazodone.    See ED Behavioral Health note for collateral from daughter.  Briefly, symptoms of anxiety began in past week during medical hospitalization at [facility] for influenza (discharged 2/7/19) as patient became very anxious when short of breath, although dyspnea now resolved pt continues to be anxious and frequently requesting to go to hospital.  Denies safety concerns.    [facility] checked (Reference #: [id number] ); confirms pt last dispensed 15-day supply of Ativan 0.5mg BID on 2/8/19.

## 2019-02-12 NOTE — ED PROVIDER NOTE - CLINICAL SUMMARY MEDICAL DECISION MAKING FREE TEXT BOX
Elderly female with longstanding anxiety with worsening recently- no symptoms of delirium. Will get labs, psych eval.

## 2019-02-12 NOTE — ED BEHAVIORAL HEALTH ASSESSMENT NOTE - DESCRIPTION
Anxious, superficially cooperative.  Ativan 0.5mg PO x1 given for anxiety.    Vital Signs Last 24 Hrs  T(C): 36.7 (12 Feb 2019 21:03), Max: 36.7 (12 Feb 2019 21:03)  T(F): 98.1 (12 Feb 2019 21:03), Max: 98.1 (12 Feb 2019 21:03)  HR: 77 (12 Feb 2019 21:03) (77 - 96)  BP: 143/88 (12 Feb 2019 21:03) (143/88 - 180/74)  BP(mean): --  RR: 19 (12 Feb 2019 21:03) (16 - 23)  SpO2: 98% (12 Feb 2019 21:03) (98% - 99%) Glaucoma, HTN, macular degeneration, hypothyroidism , currently lives with daughter.  Daughter reports gun in home however it is locked and pt does not know location or combination.

## 2019-02-12 NOTE — ED CLERICAL - NS ED CLERK NOTE PRE-ARRIVAL INFORMATION; ADDITIONAL PRE-ARRIVAL INFORMATION
This patient is enrolled in the [facility] Advanced/STARs readmission reduction program and has active care navigation. This patient can be followed up by the care navigation team within 24 hours. To arrange close follow-up or to obtain additional clinical information about this patient, please call the contact number above.

## 2019-02-12 NOTE — ED PROVIDER NOTE - QRS
Staples removed from abdominal incision. One and half inch long and approximately 1/4 inch deep area of outer layer of skin on right side of incision is not completely approximated. Incision appears healed in inner area. No bleeding noted. Steri strips applied and edges approximated with strips in place. Call to resident to come and assess. Dr Keen states she will not be able to come for about an hour. Updated pt who states she cannot wait due to transportation for home.  Consulted with charge nurse Ariadne. Instructed pt regarding no heavy lifting and to watch incision for opening pus or bleeding. Pt states understanding and  will call doctor if any problems.    72

## 2019-02-13 LAB
ALBUMIN SERPL ELPH-MCNC: 3.8 G/DL — SIGNIFICANT CHANGE UP (ref 3.3–5)
ALP SERPL-CCNC: 55 U/L — SIGNIFICANT CHANGE UP (ref 40–120)
ALT FLD-CCNC: 15 U/L — SIGNIFICANT CHANGE UP (ref 4–33)
ANION GAP SERPL CALC-SCNC: 14 MMO/L — SIGNIFICANT CHANGE UP (ref 7–14)
AST SERPL-CCNC: 18 U/L — SIGNIFICANT CHANGE UP (ref 4–32)
BILIRUB SERPL-MCNC: 0.8 MG/DL — SIGNIFICANT CHANGE UP (ref 0.2–1.2)
BUN SERPL-MCNC: 13 MG/DL — SIGNIFICANT CHANGE UP (ref 7–23)
CALCIUM SERPL-MCNC: 8.7 MG/DL — SIGNIFICANT CHANGE UP (ref 8.4–10.5)
CHLORIDE SERPL-SCNC: 93 MMOL/L — LOW (ref 98–107)
CO2 SERPL-SCNC: 22 MMOL/L — SIGNIFICANT CHANGE UP (ref 22–31)
CREAT SERPL-MCNC: 1.09 MG/DL — SIGNIFICANT CHANGE UP (ref 0.5–1.3)
GLUCOSE SERPL-MCNC: 134 MG/DL — HIGH (ref 70–99)
POTASSIUM SERPL-MCNC: 2.9 MMOL/L — CRITICAL LOW (ref 3.5–5.3)
POTASSIUM SERPL-SCNC: 2.9 MMOL/L — CRITICAL LOW (ref 3.5–5.3)
PROT SERPL-MCNC: 6.3 G/DL — SIGNIFICANT CHANGE UP (ref 6–8.3)
SODIUM SERPL-SCNC: 129 MMOL/L — LOW (ref 135–145)

## 2019-02-13 RX ORDER — POTASSIUM CHLORIDE 20 MEQ
40 PACKET (EA) ORAL ONCE
Qty: 0 | Refills: 0 | Status: COMPLETED | OUTPATIENT
Start: 2019-02-13 | End: 2019-02-13

## 2019-02-13 RX ORDER — POTASSIUM CHLORIDE 20 MEQ
10 PACKET (EA) ORAL
Qty: 0 | Refills: 0 | Status: COMPLETED | OUTPATIENT
Start: 2019-02-13 | End: 2019-02-13

## 2019-02-13 RX ADMIN — Medication 100 MILLIEQUIVALENT(S): at 00:37

## 2019-02-13 RX ADMIN — Medication 40 MILLIEQUIVALENT(S): at 00:38

## 2019-02-14 LAB
BACTERIA UR CULT: SIGNIFICANT CHANGE UP
SPECIMEN SOURCE: SIGNIFICANT CHANGE UP

## 2019-02-14 NOTE — H&P ADULT - PROBLEM SELECTOR PLAN 8
no Padua score 2. SCDs  PPI while on steroids yes Ativan home dose PRN anxiety with hold parameters  Call Rx in AM for trazodone clarification  -melatonin for insomnia

## 2019-02-19 ENCOUNTER — RX RENEWAL (OUTPATIENT)
Age: 84
End: 2019-02-19

## 2019-02-27 ENCOUNTER — APPOINTMENT (OUTPATIENT)
Dept: DERMATOLOGY | Facility: CLINIC | Age: 84
End: 2019-02-27
Payer: MEDICARE

## 2019-02-27 DIAGNOSIS — C44.91 BASAL CELL CARCINOMA OF SKIN, UNSPECIFIED: ICD-10-CM

## 2019-02-27 PROCEDURE — 99213 OFFICE O/P EST LOW 20 MIN: CPT

## 2019-03-18 ENCOUNTER — RX RENEWAL (OUTPATIENT)
Age: 84
End: 2019-03-18

## 2019-03-25 ENCOUNTER — APPOINTMENT (OUTPATIENT)
Dept: GERIATRICS | Facility: CLINIC | Age: 84
End: 2019-03-25

## 2019-04-11 NOTE — ED PROVIDER NOTE - RATE
15.3   7.24  )-----------( 177      ( 11 Apr 2019 15:03 )             45.9       04-11    132<L>  |  96<L>  |  6<L>  ----------------------------<  448<H>  4.3   |  21<L>  |  0.69    Ca    9.4      11 Apr 2019 16:58  Phos  2.8     04-11  Mg     1.8     04-11    TPro  7.4  /  Alb  4.2  /  TBili  0.2  /  DBili  x   /  AST  24  /  ALT  14  /  AlkPhos  72  04-11                  CAPILLARY BLOOD GLUCOSE      POCT Blood Glucose.: 381 mg/dL (11 Apr 2019 18:03)                  Radiology: CXR clear
85

## 2019-04-16 ENCOUNTER — RX RENEWAL (OUTPATIENT)
Age: 84
End: 2019-04-16

## 2019-05-01 ENCOUNTER — APPOINTMENT (OUTPATIENT)
Dept: GERIATRICS | Facility: CLINIC | Age: 84
End: 2019-05-01
Payer: MEDICARE

## 2019-05-01 ENCOUNTER — LABORATORY RESULT (OUTPATIENT)
Age: 84
End: 2019-05-01

## 2019-05-01 VITALS
SYSTOLIC BLOOD PRESSURE: 140 MMHG | RESPIRATION RATE: 15 BRPM | BODY MASS INDEX: 26.81 KG/M2 | WEIGHT: 142 LBS | DIASTOLIC BLOOD PRESSURE: 80 MMHG | TEMPERATURE: 98.6 F | OXYGEN SATURATION: 98 % | HEART RATE: 85 BPM | HEIGHT: 61 IN

## 2019-05-01 PROCEDURE — 99214 OFFICE O/P EST MOD 30 MIN: CPT

## 2019-05-01 RX ORDER — VENLAFAXINE 37.5 MG/1
37.5 TABLET ORAL DAILY
Qty: 30 | Refills: 1 | Status: DISCONTINUED | COMMUNITY
Start: 2019-02-11 | End: 2019-05-01

## 2019-05-02 DIAGNOSIS — Z87.440 PERSONAL HISTORY OF URINARY (TRACT) INFECTIONS: ICD-10-CM

## 2019-05-02 LAB
ANION GAP SERPL CALC-SCNC: 12 MMOL/L
APPEARANCE: ABNORMAL
BASOPHILS # BLD AUTO: 0.06 K/UL
BASOPHILS NFR BLD AUTO: 0.5 %
BILIRUBIN URINE: NEGATIVE
BLOOD URINE: NEGATIVE
BUN SERPL-MCNC: 16 MG/DL
CALCIUM SERPL-MCNC: 10 MG/DL
CHLORIDE SERPL-SCNC: 102 MMOL/L
CO2 SERPL-SCNC: 28 MMOL/L
COLOR: YELLOW
CREAT SERPL-MCNC: 1.02 MG/DL
EOSINOPHIL # BLD AUTO: 0.07 K/UL
EOSINOPHIL NFR BLD AUTO: 0.5 %
GLUCOSE QUALITATIVE U: NEGATIVE
GLUCOSE SERPL-MCNC: 127 MG/DL
HCT VFR BLD CALC: 40.3 %
HGB BLD-MCNC: 13.4 G/DL
IMM GRANULOCYTES NFR BLD AUTO: 0.5 %
KETONES URINE: NEGATIVE
LEUKOCYTE ESTERASE URINE: ABNORMAL
LYMPHOCYTES # BLD AUTO: 2.07 K/UL
LYMPHOCYTES NFR BLD AUTO: 15.9 %
MAN DIFF?: NORMAL
MCHC RBC-ENTMCNC: 29.9 PG
MCHC RBC-ENTMCNC: 33.3 GM/DL
MCV RBC AUTO: 90 FL
MONOCYTES # BLD AUTO: 0.86 K/UL
MONOCYTES NFR BLD AUTO: 6.6 %
NEUTROPHILS # BLD AUTO: 9.87 K/UL
NEUTROPHILS NFR BLD AUTO: 76 %
NITRITE URINE: NEGATIVE
PH URINE: 6.5
PLATELET # BLD AUTO: 333 K/UL
POTASSIUM SERPL-SCNC: 3.7 MMOL/L
PROTEIN URINE: ABNORMAL
RBC # BLD: 4.48 M/UL
RBC # FLD: 13 %
SODIUM SERPL-SCNC: 142 MMOL/L
SPECIFIC GRAVITY URINE: 1.02
TSH SERPL-ACNC: 1.36 UIU/ML
UROBILINOGEN URINE: NORMAL
WBC # FLD AUTO: 12.99 K/UL

## 2019-05-02 NOTE — ASSESSMENT
[FreeTextEntry1] : depression with anxiety: discussed medication management: start Lexapro 5mg daily.  change lorazepam to clonazepam for longer action as patient is with worsening anxiety near when her dose is needed.  Strongly encouraged to follow up with [facility].\par insomnia: c/w trazadone\par breast fold fungal infection: nystatin powder bid\par hypothyroidism: elevated tsh on prior tests, repeat level today--> wnl.  repeat in 3 months to ensure stable with change in med adherence.\par hld: c/w pravastatin\par htn: controlled/ c/w norvasc normal \par memory impairment: 26/30 11/2018 mmse, with short term memory loss  advised socialization, exercise, and medication adherence discussed. \par neuralgia: improved, c/w gabapentin for now.\par \par UTI:  ua c/w infection, called and discussed results with Dtr.

## 2019-05-02 NOTE — HISTORY OF PRESENT ILLNESS
[FreeTextEntry1] : 83 years old female with PMH of Anxiety, HTN, HLD, basal cell CA s/p MOHs, and hypothyroidism who presents for routine follow up with two of her dtrs.  \par \par MMSE 11/2018 26/30\par TSH 2/2018 5.43 (improved)\par \par She lives with Karla and her family.  \par Anxiety and depression are uncontrolled.  she has a strong hx of anxiety, now worse.  she remains on lorazepam 3x daily.  she stopped the venlafaxine.\par Family is managing medications as patient was unable to do it herself. \par Increased crying during the day, feeling overwhelmed.\par But when asked she states she has a wonderful family that supports her and she "should be happy."\par Used to follow with [facility], but she stopped going.  Family has a hard time convincing her to go.\par \par hypothyroidism: now with medication assistance for better adherence.

## 2019-05-02 NOTE — REVIEW OF SYSTEMS
[As Noted in HPI] : as noted in HPI [Loss Of Hearing] : hearing loss [Incontinence] : incontinence [Negative] : Heme/Lymph [de-identified] : chronic nail biter

## 2019-05-02 NOTE — PHYSICAL EXAM
[General Appearance - Alert] : alert [General Appearance - Well Nourished] : well nourished [General Appearance - In No Acute Distress] : in no acute distress [General Appearance - Well-Appearing] : healthy appearing [General Appearance - Well Developed] : well developed [Sclera] : the sclera and conjunctiva were normal [Extraocular Movements] : extraocular movements were intact [PERRL With Normal Accommodation] : pupils were equal in size, round, and reactive to light [Outer Ear] : the ears and nose were normal in appearance [No Oral Pallor] : no oral pallor [No Oral Cyanosis] : no oral cyanosis [Examination Of The Oral Cavity] : the lips and gums were normal [Neck Appearance] : the appearance of the neck was normal [Neck Cervical Mass (___cm)] : no neck mass was observed [Thyroid Diffuse Enlargement] : the thyroid was not enlarged [Thyroid Nodule] : there were no palpable thyroid nodules [] : no respiratory distress [Respiration, Rhythm And Depth] : normal respiratory rhythm and effort [Exaggerated Use Of Accessory Muscles For Inspiration] : no accessory muscle use [Heart Rate And Rhythm] : heart rate was normal and rhythm regular [Auscultation Breath Sounds / Voice Sounds] : lungs were clear to auscultation bilaterally [Heart Sounds] : normal S1 and S2 [Veins - Varicosity Changes] : there were no varicosital changes [Edema] : there was no peripheral edema [Breast Appearance] : normal in appearance [Bowel Sounds] : normal bowel sounds [Abdomen Soft] : soft [Abdomen Tenderness] : non-tender [Cervical Lymph Nodes Enlarged Posterior Bilaterally] : posterior cervical [Abdomen Mass (___ Cm)] : no abdominal mass palpated [Supraclavicular Lymph Nodes Enlarged Bilaterally] : supraclavicular [Cervical Lymph Nodes Enlarged Anterior Bilaterally] : anterior cervical [No CVA Tenderness] : no ~M costovertebral angle tenderness [Abnormal Walk] : normal gait [No Spinal Tenderness] : no spinal tenderness [Nail Clubbing] : no clubbing  or cyanosis of the fingernails [Involuntary Movements] : no involuntary movements were seen [Skin Color & Pigmentation] : normal skin color and pigmentation [Musculoskeletal - Swelling] : no joint swelling seen [Motor Exam] : the motor exam was normal [No Focal Deficits] : no focal deficits [FreeTextEntry1] : labile affect

## 2019-05-09 ENCOUNTER — RX RENEWAL (OUTPATIENT)
Age: 84
End: 2019-05-09

## 2019-05-09 RX ORDER — SULFAMETHOXAZOLE AND TRIMETHOPRIM 800; 160 MG/1; MG/1
800-160 TABLET ORAL
Qty: 10 | Refills: 0 | Status: DISCONTINUED | COMMUNITY
Start: 2019-05-02 | End: 2019-05-09

## 2019-05-24 ENCOUNTER — RX RENEWAL (OUTPATIENT)
Age: 84
End: 2019-05-24

## 2019-06-01 ENCOUNTER — EMERGENCY (EMERGENCY)
Facility: HOSPITAL | Age: 84
LOS: 1 days | Discharge: HOME | End: 2019-06-01
Admitting: EMERGENCY MEDICINE
Payer: MEDICARE

## 2019-06-01 ENCOUNTER — INPATIENT (INPATIENT)
Facility: HOSPITAL | Age: 84
LOS: 0 days | Discharge: HOME | End: 2019-06-01
Attending: PSYCHIATRY & NEUROLOGY | Admitting: PSYCHIATRY & NEUROLOGY
Payer: MEDICARE

## 2019-06-01 VITALS
HEART RATE: 95 BPM | SYSTOLIC BLOOD PRESSURE: 20 MMHG | TEMPERATURE: 98 F | OXYGEN SATURATION: 96 % | DIASTOLIC BLOOD PRESSURE: 117 MMHG

## 2019-06-01 VITALS
SYSTOLIC BLOOD PRESSURE: 122 MMHG | TEMPERATURE: 98 F | HEART RATE: 98 BPM | OXYGEN SATURATION: 100 % | DIASTOLIC BLOOD PRESSURE: 70 MMHG | RESPIRATION RATE: 16 BRPM

## 2019-06-01 DIAGNOSIS — F32.9 MAJOR DEPRESSIVE DISORDER, SINGLE EPISODE, UNSPECIFIED: ICD-10-CM

## 2019-06-01 LAB
ALBUMIN SERPL ELPH-MCNC: 4.5 G/DL — SIGNIFICANT CHANGE UP (ref 3.3–5)
ALP SERPL-CCNC: 61 U/L — SIGNIFICANT CHANGE UP (ref 40–120)
ALT FLD-CCNC: 11 U/L — SIGNIFICANT CHANGE UP (ref 4–33)
ANION GAP SERPL CALC-SCNC: 20 MMO/L — HIGH (ref 7–14)
APAP SERPL-MCNC: < 15 UG/ML — LOW (ref 15–25)
AST SERPL-CCNC: 18 U/L — SIGNIFICANT CHANGE UP (ref 4–32)
BASOPHILS # BLD AUTO: 0.07 K/UL — SIGNIFICANT CHANGE UP (ref 0–0.2)
BASOPHILS NFR BLD AUTO: 0.5 % — SIGNIFICANT CHANGE UP (ref 0–2)
BILIRUB SERPL-MCNC: 0.4 MG/DL — SIGNIFICANT CHANGE UP (ref 0.2–1.2)
BUN SERPL-MCNC: 19 MG/DL — SIGNIFICANT CHANGE UP (ref 7–23)
CALCIUM SERPL-MCNC: 9.5 MG/DL — SIGNIFICANT CHANGE UP (ref 8.4–10.5)
CHLORIDE SERPL-SCNC: 99 MMOL/L — SIGNIFICANT CHANGE UP (ref 98–107)
CO2 SERPL-SCNC: 17 MMOL/L — LOW (ref 22–31)
CREAT SERPL-MCNC: 0.93 MG/DL — SIGNIFICANT CHANGE UP (ref 0.5–1.3)
EOSINOPHIL # BLD AUTO: 0.07 K/UL — SIGNIFICANT CHANGE UP (ref 0–0.5)
EOSINOPHIL NFR BLD AUTO: 0.5 % — SIGNIFICANT CHANGE UP (ref 0–6)
ETHANOL BLD-MCNC: < 10 MG/DL — SIGNIFICANT CHANGE UP
GLUCOSE SERPL-MCNC: 133 MG/DL — HIGH (ref 70–99)
HCT VFR BLD CALC: 38.2 % — SIGNIFICANT CHANGE UP (ref 34.5–45)
HGB BLD-MCNC: 13 G/DL — SIGNIFICANT CHANGE UP (ref 11.5–15.5)
IMM GRANULOCYTES NFR BLD AUTO: 0.5 % — SIGNIFICANT CHANGE UP (ref 0–1.5)
LYMPHOCYTES # BLD AUTO: 1.87 K/UL — SIGNIFICANT CHANGE UP (ref 1–3.3)
LYMPHOCYTES # BLD AUTO: 14.5 % — SIGNIFICANT CHANGE UP (ref 13–44)
MCHC RBC-ENTMCNC: 29.8 PG — SIGNIFICANT CHANGE UP (ref 27–34)
MCHC RBC-ENTMCNC: 34 % — SIGNIFICANT CHANGE UP (ref 32–36)
MCV RBC AUTO: 87.6 FL — SIGNIFICANT CHANGE UP (ref 80–100)
MONOCYTES # BLD AUTO: 0.8 K/UL — SIGNIFICANT CHANGE UP (ref 0–0.9)
MONOCYTES NFR BLD AUTO: 6.2 % — SIGNIFICANT CHANGE UP (ref 2–14)
NEUTROPHILS # BLD AUTO: 10.04 K/UL — HIGH (ref 1.8–7.4)
NEUTROPHILS NFR BLD AUTO: 77.8 % — HIGH (ref 43–77)
NRBC # FLD: 0 K/UL — SIGNIFICANT CHANGE UP (ref 0–0)
PLATELET # BLD AUTO: 325 K/UL — SIGNIFICANT CHANGE UP (ref 150–400)
PMV BLD: 9.7 FL — SIGNIFICANT CHANGE UP (ref 7–13)
POTASSIUM SERPL-MCNC: 3.6 MMOL/L — SIGNIFICANT CHANGE UP (ref 3.5–5.3)
POTASSIUM SERPL-SCNC: 3.6 MMOL/L — SIGNIFICANT CHANGE UP (ref 3.5–5.3)
PROT SERPL-MCNC: 7.6 G/DL — SIGNIFICANT CHANGE UP (ref 6–8.3)
RBC # BLD: 4.36 M/UL — SIGNIFICANT CHANGE UP (ref 3.8–5.2)
RBC # FLD: 12.8 % — SIGNIFICANT CHANGE UP (ref 10.3–14.5)
SALICYLATES SERPL-MCNC: < 5 MG/DL — LOW (ref 15–30)
SODIUM SERPL-SCNC: 136 MMOL/L — SIGNIFICANT CHANGE UP (ref 135–145)
TSH SERPL-MCNC: 2.64 UIU/ML — SIGNIFICANT CHANGE UP (ref 0.27–4.2)
WBC # BLD: 12.92 K/UL — HIGH (ref 3.8–10.5)
WBC # FLD AUTO: 12.92 K/UL — HIGH (ref 3.8–10.5)

## 2019-06-01 PROCEDURE — 90792 PSYCH DIAG EVAL W/MED SRVCS: CPT | Mod: GC

## 2019-06-01 PROCEDURE — 99283 EMERGENCY DEPT VISIT LOW MDM: CPT

## 2019-06-01 RX ADMIN — Medication 1 MILLIGRAM(S): at 12:04

## 2019-06-01 RX ADMIN — Medication 1 MILLIGRAM(S): at 14:16

## 2019-06-01 NOTE — ED BEHAVIORAL HEALTH ASSESSMENT NOTE - SUICIDE PROTECTIVE FACTORS
Supportive social network or family/Future oriented/High spirituality/Identifies reasons for living/Positive therapeutic relationships

## 2019-06-01 NOTE — ED ADULT NURSE NOTE - NSIMPLEMENTINTERV_GEN_ALL_ED
Implemented All Fall Risk Interventions:  [place] to call system. Call bell, personal items and telephone within reach. Instruct patient to call for assistance. Room bathroom lighting operational. Non-slip footwear when patient is off stretcher. Physically safe environment: no spills, clutter or unnecessary equipment. Stretcher in lowest position, wheels locked, appropriate side rails in place. Provide visual cue, wrist band, yellow gown, etc. Monitor gait and stability. Monitor for mental status changes and reorient to person, place, and time. Review medications for side effects contributing to fall risk. Reinforce activity limits and safety measures with patient and family.

## 2019-06-01 NOTE — ED BEHAVIORAL HEALTH NOTE - BEHAVIORAL HEALTH NOTE
Writer met with pt's daughter Tracey Topor  who provided the following information.  Pt was visiting her daughter Tracey in [place] this weekend.  Pt's daughter brought her to [facility] for admission to [facility] stating patient is tearful and anxious.  Pt typically resides with her daughter Karla Núñez, son in law and 2 granddaughters.  Pt has a long history of depression/anxiety with poor treatment compliance.  Patient for the past year has periods of crying spells with shakiness.  Patient is treated by her Primary care with 0.25mg of Clonazepam for anxiety. Pt has an appointment with psychiatry [facility] [address] with Dr. Guillen.  Patient reportedly wants to cancel the appointment for psychiatry.  Pt is eating and drinking ok.  She states patient is just not functioning well and family is afraid to leave her alone.  Pt makes statements such as she would be at peace if she were to die. Patient has not made any suicide attempts, has not talked about any plan to hurt herself.  Pt's daughter does feel that patient requires inpatient psychiatric treatment as she is disruptive at night waking her daughter asking her for more medication and crying.

## 2019-06-01 NOTE — ED BEHAVIORAL HEALTH ASSESSMENT NOTE - PSYCHIATRIC ISSUES AND PLAN (INCLUDE STANDING AND PRN MEDICATION)
Klonopin 0.25mg po bid, Trazodone 150mg po qhs, Lexapro 5mg po daily, haldol 2.5mg PO/IM Q6hrs PRN for severe agitation

## 2019-06-01 NOTE — ED ADULT TRIAGE NOTE - PAIN: PRESENCE, MLM
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 [address] )      Plan:  Bacteremia due to Klebsiella, presumed cholangitis:  -continue Ancef as per ID  -repeat blood cultures done on 10/4/2017, results are no growth times 24 hours-appreciate ID  -MRCP/MRI of the abdomen done on October/4/2017 shows no enhancing mass seen within the gallbladder within the adjacent liver parenchyma  The lobulated focal area which was described on the recent ultrasound which appeared avascular is likely due to tumefactive sludge  Dilated intrahepatic biliary ducts  There is moderate smooth narrowing of the common bile duct in the region of the stephon hepatitis  This is new from the previous study however there is no adjacent soft tissue mass seen  -GI has discussed with [facility]  They are recommending 14 days of antibiotics and to avoid ERCP if possible      Diarrhea:  -as per the patient his diarrhea is decreasing  -C diff toxin negative     End-stage renal disease:  -patient received hemodialysis today     Anemia of chronic disease, pancytopenia:  -hemoglobin stable status post PRBCs at 9 0  -appreciate Hematology     Severe protein-calorie malnutrition - as evidenced by weight loss,  poor oral intake > 4days,   appearance of fat and muscle wasting, in a pt with a BMI of 18, on Hemodialysis, and Castleman's Disease   -   requiring Nutritional support and monitoring of I/Os        VTE Pharmacologic Prophylaxis:   Pharmacologic: Heparin  Mechanical VTE Prophylaxis in Place: No    Patient Centered Rounds: I have performed bedside rounds with nursing staff today  Education and Discussions with Family / Patient:  Patient    Time Spent for Care: 15 minutes  More than 50% of total time spent on counseling and coordination of care as described above  Current Length of Stay: 6 day(s)    Current Patient Status: Inpatient   Certification Statement: The patient will continue to require additional inpatient hospital stay due to Cholangitis    Discharge Plan / Estimated Discharge Date:  2-3 days    Code Status: Level 1 - Full Code      Subjective:   No new complaints today  Objective:     Vitals:   Temp (24hrs), Av 9 °F (36 6 °C), Min:96 5 °F (35 8 °C), Max:98 3 °F (36 8 °C)    HR:  [] 100  Resp:  [16-20] 18  BP: (120-139)/(58-74) 139/64  SpO2:  [95 %-96 %] 96 %  Body mass index is 19 74 kg/m²  Input and Output Summary (last 24 hours):        Intake/Output Summary (Last 24 hours) at 10/06/17 1240  Last data filed at 10/05/17 2110   Gross per 24 hour   Intake              350 ml   Output                0 ml   Net              350 ml       Physical Exam:     Physical Exam  Gen: NAD, AAOx3, cachectic, ill-appearing  Eyes: EOMI, PERRLA, no scleral icterus  ENMT:  Oropharynx clear of erythema or exudates, no nasal discharge, no otic discharge, moist mucous membranes  Neck:  Supple  Lymph:  No anterior or posterior cervical or supraclavicular lymphadenopathy  Cardiovascular:  Regular rate and rhythm, normal S1-S2, no murmurs, rubs, or gallops  Lungs:  Clear to auscultation bilaterally, no wheezes, or rales, or rhonchi  Abdomen:  Positive bowel sounds, soft, nontender, nondistended, no palpable organomegaly   Skin:  Intact, no obvious lesions or rashes, no edema  Neuro: Cranial nerves 2-12 are intact, non-focal, 5/5 strength in all 4 extremities    Additional Data:     Labs:      Results from last 7 days  Lab Units 10/06/17  1044   WBC Thousand/uL 3 69*   HEMOGLOBIN g/dL 9 0*   HEMATOCRIT % 29 7*   PLATELETS Thousands/uL 139*   NEUTROS PCT % 73   LYMPHS PCT % 17   MONOS PCT % 8   EOS PCT % 2       Results from last 7 days  Lab Units 10/05/17  0929   SODIUM mmol/L 138   POTASSIUM mmol/L 3 8   CHLORIDE mmol/L 103   CO2 mmol/L 30   BUN mg/dL 25   CREATININE mg/dL 3 25*   CALCIUM mg/dL 7 8*   TOTAL PROTEIN g/dL 7 8   BILIRUBIN TOTAL mg/dL 0 40   ALK PHOS U/L 383*   ALT U/L 15   AST U/L 26   GLUCOSE RANDOM mg/dL 118       Results from last 7 days  Lab Units 09/30/17  1333   INR  1 39*       * I Have Reviewed All Lab Data Listed Above  * Additional Pertinent Lab Tests Reviewed: [facility] Admission Reviewed    Recent Cultures (last 7 days):       Results from last 7 days  Lab Units 10/04/17  1836 10/04/17  1747 10/01/17  2113 10/01/17  1631 10/01/17  1616   BLOOD CULTURE  No Growth at 24 hrs  No Growth at 24 hrs   --  Klebsiella pneumoniae* Klebsiella pneumoniae*   GRAM STAIN RESULT   --   --   --  Gram negative rods Gram negative rods   C DIFF TOXIN B   --   --  NEGATIVE for C difficle toxin by PCR    --   --        Last 24 Hours Medication List:     cefazolin 2,000 mg Intravenous Once   epoetin brionna 10,000 Units Intravenous Once per day on Mon Wed Fri   heparin (porcine) 5,000 Units Subcutaneous Q12H [facility]   ketotifen 1 drop Both Eyes BID   mirtazapine 15 mg Oral HS   pancrelipase (Lip-Prot-Amyl) 48,000 Units Oral TID With Meals   pantoprazole 40 mg Intravenous Q24H [facility]        Today, Patient Was Seen By: Negrita Mtz MD    ** Please Note: This note has been constructed using a voice recognition system   ** denies pain/discomfort

## 2019-06-01 NOTE — ED BEHAVIORAL HEALTH ASSESSMENT NOTE - CASE SUMMARY
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  At this time pt is reporting anxiety and depression symptoms.  She has been attending to ADLs, is denying active SI/I/P and HI.  After patient received second dose of Ativan 1mg po, she showed symptomatic beneft and requested to be discharged.  She continued to deny suicidal ideation and was agreeable to going to the appointment at [facility] on 06/10/2019.  Patient now declines voluntary admission and does not meet criteria for involuntary hospitalization as per mental health hygiene law.  No acute risk to self or others noted at this time.

## 2019-06-01 NOTE — ED BEHAVIORAL HEALTH ASSESSMENT NOTE - OTHER PAST PSYCHIATRIC HISTORY (INCLUDE DETAILS REGARDING ONSET, COURSE OF ILLNESS, INPATIENT/OUTPATIENT TREATMENT)
2 prior psychiatric hospitalizations (last at [facility] 2016), previously in outpt tx with [facility] however stopped attending in March 2018.  Medications currently managed by PCP.  No prior suicide attempts. Has been seen in [facility] but discharged bc of poor attendance.

## 2019-06-01 NOTE — ED BEHAVIORAL HEALTH ASSESSMENT NOTE - FAMILY DETAILS
Telephone Encounter by Diane Quiñones at 05/26/17 08:42 AM     Author:  Diane Quiñones Service:  (none) Author Type:  Patient      Filed:  05/26/17 08:44 AM Encounter Date:  3/2/2017 Status:  Signed     :  Diane Quiñones (Patient )            Pt states she is calling to schedule her colonoscopy. Please call Pt back when available.[SW1.1M]       Revision History        User Key Date/Time User Provider Type Action    > SW1.1 05/26/17 08:44 AM Diane Quiñones Patient  Sign    M - Manual             Daughter, son-in-law, two granddaughters

## 2019-06-01 NOTE — ED ADULT NURSE NOTE - OBJECTIVE STATEMENT
pt received  pt comes to depression with her daughter. pt arrives crying pt denies SI/HI ETOH or drugs. pt is just nervous and depressed with life. pt is uncontrollably crying. pt is given PO ativan to try and calm her down. pt is calm and cooperative. pt undressed by PES and belongings secured. blood work obtained. safety maintained. medical and psych clearance pending. awaiting further orders Will continue to monitor the pt

## 2019-06-01 NOTE — ED BEHAVIORAL HEALTH ASSESSMENT NOTE - DESCRIPTION
crying, superficially cooperative. requires a lot of redirection.  Ativan 1mg PO x2 given for anxiety.    Vital Signs Last 24 Hrs  T(C): 36.8 (01 Jun 2019 12:07), Max: 36.8 (01 Jun 2019 12:07)  T(F): 98.2 (01 Jun 2019 12:07), Max: 98.2 (01 Jun 2019 12:07)  HR: 98 (01 Jun 2019 12:07) (95 - 98)  BP: 122/70 (01 Jun 2019 12:07) (20/117 - 122/70)  BP(mean): --  RR: 16 (01 Jun 2019 12:07) (16 - 16)  SpO2: 100% (01 Jun 2019 12:07) (96% - 100%) Glaucoma, HTN, macular degeneration, hypothyroidism , currently lives with daughter.  Daughter reports gun in home however it is locked and pt does not know location or combination.

## 2019-06-01 NOTE — ED BEHAVIORAL HEALTH ASSESSMENT NOTE - HPI (INCLUDE ILLNESS QUALITY, SEVERITY, DURATION, TIMING, CONTEXT, MODIFYING FACTORS, ASSOCIATED SIGNS AND SYMPTOMS)
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    Pt seen and evaluated.  As soon as writer walked in she started requesting medication and prior to that, nursing report she had been crying for almost 2 hours straight. It is difficult to redirect patient but when she's distracted and talking about her life she stops crying. She cannot pinpoint exactly when her depression started worsening and when she started to be more tearful but has trouble controlling her emotions. As per her daughter, she has not been able to stop crying and went to stay with her other daughter in [place] but daughter in [place] could not console patient. Patient is compliant with medications (Klonopin 0.25mg po bid, trazodone 150mg po qhs and lexapro 5mg po qhs) prescribed by PCP as she has a long history of stopping psychiatric appts. She reports not sleeping well because she sits and cries most of the day but admits to fair appetite and enjoys spending time with her family. She is denying any suicidal or homicidal ideation and says she loves life and just wants to feel better. She is hopeful, future oriented and denying any anhedonia at this time.     Patient is also denying any psychotic symptoms (no AH/VH, paranoid delusions) and denying any manic symptoms. Denies recent alcohol and drug use.      See ED Behavioral Health note for collateral from daughter.    istop: [id number] - Klonopin 0.5mg po bid (30 day supply sent) and dispensed on 05/28/2019.

## 2019-06-01 NOTE — ED BEHAVIORAL HEALTH ASSESSMENT NOTE - OTHER
Medical problems crying hysterically, requiring a lot of redirection Provided support, reassurance and psychoeducation.

## 2019-06-01 NOTE — ED BEHAVIORAL HEALTH ASSESSMENT NOTE - RISK ASSESSMENT
Pt is at a moderate risk for self harm and/or aggression.  Risk factors include mood episode, anxiety, currently not engaged in treatment, inability to stop crying, panic symptoms, unable to function with family, non-adherent with medications/appointments.  Protective factors include supportive family, stability of domicile, future-oriented, attending to ADLs. Even though she is not an imminent danger to self or others, she would benefit from psychiatric hospitalization for stability. Pt is at a moderate risk for self harm and/or aggression.  Risk factors include mood episode, anxiety, currently not engaged in treatment, inability to stop crying, panic symptoms, unable to function with family, non-adherent with medications/appointments.  Protective factors include supportive family, stability of domicile, future-oriented, attending to ADLs. Pt is at a low risk for self harm and/or aggression.  Risk factors include mood episode, anxiety, currently not engaged in treatment, inability to stop crying, panic symptoms, unable to function with family, non-adherent with medications/appointments.  Protective factors include supportive family, stability of domicile, future-oriented, attending to ADLs.

## 2019-06-01 NOTE — ED BEHAVIORAL HEALTH ASSESSMENT NOTE - SUMMARY
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  At this time pt is reporting severe anxiety and depression and is tearful throughout the interview. She has been attending to ADLs, is denying active SI/I/P and HI, and perseverating on requesting more medication "to help her".  Pt offered voluntary hospitalization and accepts it for mood and medication stabilization. 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  At this time pt is reporting severe anxiety and depression and is tearful throughout the interview. She has been attending to ADLs, is denying active SI/I/P and HI, and perseverating on requesting more medication "to help her".  Pt offered voluntary hospitalization and accepts it for mood and medication stabilization.    After patient received second dose of Ativan 1mg po, she calmed down, stopped crying, was more pleasant and cooperative and requested to be discharged. She continued to deny suicidal ideation and was agreeable to going to the appointment at [facility] on 06/10/2019. Daughter agreeable with discharge plan.

## 2019-06-01 NOTE — ED BEHAVIORAL HEALTH ASSESSMENT NOTE - MEDICAL ISSUES AND PLAN (INCLUDE STANDING AND PRN MEDICATION)
Levothyroxine 100mcg Daily, Pravastatin 80mg Daily, amlodipine 10mg po daily, latanoprost each eye QHS,

## 2019-06-01 NOTE — ED PROVIDER NOTE - OBJECTIVE STATEMENT
83 year old female with a history of psych illness and HTN came to the ED because she his severely depressed. She has been crying all day. Denies any suicidal thought and is non- homicidal. No other complaints offered.

## 2019-06-10 ENCOUNTER — OUTPATIENT (OUTPATIENT)
Dept: OUTPATIENT SERVICES | Facility: HOSPITAL | Age: 84
LOS: 1 days | Discharge: HOME | End: 2019-06-10
Payer: MEDICARE

## 2019-06-10 PROCEDURE — 90792 PSYCH DIAG EVAL W/MED SRVCS: CPT

## 2019-06-21 ENCOUNTER — RX RENEWAL (OUTPATIENT)
Age: 84
End: 2019-06-21

## 2019-07-01 ENCOUNTER — RX RENEWAL (OUTPATIENT)
Age: 84
End: 2019-07-01

## 2019-07-10 NOTE — ED PROVIDER NOTE - ENMT, MLM
[address] ED  eMERGENCY dEPARTMENT eNCOUnter        Pt Name: Tracey Anderson  MRN: [id number]  [place] 1997  Date of evaluation: 7/10/2019  Provider: DEEP Reyes CNP  PCP: No primary care provider on file. ED Attending: No att. providers found    [address] [facility]       Chief Complaint   Patient presents with    Abdominal Pain     pt. states she took 2 pregnancy tests that were +, LMP . states she has had abd pain x3 weeks. denies vaginal bleeding       HISTORY OF PRESENT ILLNESS   (Location/Symptom, Timing/Onset, Context/Setting, Quality, Duration, Modifying Factors, Severity)  Note limiting factors. Tracey Anderson is a 25 y.o. female for lower abd pain. Onset was 3 weeks. Duration has been since the onset. Context includes pt states that she has taken home pregnancy tests that have been positive and she is now having lower abd pain. Pt thinks she is  . Pt denies any vaginal bleeding. Her LMP at the end of may. Alleviating factors include nothing. Aggravating factors include nothing. Pain is 0/10. nothing has been used for pain today. Nursing Notes were all reviewed and agreed with or any disagreements were addressed  in the HPI. REVIEW OF SYSTEMS  (2-9 systems for level 4, 10 or more for level 5)     Review of Systems   Constitutional: Negative for fever. Respiratory: Negative for shortness of breath. Cardiovascular: Negative for chest pain. Gastrointestinal: Positive for abdominal pain. Genitourinary: Negative for difficulty urinating, vaginal bleeding, vaginal discharge and vaginal pain. All other systems reviewed and are negative. Positivesand Pertinent negatives as per HPI. Except as noted above in the ROS, all other systems were reviewed and negative.        PAST MEDICAL HISTORY     Past Medical History:   Diagnosis Date    ADHD (attention deficit hyperactivity disorder)     Asthma          SURGICAL HISTORY       Past note for interpretation of EKG. RADIOLOGY:   Transvaginal ultrasound interpreted by radiologist for  Impression:    Tiny hypoechoic focus consistent with a gestational sac in the uterine fundus  measuring 4 weeks 2 days.  No fetal pole or yolk sac identified.  Overall  findings may be due to very early normal intrauterine pregnancy, the fetal  pole and yolk sac not visible at this time due to small size.  This may also  be due to anembryonic pregnancy or pseudo gestational sac related to occult  ectopic pregnancy.  Follow-up is needed clinically, with follow-up pelvic  ultrasound as indicated clinically.  No evidence of abnormality otherwise            Interpretation per the Radiologist below, if available at the time of this note:    US OB TRANSVAGINAL   Final Result   Tiny hypoechoic focus consistent with a gestational sac in the uterine fundus   measuring 4 weeks 2 days. No fetal pole or yolk sac identified. Overall   findings may be due to very early normal intrauterine pregnancy, the fetal   pole and yolk sac not visible at this time due to small size. This may also   be due to anembryonic pregnancy or pseudo gestational sac related to occult   ectopic pregnancy. Follow-up is needed clinically, with follow-up pelvic   ultrasound as indicated clinically. No evidence of abnormality otherwise. US OB LESS THAN 14 WEEKS SINGLE OR FIRST GESTATION   Final Result   Tiny hypoechoic focus consistent with a gestational sac in the uterine fundus   measuring 4 weeks 2 days. No fetal pole or yolk sac identified. Overall   findings may be due to very early normal intrauterine pregnancy, the fetal   pole and yolk sac not visible at this time due to small size. This may also   be due to anembryonic pregnancy or pseudo gestational sac related to occult   ectopic pregnancy. Follow-up is needed clinically, with follow-up pelvic   ultrasound as indicated clinically. No evidence of abnormality otherwise. No results found. PROCEDURES   Unless otherwise noted below, none     Procedures    CRITICAL CARE TIME   N/A    CONSULTS:  None      EMERGENCY DEPARTMENT COURSE and DIFFERENTIAL DIAGNOSIS/MDM:   Vitals:    Vitals:    07/10/19 1320   BP: 124/80   Pulse: 90   Resp: 18   Temp: 98.6 °F (37 °C)   TempSrc: Oral   SpO2: 97%   Weight: 140 lb (63.5 kg)   Height: 4' 11\" (1.499 m)       Patient was given the following medications:  Medications - No data to display    Patient was seen and evaluated by myself and Dr. Addis Lemos. Patient here today for concerns for lower abdominal cramping. Patient reports that she is taken multiple pregnancy tests at home and they have been positive. Patient states that she has had cramping but no vaginal bleeding or discharge. On exam she is awake and alert hemodynamically stable nontoxic in appearance. Lab values of all been reviewed and interpreted. Patient was be positive. Her pregnancy test was also positive. Ultrasound was completed and evaluated. Patient will be discharged home with a prescription for prenatal vitamin. She was given a prescription to have her quantitative pregnancy repeated in 3 days. She was given a PCP and encouraged to follow-up and establish care. She was also encouraged to follow-up with the OB/GYN. She was encouraged to return to the ED for any worsening pain. Patient was discharged home with all questions answered. The patient tolerated their visit well. They were seen and evaluated by the attendingphysician, No att. providers found who agreed with the assessment and plan. The patient and / or the family were informed of the results of any tests, a time was given to answer questions, a plan was proposed and they agreed Conchis Knott. FINAL IMPRESSION      1.  Less than 8 weeks gestation of pregnancy          DISPOSITION/PLAN   DISPOSITION Decision To Discharge 07/10/2019 03:50:56 PM      PATIENT [facility]FERR[facility]:  [facility]  [address] [facility]  [phone]    If symptoms worsen    *[address] [facility],[address]    Schedule an appointment as soon as possible for a visit in 3 days  for re-evaluation      DISCHARGE MEDICATIONS:  New Prescriptions    PRENATAL VIT-FE FUMARATE-FA (PRENATAL VITAMINS) 28-0.8 MG TABS    Take 1 tablet by mouth daily       DISCONTINUED MEDICATIONS:  Discontinued Medications    FAMOTIDINE (PEPCID) 20 MG TABLET    Take 1 tablet by mouth 2 times daily    ONDANSETRON (ZOFRAN ODT) 4 MG DISINTEGRATING TABLET    Take 1 tablet by mouth every 8 hours as needed for Nausea    ONDANSETRON (ZOFRAN) 4 MG TABLET    Take 1 tablet by mouth every 8 hours as needed for Nausea              (Please note that portions of this note were completed with a voice recognition program.  Efforts were made to edit the dictations but occasionally words are mis-transcribed.)    DEEP Barker CNP (electronically signed)       DEEP Barker CNP  07/10/19 5552 Airway patent, Nasal mucosa clear. Mouth with normal mucosa. Throat has no vesicles, no oropharyngeal exudates and uvula is midline.

## 2019-07-17 DIAGNOSIS — F33.2 MAJOR DEPRESSIVE DISORDER, RECURRENT SEVERE WITHOUT PSYCHOTIC FEATURES: ICD-10-CM

## 2019-07-22 ENCOUNTER — OUTPATIENT (OUTPATIENT)
Dept: OUTPATIENT SERVICES | Facility: HOSPITAL | Age: 84
LOS: 1 days | Discharge: HOME | End: 2019-07-22
Payer: MEDICARE

## 2019-08-07 LAB
ALBUMIN SERPL ELPH-MCNC: 4.3 G/DL — SIGNIFICANT CHANGE UP (ref 3.3–5)
ALP SERPL-CCNC: 57 U/L — SIGNIFICANT CHANGE UP (ref 40–120)
ALT FLD-CCNC: 12 U/L — SIGNIFICANT CHANGE UP (ref 4–33)
ANION GAP SERPL CALC-SCNC: 14 MMO/L — SIGNIFICANT CHANGE UP (ref 7–14)
AST SERPL-CCNC: 14 U/L — SIGNIFICANT CHANGE UP (ref 4–32)
BILIRUB SERPL-MCNC: 0.4 MG/DL — SIGNIFICANT CHANGE UP (ref 0.2–1.2)
BUN SERPL-MCNC: 22 MG/DL — SIGNIFICANT CHANGE UP (ref 7–23)
CALCIUM SERPL-MCNC: 9.9 MG/DL — SIGNIFICANT CHANGE UP (ref 8.4–10.5)
CHLORIDE SERPL-SCNC: 99 MMOL/L — SIGNIFICANT CHANGE UP (ref 98–107)
CO2 SERPL-SCNC: 27 MMOL/L — SIGNIFICANT CHANGE UP (ref 22–31)
CREAT SERPL-MCNC: 1.03 MG/DL — SIGNIFICANT CHANGE UP (ref 0.5–1.3)
FOLATE SERPL-MCNC: 17.5 NG/ML — SIGNIFICANT CHANGE UP (ref 4.7–20)
GLUCOSE SERPL-MCNC: 83 MG/DL — SIGNIFICANT CHANGE UP (ref 70–99)
POTASSIUM SERPL-MCNC: 3.5 MMOL/L — SIGNIFICANT CHANGE UP (ref 3.5–5.3)
POTASSIUM SERPL-SCNC: 3.5 MMOL/L — SIGNIFICANT CHANGE UP (ref 3.5–5.3)
PROT SERPL-MCNC: 7.4 G/DL — SIGNIFICANT CHANGE UP (ref 6–8.3)
SODIUM SERPL-SCNC: 140 MMOL/L — SIGNIFICANT CHANGE UP (ref 135–145)
TSH SERPL-MCNC: 4.54 UIU/ML — HIGH (ref 0.27–4.2)
VIT B12 SERPL-MCNC: 618 PG/ML — SIGNIFICANT CHANGE UP (ref 200–900)

## 2019-08-07 PROCEDURE — 99214 OFFICE O/P EST MOD 30 MIN: CPT

## 2019-08-26 ENCOUNTER — NON-APPOINTMENT (OUTPATIENT)
Age: 84
End: 2019-08-26

## 2019-08-26 ENCOUNTER — LABORATORY RESULT (OUTPATIENT)
Age: 84
End: 2019-08-26

## 2019-08-26 ENCOUNTER — APPOINTMENT (OUTPATIENT)
Dept: GERIATRICS | Facility: CLINIC | Age: 84
End: 2019-08-26
Payer: MEDICARE

## 2019-08-26 VITALS
DIASTOLIC BLOOD PRESSURE: 70 MMHG | SYSTOLIC BLOOD PRESSURE: 142 MMHG | OXYGEN SATURATION: 96 % | HEART RATE: 65 BPM | HEIGHT: 61 IN | WEIGHT: 153.2 LBS | BODY MASS INDEX: 28.92 KG/M2 | RESPIRATION RATE: 16 BRPM | TEMPERATURE: 97.8 F

## 2019-08-26 PROCEDURE — 99215 OFFICE O/P EST HI 40 MIN: CPT

## 2019-08-26 PROCEDURE — 93000 ELECTROCARDIOGRAM COMPLETE: CPT

## 2019-08-26 RX ORDER — NYSTATIN 100000 1/G
100000 POWDER TOPICAL TWICE DAILY
Qty: 1 | Refills: 2 | Status: DISCONTINUED | COMMUNITY
Start: 2019-05-01 | End: 2019-08-26

## 2019-08-26 RX ORDER — LIDOCAINE 50 MG/G
5 PATCH CUTANEOUS
Qty: 2 | Refills: 2 | Status: DISCONTINUED | COMMUNITY
Start: 2018-01-08 | End: 2019-08-26

## 2019-08-26 RX ORDER — GABAPENTIN 100 MG/1
100 CAPSULE ORAL
Qty: 60 | Refills: 1 | Status: DISCONTINUED | COMMUNITY
Start: 2018-01-08 | End: 2019-08-26

## 2019-08-26 RX ORDER — ZOSTER VACCINE RECOMBINANT, ADJUVANTED 50 MCG/0.5
50 KIT INTRAMUSCULAR
Qty: 1 | Refills: 0 | Status: DISCONTINUED | OUTPATIENT
Start: 2018-06-11 | End: 2019-08-26

## 2019-08-26 RX ORDER — ESCITALOPRAM OXALATE 5 MG/1
5 TABLET ORAL
Qty: 30 | Refills: 2 | Status: DISCONTINUED | COMMUNITY
Start: 2019-05-01 | End: 2019-08-26

## 2019-08-26 RX ORDER — LATANOPROST/PF 0.005 %
0.01 DROPS OPHTHALMIC (EYE)
Qty: 1 | Refills: 0 | Status: ACTIVE | COMMUNITY
Start: 2019-08-26

## 2019-08-28 ENCOUNTER — RESULT REVIEW (OUTPATIENT)
Age: 84
End: 2019-08-28

## 2019-08-28 LAB
ANION GAP SERPL CALC-SCNC: 10 MMOL/L
BASOPHILS # BLD AUTO: 0.06 K/UL
BASOPHILS NFR BLD AUTO: 0.6 %
BUN SERPL-MCNC: 18 MG/DL
CALCIUM SERPL-MCNC: 9.7 MG/DL
CHLORIDE SERPL-SCNC: 100 MMOL/L
CO2 SERPL-SCNC: 27 MMOL/L
CREAT SERPL-MCNC: 1.06 MG/DL
EOSINOPHIL # BLD AUTO: 0.1 K/UL
EOSINOPHIL NFR BLD AUTO: 0.9 %
GLUCOSE SERPL-MCNC: 102 MG/DL
HCT VFR BLD CALC: 40.1 %
HGB BLD-MCNC: 12.9 G/DL
IMM GRANULOCYTES NFR BLD AUTO: 0.3 %
LYMPHOCYTES # BLD AUTO: 1.77 K/UL
LYMPHOCYTES NFR BLD AUTO: 16.8 %
MAN DIFF?: NORMAL
MCHC RBC-ENTMCNC: 29.1 PG
MCHC RBC-ENTMCNC: 32.2 GM/DL
MCV RBC AUTO: 90.5 FL
MONOCYTES # BLD AUTO: 0.7 K/UL
MONOCYTES NFR BLD AUTO: 6.6 %
NEUTROPHILS # BLD AUTO: 7.89 K/UL
NEUTROPHILS NFR BLD AUTO: 74.8 %
PLATELET # BLD AUTO: 293 K/UL
POTASSIUM SERPL-SCNC: 3.9 MMOL/L
RBC # BLD: 4.43 M/UL
RBC # FLD: 13.2 %
SODIUM SERPL-SCNC: 137 MMOL/L
TSH SERPL-ACNC: 7.26 UIU/ML
WBC # FLD AUTO: 10.55 K/UL

## 2019-08-28 PROCEDURE — 99214 OFFICE O/P EST MOD 30 MIN: CPT

## 2019-08-28 NOTE — ASSESSMENT
[FreeTextEntry1] : depression with anxiety: planning for ECT, and follow up with [facility].\par -c/w fluoxetine 10mg 2 tabs daily\par clonazepam 0.5mg BID\par trazadone 150mg qhs\par (no longer on lexapro, due to geene cept swab. \par -BMP wnl, stable\par -EKG today showing normal sinus rhythm at rate of 60 without ischemic changes.\par \par insomnia: c/w trazadone\par hypothyroidism: elevated tsh on prior tests likely due to med nonadherence, repeat level today.\par hld: c/w pravastatin\par htn: elevated today.  start losartan and c/w noravsc,  check bmp.\par memory impairment: 26/30 11/2018 mmse, with short term memory loss  advised socialization, exercise, and depression/anxiety treatment.\par neuralgia: no longer requires gabapentin.\par leukocytosis: has had chronic mild elevated wbc ~11.  repeat improved at 10.  no evidence of infection or recurrent infections.  continue to monitor.\par \par \par fax to [phone]  Dr. Ray Haq  phone [phone]

## 2019-08-28 NOTE — PHYSICAL EXAM
[General Appearance - Alert] : alert [General Appearance - In No Acute Distress] : in no acute distress [General Appearance - Well Nourished] : well nourished [General Appearance - Well Developed] : well developed [General Appearance - Well-Appearing] : healthy appearing [Sclera] : the sclera and conjunctiva were normal [PERRL With Normal Accommodation] : pupils were equal in size, round, and reactive to light [Extraocular Movements] : extraocular movements were intact [No Oral Pallor] : no oral pallor [No Oral Cyanosis] : no oral cyanosis [Examination Of The Oral Cavity] : the lips and gums were normal [Outer Ear] : the ears and nose were normal in appearance [Neck Appearance] : the appearance of the neck was normal [Neck Cervical Mass (___cm)] : no neck mass was observed [Thyroid Nodule] : there were no palpable thyroid nodules [Thyroid Diffuse Enlargement] : the thyroid was not enlarged [] : no respiratory distress [Respiration, Rhythm And Depth] : normal respiratory rhythm and effort [Exaggerated Use Of Accessory Muscles For Inspiration] : no accessory muscle use [Auscultation Breath Sounds / Voice Sounds] : lungs were clear to auscultation bilaterally [Heart Rate And Rhythm] : heart rate was normal and rhythm regular [Murmurs] : no murmurs [Heart Sounds] : normal S1 and S2 [Edema] : there was no peripheral edema [Veins - Varicosity Changes] : there were no varicosital changes [Bowel Sounds] : normal bowel sounds [Abdomen Soft] : soft [Abdomen Tenderness] : non-tender [Abdomen Mass (___ Cm)] : no abdominal mass palpated [No CVA Tenderness] : no ~M costovertebral angle tenderness [No Spinal Tenderness] : no spinal tenderness [Abnormal Walk] : normal gait [Nail Clubbing] : no clubbing  or cyanosis of the fingernails [Musculoskeletal - Swelling] : no joint swelling seen [Involuntary Movements] : no involuntary movements were seen [Skin Color & Pigmentation] : normal skin color and pigmentation [Motor Exam] : the motor exam was normal [No Focal Deficits] : no focal deficits [FreeTextEntry1] : labile affect

## 2019-08-28 NOTE — HISTORY OF PRESENT ILLNESS
[FreeTextEntry1] : 84 years old female with PMH of Depression and Anxiety, HTN, HLD, basal cell CA s/p MOHs, and hypothyroidism who presents for routine follow up with her oldest dtr.\par She has been followin with Dr. Guillen with [facility].  Had Gene cept swab in July 2019, showing high metabolizer of Lexapro -- so she was changed to Fluoxetine 10mg (2 tabs daily).  And she is planning for ECT with Dr. Haq as her symptoms are still uncontrolled.\par \par \par Family is managing medications as patient was unable to do it herself. \par \par hypothyroidism: now with medication assistance for better adherence.  last tsh wnl.  repeat today.\par htn:  home bp readings elevated, few 160's and 180's systolic.    on norvasc 10mg daily.  with good adherence.\par

## 2019-09-16 PROCEDURE — 90870 ELECTROCONVULSIVE THERAPY: CPT

## 2019-09-16 RX ORDER — MIDAZOLAM HYDROCHLORIDE 1 MG/ML
1 INJECTION, SOLUTION INTRAMUSCULAR; INTRAVENOUS ONCE
Refills: 0 | Status: DISCONTINUED | OUTPATIENT
Start: 2019-09-16 | End: 2019-09-16

## 2019-09-16 RX ADMIN — MIDAZOLAM HYDROCHLORIDE 1 MILLIGRAM(S): 1 INJECTION, SOLUTION INTRAMUSCULAR; INTRAVENOUS at 12:30

## 2019-09-16 RX ADMIN — MIDAZOLAM HYDROCHLORIDE 1 MILLIGRAM(S): 1 INJECTION, SOLUTION INTRAMUSCULAR; INTRAVENOUS at 12:45

## 2019-09-18 PROCEDURE — 90870 ELECTROCONVULSIVE THERAPY: CPT

## 2019-09-18 RX ORDER — MIDAZOLAM HYDROCHLORIDE 1 MG/ML
1 INJECTION, SOLUTION INTRAMUSCULAR; INTRAVENOUS ONCE
Refills: 0 | Status: DISCONTINUED | OUTPATIENT
Start: 2019-09-18 | End: 2019-09-18

## 2019-09-18 RX ADMIN — MIDAZOLAM HYDROCHLORIDE 1 MILLIGRAM(S): 1 INJECTION, SOLUTION INTRAMUSCULAR; INTRAVENOUS at 11:25

## 2019-09-23 ENCOUNTER — APPOINTMENT (OUTPATIENT)
Dept: GERIATRICS | Facility: CLINIC | Age: 84
End: 2019-09-23
Payer: MEDICARE

## 2019-09-23 VITALS
WEIGHT: 148.6 LBS | BODY MASS INDEX: 28.05 KG/M2 | OXYGEN SATURATION: 96 % | RESPIRATION RATE: 16 BRPM | TEMPERATURE: 98.1 F | HEART RATE: 68 BPM | DIASTOLIC BLOOD PRESSURE: 70 MMHG | SYSTOLIC BLOOD PRESSURE: 162 MMHG | HEIGHT: 61 IN

## 2019-09-23 DIAGNOSIS — H40.9 UNSPECIFIED GLAUCOMA: ICD-10-CM

## 2019-09-23 DIAGNOSIS — B37.2 CANDIDIASIS OF SKIN AND NAIL: ICD-10-CM

## 2019-09-23 PROCEDURE — 99214 OFFICE O/P EST MOD 30 MIN: CPT | Mod: GC

## 2019-09-23 RX ORDER — NETARSUDIL 0.2 MG/ML
0.02 SOLUTION/ DROPS OPHTHALMIC; TOPICAL DAILY
Refills: 0 | Status: ACTIVE | COMMUNITY
Start: 2019-09-23

## 2019-09-23 RX ORDER — NYSTATIN 100000 [USP'U]/G
100000 CREAM TOPICAL TWICE DAILY
Qty: 1 | Refills: 3 | Status: ACTIVE | COMMUNITY
Start: 2017-06-14 | End: 1900-01-01

## 2019-09-23 NOTE — PHYSICAL EXAM
[Well Nourished] : well nourished [No Acute Distress] : no acute distress [Normal Sclera/Conjunctiva] : normal sclera/conjunctiva [EOMI] : extraocular movements intact [Normal Outer Ear/Nose] : the outer ears and nose were normal in appearance [Supple] : supple [No Respiratory Distress] : no respiratory distress  [No Accessory Muscle Use] : no accessory muscle use [Normal Rate] : normal rate  [Regular Rhythm] : with a regular rhythm [Non Tender] : non-tender [Soft] : abdomen soft [Grossly Normal Strength/Tone] : grossly normal strength/tone [Coordination Grossly Intact] : coordination grossly intact [No Focal Deficits] : no focal deficits [Normal Affect] : the affect was normal [Memory Grossly Normal] : memory grossly normal [Normal Insight/Judgement] : insight and judgment were intact [de-identified] : erythema mild, within breast folds bilaterally

## 2019-09-23 NOTE — END OF VISIT
[] : Resident [FreeTextEntry3] : patient seen and examined with resident Dr. Trinh.  Improved anxiety and depression with ECT.  verified medication adherence.  HTN uncontrolled based on home BP readings.  start lisinopril and repeat labwork in 1-2 weeks.  repeat tsh on levothyroxine 112mcg.\par

## 2019-09-23 NOTE — REVIEW OF SYSTEMS
[Depression] : depression [Memory Loss] : memory loss [Fever] : no fever [Chills] : no chills [Vision Problems] : no vision problems [Fatigue] : no fatigue [Chest Pain] : no chest pain [Hearing Loss] : no hearing loss [Palpitations] : no palpitations [Shortness Of Breath] : no shortness of breath [Abdominal Pain] : no abdominal pain [Diarrhea] : diarrhea [Dysuria] : no dysuria [Joint Pain] : no joint pain [Skin Rash] : no skin rash [Headache] : no headache

## 2019-09-23 NOTE — ASSESSMENT
[FreeTextEntry1] : 84 years old female with PMH of Depression and Anxiety, HTN, HLD, basal cell CA s/p MOHs, and hypothyroidism who presents for routine follow up with her oldest dtr.\par \par Case discussed w Dr. Paulino \par \par RTC in 2 months

## 2019-09-24 PROCEDURE — 90870 ELECTROCONVULSIVE THERAPY: CPT

## 2019-09-24 RX ORDER — MIDAZOLAM HYDROCHLORIDE 1 MG/ML
1 INJECTION, SOLUTION INTRAMUSCULAR; INTRAVENOUS ONCE
Refills: 0 | Status: DISCONTINUED | OUTPATIENT
Start: 2019-09-24 | End: 2019-09-24

## 2019-09-24 RX ADMIN — MIDAZOLAM HYDROCHLORIDE 1 MILLIGRAM(S): 1 INJECTION, SOLUTION INTRAMUSCULAR; INTRAVENOUS at 11:27

## 2019-09-26 PROCEDURE — 90870 ELECTROCONVULSIVE THERAPY: CPT

## 2019-09-26 RX ORDER — MIDAZOLAM HYDROCHLORIDE 1 MG/ML
1 INJECTION, SOLUTION INTRAMUSCULAR; INTRAVENOUS ONCE
Refills: 0 | Status: DISCONTINUED | OUTPATIENT
Start: 2019-09-26 | End: 2019-09-26

## 2019-09-26 RX ADMIN — MIDAZOLAM HYDROCHLORIDE 1 MILLIGRAM(S): 1 INJECTION, SOLUTION INTRAMUSCULAR; INTRAVENOUS at 12:30

## 2019-09-27 PROCEDURE — 99214 OFFICE O/P EST MOD 30 MIN: CPT

## 2019-10-01 PROCEDURE — 90870 ELECTROCONVULSIVE THERAPY: CPT

## 2019-10-01 RX ORDER — MIDAZOLAM HYDROCHLORIDE 1 MG/ML
1 INJECTION, SOLUTION INTRAMUSCULAR; INTRAVENOUS ONCE
Refills: 0 | Status: DISCONTINUED | OUTPATIENT
Start: 2019-10-01 | End: 2019-10-01

## 2019-10-01 RX ADMIN — MIDAZOLAM HYDROCHLORIDE 1 MILLIGRAM(S): 1 INJECTION, SOLUTION INTRAMUSCULAR; INTRAVENOUS at 11:48

## 2019-10-03 PROCEDURE — 90870 ELECTROCONVULSIVE THERAPY: CPT

## 2019-10-03 RX ORDER — MIDAZOLAM HYDROCHLORIDE 1 MG/ML
1 INJECTION, SOLUTION INTRAMUSCULAR; INTRAVENOUS ONCE
Refills: 0 | Status: DISCONTINUED | OUTPATIENT
Start: 2019-10-03 | End: 2019-10-03

## 2019-10-03 RX ADMIN — MIDAZOLAM HYDROCHLORIDE 1 MILLIGRAM(S): 1 INJECTION, SOLUTION INTRAMUSCULAR; INTRAVENOUS at 12:45

## 2019-10-04 LAB
ANION GAP SERPL CALC-SCNC: 14 MMOL/L
BUN SERPL-MCNC: 23 MG/DL
CALCIUM SERPL-MCNC: 10.2 MG/DL
CHLORIDE SERPL-SCNC: 97 MMOL/L
CO2 SERPL-SCNC: 26 MMOL/L
CREAT SERPL-MCNC: 1.3 MG/DL
GLUCOSE SERPL-MCNC: 99 MG/DL
POTASSIUM SERPL-SCNC: 4.7 MMOL/L
SODIUM SERPL-SCNC: 136 MMOL/L
TSH SERPL-ACNC: 2.98 UIU/ML

## 2019-10-08 PROCEDURE — 90870 ELECTROCONVULSIVE THERAPY: CPT

## 2019-10-08 RX ORDER — MIDAZOLAM HYDROCHLORIDE 1 MG/ML
2 INJECTION, SOLUTION INTRAMUSCULAR; INTRAVENOUS ONCE
Refills: 0 | Status: DISCONTINUED | OUTPATIENT
Start: 2019-10-08 | End: 2019-10-08

## 2019-10-10 PROCEDURE — 90870 ELECTROCONVULSIVE THERAPY: CPT

## 2019-10-15 PROCEDURE — 90870 ELECTROCONVULSIVE THERAPY: CPT

## 2019-10-24 PROCEDURE — 90870 ELECTROCONVULSIVE THERAPY: CPT

## 2019-10-25 DIAGNOSIS — F33.2 MAJOR DEPRESSIVE DISORDER, RECURRENT SEVERE WITHOUT PSYCHOTIC FEATURES: ICD-10-CM

## 2019-10-28 ENCOUNTER — TRANSCRIPTION ENCOUNTER (OUTPATIENT)
Age: 84
End: 2019-10-28

## 2019-10-28 PROCEDURE — 99214 OFFICE O/P EST MOD 30 MIN: CPT

## 2019-10-30 ENCOUNTER — RX RENEWAL (OUTPATIENT)
Age: 84
End: 2019-10-30

## 2019-10-30 PROCEDURE — 90870 ELECTROCONVULSIVE THERAPY: CPT

## 2019-11-07 PROCEDURE — 90870 ELECTROCONVULSIVE THERAPY: CPT

## 2019-11-11 ENCOUNTER — APPOINTMENT (OUTPATIENT)
Dept: GERIATRICS | Facility: CLINIC | Age: 84
End: 2019-11-11
Payer: MEDICARE

## 2019-11-11 ENCOUNTER — NON-APPOINTMENT (OUTPATIENT)
Age: 84
End: 2019-11-11

## 2019-11-11 VITALS
RESPIRATION RATE: 16 BRPM | SYSTOLIC BLOOD PRESSURE: 130 MMHG | HEIGHT: 61 IN | DIASTOLIC BLOOD PRESSURE: 60 MMHG | OXYGEN SATURATION: 96 % | BODY MASS INDEX: 28.05 KG/M2 | TEMPERATURE: 97.9 F | HEART RATE: 59 BPM | WEIGHT: 148.6 LBS

## 2019-11-11 PROCEDURE — 93000 ELECTROCARDIOGRAM COMPLETE: CPT

## 2019-11-11 PROCEDURE — 99214 OFFICE O/P EST MOD 30 MIN: CPT | Mod: 25

## 2019-11-11 NOTE — REVIEW OF SYSTEMS
[Eyesight Problems] : eyesight problems [Incontinence] : incontinence [Loss Of Hearing] : hearing loss [As Noted in HPI] : as noted in HPI [Negative] : Neurological

## 2019-11-13 ENCOUNTER — LABORATORY RESULT (OUTPATIENT)
Age: 84
End: 2019-11-13

## 2019-11-13 LAB
APPEARANCE: CLEAR
BASOPHILS # BLD AUTO: 0.06 K/UL
BASOPHILS NFR BLD AUTO: 0.6 %
BILIRUBIN URINE: NEGATIVE
BLOOD URINE: NEGATIVE
COLOR: YELLOW
EOSINOPHIL # BLD AUTO: 0.11 K/UL
EOSINOPHIL NFR BLD AUTO: 1 %
GLUCOSE QUALITATIVE U: NEGATIVE
HCT VFR BLD CALC: 38.8 %
HGB BLD-MCNC: 12.5 G/DL
IMM GRANULOCYTES NFR BLD AUTO: 0.5 %
KETONES URINE: NEGATIVE
LEUKOCYTE ESTERASE URINE: NEGATIVE
LYMPHOCYTES # BLD AUTO: 1.89 K/UL
LYMPHOCYTES NFR BLD AUTO: 17.6 %
MAN DIFF?: NORMAL
MCHC RBC-ENTMCNC: 28.8 PG
MCHC RBC-ENTMCNC: 32.2 GM/DL
MCV RBC AUTO: 89.4 FL
MONOCYTES # BLD AUTO: 0.78 K/UL
MONOCYTES NFR BLD AUTO: 7.3 %
NEUTROPHILS # BLD AUTO: 7.85 K/UL
NEUTROPHILS NFR BLD AUTO: 73 %
NITRITE URINE: NEGATIVE
PH URINE: 6
PLATELET # BLD AUTO: 304 K/UL
PROTEIN URINE: ABNORMAL
RBC # BLD: 4.34 M/UL
RBC # FLD: 14.1 %
SPECIFIC GRAVITY URINE: 1.02
UROBILINOGEN URINE: NORMAL
WBC # FLD AUTO: 10.74 K/UL

## 2019-11-14 PROCEDURE — 90870 ELECTROCONVULSIVE THERAPY: CPT

## 2019-11-15 NOTE — HISTORY OF PRESENT ILLNESS
[FreeTextEntry1] : 84 years old female with PMH of Depression and Anxiety, HTN, HLD, basal cell CA s/p MOHs, Glaucoma, and hypothyroidism who presents for routine follow up with her dtr.\par She is currently undergoing ECT with Dr. Haq with improvement of depression. She also follows regularly with Dr. Guillen with [facility].  She denies HA, dizziness, chest pain palpitations, abd pain or diarrhea. \par \par Son-In-Law Ihsan ensures medication adherence. \par htn: review of home BP readings mainly 140-150's/80's.\par denies lightheadedness, dizziness, shortness of breath, or palpitations.  adherent with medications\par \par no recent falls\par \par recent uti:  with dysuria on 10/28 s/p abx treatment with resolution of symptoms and denies diarrhea.\par \par breast rash resolved.

## 2019-11-15 NOTE — PHYSICAL EXAM
[General Appearance - Alert] : alert [Sclera] : the sclera and conjunctiva were normal [General Appearance - In No Acute Distress] : in no acute distress [Extraocular Movements] : extraocular movements were intact [No Oral Cyanosis] : no oral cyanosis [Examination Of The Oral Cavity] : the lips and gums were normal [Outer Ear] : the ears and nose were normal in appearance [Neck Appearance] : the appearance of the neck was normal [Respiration, Rhythm And Depth] : normal respiratory rhythm and effort [Exaggerated Use Of Accessory Muscles For Inspiration] : no accessory muscle use [Auscultation Breath Sounds / Voice Sounds] : lungs were clear to auscultation bilaterally [Heart Sounds] : normal S1 and S2 [Heart Rate And Rhythm] : heart rate was normal and rhythm regular [Abdomen Soft] : soft [Bowel Sounds] : normal bowel sounds [Abdomen Tenderness] : non-tender [Nail Clubbing] : no clubbing  or cyanosis of the fingernails [Involuntary Movements] : no involuntary movements were seen [Skin Color & Pigmentation] : normal skin color and pigmentation [] : no rash [No Focal Deficits] : no focal deficits [FreeTextEntry1] : n

## 2019-11-22 PROCEDURE — 99214 OFFICE O/P EST MOD 30 MIN: CPT

## 2019-11-29 ENCOUNTER — OTHER (OUTPATIENT)
Age: 84
End: 2019-11-29

## 2019-12-03 LAB
ANION GAP SERPL CALC-SCNC: 13 MMOL/L
BUN SERPL-MCNC: 25 MG/DL
CALCIUM SERPL-MCNC: 9.9 MG/DL
CHLORIDE SERPL-SCNC: 99 MMOL/L
CO2 SERPL-SCNC: 23 MMOL/L
CREAT SERPL-MCNC: 1.42 MG/DL
GLUCOSE SERPL-MCNC: 106 MG/DL
POTASSIUM SERPL-SCNC: 5 MMOL/L
SODIUM SERPL-SCNC: 135 MMOL/L

## 2019-12-05 PROCEDURE — 90870 ELECTROCONVULSIVE THERAPY: CPT

## 2019-12-05 RX ORDER — FLUMAZENIL 0.1 MG/ML
0.2 VIAL (ML) INTRAVENOUS ONCE
Refills: 0 | Status: COMPLETED | OUTPATIENT
Start: 2019-12-05 | End: 2019-12-05

## 2019-12-05 RX ADMIN — Medication 0.2 MILLIGRAM(S): at 13:09

## 2019-12-13 ENCOUNTER — RX RENEWAL (OUTPATIENT)
Age: 84
End: 2019-12-13

## 2019-12-19 PROCEDURE — 90870 ELECTROCONVULSIVE THERAPY: CPT

## 2019-12-19 RX ORDER — FLUMAZENIL 0.1 MG/ML
0.2 VIAL (ML) INTRAVENOUS ONCE
Refills: 0 | Status: COMPLETED | OUTPATIENT
Start: 2019-12-19 | End: 2019-12-19

## 2019-12-19 RX ADMIN — Medication 0.2 MILLIGRAM(S): at 12:40

## 2019-12-20 PROCEDURE — 99214 OFFICE O/P EST MOD 30 MIN: CPT

## 2020-01-09 PROCEDURE — 90870 ELECTROCONVULSIVE THERAPY: CPT

## 2020-01-09 RX ORDER — FLUMAZENIL 0.1 MG/ML
0.2 VIAL (ML) INTRAVENOUS ONCE
Refills: 0 | Status: COMPLETED | OUTPATIENT
Start: 2020-01-09 | End: 2020-01-09

## 2020-01-09 RX ADMIN — Medication 0.2 MILLIGRAM(S): at 12:25

## 2020-01-23 PROCEDURE — 99214 OFFICE O/P EST MOD 30 MIN: CPT

## 2020-01-30 PROCEDURE — 90870 ELECTROCONVULSIVE THERAPY: CPT

## 2020-01-30 RX ORDER — FLUMAZENIL 0.1 MG/ML
0.2 VIAL (ML) INTRAVENOUS ONCE
Refills: 0 | Status: COMPLETED | OUTPATIENT
Start: 2020-01-30 | End: 2020-01-30

## 2020-01-30 RX ADMIN — Medication 0.2 MILLIGRAM(S): at 11:45

## 2020-02-10 ENCOUNTER — NON-APPOINTMENT (OUTPATIENT)
Age: 85
End: 2020-02-10

## 2020-02-10 ENCOUNTER — APPOINTMENT (OUTPATIENT)
Dept: GERIATRICS | Facility: CLINIC | Age: 85
End: 2020-02-10
Payer: MEDICARE

## 2020-02-10 VITALS
HEART RATE: 63 BPM | DIASTOLIC BLOOD PRESSURE: 62 MMHG | SYSTOLIC BLOOD PRESSURE: 122 MMHG | BODY MASS INDEX: 27.19 KG/M2 | OXYGEN SATURATION: 97 % | RESPIRATION RATE: 16 BRPM | WEIGHT: 144 LBS | TEMPERATURE: 97.7 F | HEIGHT: 61 IN

## 2020-02-10 DIAGNOSIS — Z00.00 ENCOUNTER FOR GENERAL ADULT MEDICAL EXAMINATION W/OUT ABNORMAL FINDINGS: ICD-10-CM
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
## 2020-02-10 DIAGNOSIS — H91.93 UNSPECIFIED HEARING LOSS, BILATERAL: ICD-10-CM

## 2020-02-10 DIAGNOSIS — F32.9 MAJOR DEPRESSIVE DISORDER, SINGLE EPISODE, UNSPECIFIED: ICD-10-CM

## 2020-02-10 LAB
ANION GAP SERPL CALC-SCNC: 15 MMOL/L
BASOPHILS # BLD AUTO: 0.06 K/UL
BASOPHILS NFR BLD AUTO: 0.5 %
BUN SERPL-MCNC: 28 MG/DL
CALCIUM SERPL-MCNC: 9.7 MG/DL
CHLORIDE SERPL-SCNC: 102 MMOL/L
CO2 SERPL-SCNC: 20 MMOL/L
CREAT SERPL-MCNC: 1.66 MG/DL
EOSINOPHIL # BLD AUTO: 0.11 K/UL
EOSINOPHIL NFR BLD AUTO: 0.9 %
GLUCOSE SERPL-MCNC: 135 MG/DL
HCT VFR BLD CALC: 36.2 %
HGB BLD-MCNC: 12.1 G/DL
IMM GRANULOCYTES NFR BLD AUTO: 0.8 %
LYMPHOCYTES # BLD AUTO: 1.42 K/UL
LYMPHOCYTES NFR BLD AUTO: 12.1 %
MAN DIFF?: NORMAL
MCHC RBC-ENTMCNC: 30.6 PG
MCHC RBC-ENTMCNC: 33.4 GM/DL
MCV RBC AUTO: 91.6 FL
MONOCYTES # BLD AUTO: 0.81 K/UL
MONOCYTES NFR BLD AUTO: 6.9 %
NEUTROPHILS # BLD AUTO: 9.26 K/UL
NEUTROPHILS NFR BLD AUTO: 78.8 %
PLATELET # BLD AUTO: 294 K/UL
POTASSIUM SERPL-SCNC: 4.5 MMOL/L
RBC # BLD: 3.95 M/UL
RBC # FLD: 12.8 %
SODIUM SERPL-SCNC: 137 MMOL/L
WBC # FLD AUTO: 11.75 K/UL

## 2020-02-10 PROCEDURE — 93000 ELECTROCARDIOGRAM COMPLETE: CPT | Mod: GC

## 2020-02-10 PROCEDURE — 99214 OFFICE O/P EST MOD 30 MIN: CPT | Mod: 25

## 2020-02-10 NOTE — REVIEW OF SYSTEMS
[Loss Of Hearing] : hearing loss [Depression] : depression [Confused] : confusion [Fever] : no fever [Chills] : no chills [Feeling Poorly] : not feeling poorly [Feeling Tired] : not feeling tired [Eye Pain] : no eye pain [Red Eyes] : eyes not red [Eyesight Problems] : no eyesight problems [Earache] : no earache [Nosebleeds] : no nosebleeds [Nasal Discharge] : no nasal discharge [Heart Rate Is Slow] : the heart rate was not slow [Heart Rate Is Fast] : the heart rate was not fast [Palpitations] : no palpitations [Chest Pain] : no chest pain [Leg Claudication] : no intermittent leg claudication [Wheezing] : no wheezing [Shortness Of Breath] : no shortness of breath [Cough] : no cough [Abdominal Pain] : no abdominal pain [SOB on Exertion] : no shortness of breath during exertion [Vomiting] : no vomiting [Constipation] : no constipation [Diarrhea] : no diarrhea [Dysuria] : no dysuria [Skin Lesions] : no skin lesions [Skin Wound] : no skin wound [Itching] : no itching [de-identified] : memory changes

## 2020-02-10 NOTE — PHYSICAL EXAM
[General Appearance - Alert] : alert [General Appearance - In No Acute Distress] : in no acute distress [General Appearance - Well Developed] : well developed [General Appearance - Well Nourished] : well nourished [General Appearance - Well-Appearing] : healthy appearing [Extraocular Movements] : extraocular movements were intact [Sclera] : the sclera and conjunctiva were normal [Normal Oral Mucosa] : normal oral mucosa [Outer Ear] : the ears and nose were normal in appearance [Neck Appearance] : the appearance of the neck was normal [Both Tympanic Membranes Were Examined] : both tympanic membranes were normal [Neck Cervical Mass (___cm)] : no neck mass was observed [Jugular Venous Distention Increased] : there was no jugular-venous distention [Respiration, Rhythm And Depth] : normal respiratory rhythm and effort [Exaggerated Use Of Accessory Muscles For Inspiration] : no accessory muscle use [Auscultation Breath Sounds / Voice Sounds] : lungs were clear to auscultation bilaterally [Apical Impulse] : the apical impulse was normal [Heart Sounds Gallop] : no gallops [Heart Rate And Rhythm] : heart rate was normal and rhythm regular [Heart Sounds] : normal S1 and S2 [Bowel Sounds] : normal bowel sounds [Arterial Pulses Carotid] : carotid pulses were normal with no bruits [Abdomen Soft] : soft [Abdomen Tenderness] : non-tender [] : no hepato-splenomegaly [Abnormal Walk] : normal gait [Abdomen Mass (___ Cm)] : no abdominal mass palpated [No CVA Tenderness] : no ~M costovertebral angle tenderness [Nail Clubbing] : no clubbing  or cyanosis of the fingernails [Musculoskeletal - Swelling] : no joint swelling seen [Motor Tone] : muscle strength and tone were normal [Impaired Insight] : insight and judgment were intact [Skin Color & Pigmentation] : normal skin color and pigmentation [Affect] : the affect was normal [FreeTextEntry1] : with episodes of confusion, requires frequent redirection (specially short term memory)

## 2020-02-10 NOTE — ASSESSMENT
[FreeTextEntry1] : - Ordered repeat blood work prior to next session of ECT\par \par -Leukocytosis: chronic wes elevated ~11, no evidence of infection, continue to monitor at this time.\par \par CKD3-  Cr slightly increased, from 1.42 to 1.66- with normal electrolytes. plan to continue to monitor at this time. renally dose medications, and avoid nephrotoxins.\par \par - f/u in 3 months

## 2020-02-10 NOTE — HISTORY OF PRESENT ILLNESS
[FreeTextEntry1] : 84 years old female with PMH of Depression and Anxiety, HTN, HLD, basal cell CA s/p MOHs, Glaucoma, and hypothyroidism who presents for routine follow up with her dtr.\par \par She is currently undergoing ECT with Dr. Haq with improvement of depression. She also follows regularly with Dr. Guillen with [facility]. She denies PAZ, dizziness, chest pain palpitations, abdominal pain or diarrhea. \par \par Son-In-Law Ihsan ensures medication adherence. \par HTN: review of home BP readings mainly 130/110-/80's  after adding Lisinopril on last visit .\par \par Had 1 episode of a fall at the living room, she felt trying to reach the coffee table from the couch. Denies any head injuries or pain from fall. \par \par Breast rash resolved.

## 2020-02-19 ENCOUNTER — RX RENEWAL (OUTPATIENT)
Age: 85
End: 2020-02-19

## 2020-02-20 PROCEDURE — 99214 OFFICE O/P EST MOD 30 MIN: CPT | Mod: 25

## 2020-02-20 PROCEDURE — 90870 ELECTROCONVULSIVE THERAPY: CPT

## 2020-02-20 RX ORDER — FLUMAZENIL 0.1 MG/ML
0.2 VIAL (ML) INTRAVENOUS ONCE
Refills: 0 | Status: COMPLETED | OUTPATIENT
Start: 2020-02-20 | End: 2020-02-20

## 2020-02-20 RX ADMIN — Medication 0.2 MILLIGRAM(S): at 11:04

## 2020-03-20 PROCEDURE — 90870 ELECTROCONVULSIVE THERAPY: CPT

## 2020-03-20 RX ORDER — FLUMAZENIL 0.1 MG/ML
0.2 VIAL (ML) INTRAVENOUS ONCE
Refills: 0 | Status: COMPLETED | OUTPATIENT
Start: 2020-03-20 | End: 2020-03-20

## 2020-03-20 RX ADMIN — Medication 0.2 MILLIGRAM(S): at 08:54

## 2020-04-20 PROCEDURE — 99214 OFFICE O/P EST MOD 30 MIN: CPT

## 2020-04-24 ENCOUNTER — RX RENEWAL (OUTPATIENT)
Age: 85
End: 2020-04-24

## 2020-04-28 ENCOUNTER — RX RENEWAL (OUTPATIENT)
Age: 85
End: 2020-04-28

## 2020-05-08 RX ORDER — FLUOXETINE HYDROCHLORIDE 10 MG/1
10 CAPSULE ORAL DAILY
Refills: 0 | Status: DISCONTINUED | COMMUNITY
Start: 2019-08-26 | End: 2020-05-08

## 2020-05-11 ENCOUNTER — APPOINTMENT (OUTPATIENT)
Dept: GERIATRICS | Facility: CLINIC | Age: 85
End: 2020-05-11
Payer: MEDICARE

## 2020-05-11 DIAGNOSIS — R41.3 OTHER AMNESIA: ICD-10-CM

## 2020-05-11 PROCEDURE — 99214 OFFICE O/P EST MOD 30 MIN: CPT | Mod: 95

## 2020-05-11 NOTE — ASSESSMENT
[FreeTextEntry1] : Leukocytosis: chronic mildly elevated ~11, no evidence of infection, continue to monitor at this time.\par \par CKD3- baseline ~1.5- continue to monitor at this time. renally dose medications, and avoid nephrotoxins.\par

## 2020-05-11 NOTE — HISTORY OF PRESENT ILLNESS
[Medical Office: ([facility])___] : at the medical office located in  [Home] : at home, [unfilled] , at the time of the visit. [Patient] : the patient [Family Member] : family member [Self] : self [FreeTextEntry1] : 84 years old female with PMH of Depression and Anxiety, HTN, HLD, basal cell CA s/p MOHs, Glaucoma, and hypothyroidism who presents for follow up for htn.\par \par She has not had any recent ECT with Dr. Haq, Ihsan reports she is a bit more "weepy" but remains overall improved in her depression from prior to starting ECT. She also follows regularly with Dr. Guillen with [facility] doing telehealth visits. \par Son-In-Law Ihsan ensures medication adherence. \par HTN: review of home BP readings controlled (120-130's sbp) after adding lisinpril in end of 2019. \par \par sleeping well\par She denies HA, dizziness, chest pain palpitations, abdominal pain or diarrhea. no new cough, fever, or sob.\par \par sometimes nausea, but no vomiting\par eating well\par \par no recent falls.\par \par someone is always home with them.\par

## 2020-05-11 NOTE — PHYSICAL EXAM
[General Appearance - Alert] : alert [General Appearance - In No Acute Distress] : in no acute distress [General Appearance - Well Nourished] : well nourished [Extraocular Movements] : extraocular movements were intact [Sclera] : the sclera and conjunctiva were normal [] : no respiratory distress [Exaggerated Use Of Accessory Muscles For Inspiration] : no accessory muscle use [Respiration, Rhythm And Depth] : normal respiratory rhythm and effort [Skin Color & Pigmentation] : normal skin color and pigmentation [Edema] : there was no peripheral edema [Mood] : the mood was normal [Affect] : the affect was normal [FreeTextEntry1] : poor hearing

## 2020-05-18 PROCEDURE — 99442: CPT | Mod: 95

## 2020-06-05 ENCOUNTER — NON-APPOINTMENT (OUTPATIENT)
Age: 85
End: 2020-06-05

## 2020-06-05 ENCOUNTER — APPOINTMENT (OUTPATIENT)
Dept: GERIATRICS | Facility: CLINIC | Age: 85
End: 2020-06-05
Payer: MEDICARE

## 2020-06-05 VITALS
OXYGEN SATURATION: 96 % | WEIGHT: 142.2 LBS | HEIGHT: 61 IN | DIASTOLIC BLOOD PRESSURE: 60 MMHG | BODY MASS INDEX: 26.85 KG/M2 | TEMPERATURE: 98.2 F | HEART RATE: 63 BPM | SYSTOLIC BLOOD PRESSURE: 140 MMHG | RESPIRATION RATE: 16 BRPM

## 2020-06-05 LAB
BASOPHILS # BLD AUTO: 0.08 K/UL
BASOPHILS NFR BLD AUTO: 0.7 %
EOSINOPHIL # BLD AUTO: 0.06 K/UL
EOSINOPHIL NFR BLD AUTO: 0.5 %
HCT VFR BLD CALC: 36.7 %
HGB BLD-MCNC: 12.1 G/DL
IMM GRANULOCYTES NFR BLD AUTO: 0.5 %
LYMPHOCYTES # BLD AUTO: 1.5 K/UL
LYMPHOCYTES NFR BLD AUTO: 13.1 %
MAN DIFF?: NORMAL
MCHC RBC-ENTMCNC: 30.3 PG
MCHC RBC-ENTMCNC: 33 GM/DL
MCV RBC AUTO: 91.8 FL
MONOCYTES # BLD AUTO: 0.76 K/UL
MONOCYTES NFR BLD AUTO: 6.6 %
NEUTROPHILS # BLD AUTO: 8.97 K/UL
NEUTROPHILS NFR BLD AUTO: 78.6 %
PLATELET # BLD AUTO: 296 K/UL
RBC # BLD: 4 M/UL
RBC # FLD: 12.7 %
WBC # FLD AUTO: 11.43 K/UL

## 2020-06-05 PROCEDURE — 93000 ELECTROCARDIOGRAM COMPLETE: CPT | Mod: GC

## 2020-06-05 PROCEDURE — 99214 OFFICE O/P EST MOD 30 MIN: CPT | Mod: GC,25

## 2020-06-08 LAB
ALBUMIN SERPL ELPH-MCNC: 4.6 G/DL
ALP BLD-CCNC: 50 U/L
ALT SERPL-CCNC: 8 U/L
ANION GAP SERPL CALC-SCNC: 16 MMOL/L
AST SERPL-CCNC: 13 U/L
BILIRUB SERPL-MCNC: 0.3 MG/DL
BUN SERPL-MCNC: 26 MG/DL
CALCIUM SERPL-MCNC: 10 MG/DL
CHLORIDE SERPL-SCNC: 99 MMOL/L
CO2 SERPL-SCNC: 20 MMOL/L
CREAT SERPL-MCNC: 1.44 MG/DL
FOLATE SERPL-MCNC: 7.1 NG/ML
GLUCOSE SERPL-MCNC: 124 MG/DL
POTASSIUM SERPL-SCNC: 4.5 MMOL/L
PROT SERPL-MCNC: 6.6 G/DL
SODIUM SERPL-SCNC: 135 MMOL/L
TSH SERPL-ACNC: 0.72 UIU/ML
VIT B12 SERPL-MCNC: 567 PG/ML

## 2020-06-08 NOTE — PHYSICAL EXAM
[General Appearance - In No Acute Distress] : in no acute distress [General Appearance - Alert] : alert [Sclera] : the sclera and conjunctiva were normal [PERRL With Normal Accommodation] : pupils were equal in size, round, and reactive to light [Extraocular Movements] : extraocular movements were intact [No Oral Pallor] : no oral pallor [Normal Oral Mucosa] : normal oral mucosa [No Oral Cyanosis] : no oral cyanosis [Outer Ear] : the ears and nose were normal in appearance [Neck Appearance] : the appearance of the neck was normal [Oropharynx] : The oropharynx was normal [Jugular Venous Distention Increased] : there was no jugular-venous distention [Neck Cervical Mass (___cm)] : no neck mass was observed [Thyroid Nodule] : there were no palpable thyroid nodules [Thyroid Diffuse Enlargement] : the thyroid was not enlarged [Auscultation Breath Sounds / Voice Sounds] : lungs were clear to auscultation bilaterally [Heart Rate And Rhythm] : heart rate was normal and rhythm regular [Heart Sounds] : normal S1 and S2 [Heart Sounds Gallop] : no gallops [Heart Sounds Pericardial Friction Rub] : no pericardial rub [Edema] : there was no peripheral edema [Full Pulse] : the pedal pulses are present [Bowel Sounds] : normal bowel sounds [Abdomen Tenderness] : non-tender [Abdomen Soft] : soft [] : no hepato-splenomegaly [Abdomen Mass (___ Cm)] : no abdominal mass palpated [Abnormal Walk] : normal gait [Musculoskeletal - Swelling] : no joint swelling seen [Nail Clubbing] : no clubbing  or cyanosis of the fingernails [Motor Tone] : muscle strength and tone were normal [Sensation] : the sensory exam was normal to light touch and pinprick [Deep Tendon Reflexes (DTR)] : deep tendon reflexes were 2+ and symmetric [No Focal Deficits] : no focal deficits [Impaired Insight] : insight and judgment were intact [Affect] : the affect was normal [FreeTextEntry1] : + Systolic murmur

## 2020-06-08 NOTE — HISTORY OF PRESENT ILLNESS
[FreeTextEntry1] : 83yo female with hx of Depression with mild cognitive impairment, accompanied by her son-in-law, presented to the clinic for routine follow up as well as medical clearance for ECT. Pt is followed by psychiatry Dr. Fortunato Guillen. She has been having ECT treatments for the past year, on a tapered treatment regimen, initially 2 times a week then once a week and now once a month with her last treatment in March 2020. Unable to have additional treatments due to the COVID-19 pandemic. Family has noted pt become more anxious and depressed. She constantly complains of nausea without vomiting or change in appetite. \par Pt is to be scheduled for ECT in the near future.\par No adverse reaction to anesthesia from prior ECT.\par Pt is relatively independent with ADLs. She cooks, cleans, baths, uses the bathroom independantly. Lives at home with her daughter, son-in-law and 2 twin granddaughters who are 10years old. Daughter works as a  and is working night shift during the protest crisis right now. Her son-in-law is retired and is at home with her.

## 2020-06-11 PROCEDURE — 90870 ELECTROCONVULSIVE THERAPY: CPT

## 2020-06-11 RX ORDER — FLUMAZENIL 0.1 MG/ML
0.2 VIAL (ML) INTRAVENOUS ONCE
Refills: 0 | Status: COMPLETED | OUTPATIENT
Start: 2020-06-11 | End: 2020-06-11

## 2020-06-11 RX ORDER — MIDAZOLAM HYDROCHLORIDE 1 MG/ML
2 INJECTION, SOLUTION INTRAMUSCULAR; INTRAVENOUS ONCE
Refills: 0 | Status: DISCONTINUED | OUTPATIENT
Start: 2020-06-11 | End: 2020-06-11

## 2020-06-11 RX ADMIN — Medication 0.2 MILLIGRAM(S): at 11:23

## 2020-06-11 RX ADMIN — MIDAZOLAM HYDROCHLORIDE 2 MILLIGRAM(S): 1 INJECTION, SOLUTION INTRAMUSCULAR; INTRAVENOUS at 11:30

## 2020-06-15 PROCEDURE — 99442: CPT | Mod: 95

## 2020-06-18 PROCEDURE — 90870 ELECTROCONVULSIVE THERAPY: CPT

## 2020-06-18 RX ORDER — FLUMAZENIL 0.1 MG/ML
0.2 VIAL (ML) INTRAVENOUS ONCE
Refills: 0 | Status: COMPLETED | OUTPATIENT
Start: 2020-06-18 | End: 2020-06-18

## 2020-06-18 RX ADMIN — Medication 0.2 MILLIGRAM(S): at 10:47

## 2020-06-24 LAB — SARS-COV-2 RNA SPEC QL NAA+PROBE: SIGNIFICANT CHANGE UP

## 2020-06-25 PROCEDURE — 90870 ELECTROCONVULSIVE THERAPY: CPT

## 2020-06-25 RX ORDER — MIDAZOLAM HYDROCHLORIDE 1 MG/ML
2 INJECTION, SOLUTION INTRAMUSCULAR; INTRAVENOUS ONCE
Refills: 0 | Status: DISCONTINUED | OUTPATIENT
Start: 2020-06-25 | End: 2020-06-25

## 2020-06-25 RX ORDER — FLUMAZENIL 0.1 MG/ML
0.2 VIAL (ML) INTRAVENOUS ONCE
Refills: 0 | Status: COMPLETED | OUTPATIENT
Start: 2020-06-25 | End: 2020-06-25

## 2020-06-25 RX ADMIN — MIDAZOLAM HYDROCHLORIDE 2 MILLIGRAM(S): 1 INJECTION, SOLUTION INTRAMUSCULAR; INTRAVENOUS at 11:30

## 2020-06-25 RX ADMIN — Medication 0.2 MILLIGRAM(S): at 11:26

## 2020-07-02 PROCEDURE — 90870 ELECTROCONVULSIVE THERAPY: CPT

## 2020-07-02 RX ORDER — MIDAZOLAM HYDROCHLORIDE 1 MG/ML
2 INJECTION, SOLUTION INTRAMUSCULAR; INTRAVENOUS ONCE
Refills: 0 | Status: DISCONTINUED | OUTPATIENT
Start: 2020-07-02 | End: 2020-07-02

## 2020-07-02 RX ORDER — FLUMAZENIL 0.1 MG/ML
0.2 VIAL (ML) INTRAVENOUS ONCE
Refills: 0 | Status: COMPLETED | OUTPATIENT
Start: 2020-07-02 | End: 2020-07-02

## 2020-07-02 RX ADMIN — MIDAZOLAM HYDROCHLORIDE 2 MILLIGRAM(S): 1 INJECTION, SOLUTION INTRAMUSCULAR; INTRAVENOUS at 10:03

## 2020-07-02 RX ADMIN — Medication 0.2 MILLIGRAM(S): at 10:00

## 2020-07-13 LAB — SARS-COV-2 RNA SPEC QL NAA+PROBE: SIGNIFICANT CHANGE UP

## 2020-07-13 PROCEDURE — 99443: CPT | Mod: 95

## 2020-07-15 PROCEDURE — 90870 ELECTROCONVULSIVE THERAPY: CPT

## 2020-07-15 RX ORDER — FLUMAZENIL 0.1 MG/ML
0.2 VIAL (ML) INTRAVENOUS ONCE
Refills: 0 | Status: COMPLETED | OUTPATIENT
Start: 2020-07-15 | End: 2020-07-15

## 2020-07-15 RX ORDER — MIDAZOLAM HYDROCHLORIDE 1 MG/ML
2 INJECTION, SOLUTION INTRAMUSCULAR; INTRAVENOUS ONCE
Refills: 0 | Status: DISCONTINUED | OUTPATIENT
Start: 2020-07-15 | End: 2020-07-15

## 2020-07-15 RX ADMIN — Medication 0.2 MILLIGRAM(S): at 09:29

## 2020-07-15 RX ADMIN — MIDAZOLAM HYDROCHLORIDE 2 MILLIGRAM(S): 1 INJECTION, SOLUTION INTRAMUSCULAR; INTRAVENOUS at 09:33

## 2020-07-29 LAB — SARS-COV-2 RNA SPEC QL NAA+PROBE: SIGNIFICANT CHANGE UP

## 2020-07-31 PROCEDURE — 90870 ELECTROCONVULSIVE THERAPY: CPT

## 2020-07-31 RX ORDER — FLUMAZENIL 0.1 MG/ML
0.2 VIAL (ML) INTRAVENOUS ONCE
Refills: 0 | Status: COMPLETED | OUTPATIENT
Start: 2020-07-31 | End: 2020-07-31

## 2020-07-31 RX ORDER — MIDAZOLAM HYDROCHLORIDE 1 MG/ML
2 INJECTION, SOLUTION INTRAMUSCULAR; INTRAVENOUS ONCE
Refills: 0 | Status: DISCONTINUED | OUTPATIENT
Start: 2020-07-31 | End: 2020-07-31

## 2020-07-31 RX ADMIN — Medication 0.2 MILLIGRAM(S): at 09:59

## 2020-07-31 RX ADMIN — MIDAZOLAM HYDROCHLORIDE 2 MILLIGRAM(S): 1 INJECTION, SOLUTION INTRAMUSCULAR; INTRAVENOUS at 10:00

## 2020-08-10 PROCEDURE — 99442: CPT | Mod: 95

## 2020-08-17 LAB — SARS-COV-2 RNA SPEC QL NAA+PROBE: SIGNIFICANT CHANGE UP

## 2020-08-20 PROCEDURE — 90870 ELECTROCONVULSIVE THERAPY: CPT

## 2020-08-20 RX ORDER — MIDAZOLAM HYDROCHLORIDE 1 MG/ML
2 INJECTION, SOLUTION INTRAMUSCULAR; INTRAVENOUS ONCE
Refills: 0 | Status: DISCONTINUED | OUTPATIENT
Start: 2020-08-20 | End: 2020-08-20

## 2020-08-20 RX ORDER — FLUMAZENIL 0.1 MG/ML
0.2 VIAL (ML) INTRAVENOUS ONCE
Refills: 0 | Status: COMPLETED | OUTPATIENT
Start: 2020-08-20 | End: 2020-08-20

## 2020-08-20 RX ADMIN — MIDAZOLAM HYDROCHLORIDE 2 MILLIGRAM(S): 1 INJECTION, SOLUTION INTRAMUSCULAR; INTRAVENOUS at 11:06

## 2020-08-20 RX ADMIN — Medication 0.2 MILLIGRAM(S): at 11:02

## 2020-08-30 ENCOUNTER — RESULT CHARGE (OUTPATIENT)
Age: 85
End: 2020-08-30

## 2020-08-31 ENCOUNTER — APPOINTMENT (OUTPATIENT)
Dept: GERIATRICS | Facility: CLINIC | Age: 85
End: 2020-08-31
Payer: MEDICARE

## 2020-08-31 ENCOUNTER — NON-APPOINTMENT (OUTPATIENT)
Age: 85
End: 2020-08-31

## 2020-08-31 VITALS
HEIGHT: 61 IN | OXYGEN SATURATION: 97 % | SYSTOLIC BLOOD PRESSURE: 138 MMHG | RESPIRATION RATE: 16 BRPM | WEIGHT: 133.8 LBS | BODY MASS INDEX: 25.26 KG/M2 | TEMPERATURE: 97.3 F | DIASTOLIC BLOOD PRESSURE: 60 MMHG | HEART RATE: 76 BPM

## 2020-08-31 PROCEDURE — 99214 OFFICE O/P EST MOD 30 MIN: CPT

## 2020-09-02 LAB
ANION GAP SERPL CALC-SCNC: 14 MMOL/L
BASOPHILS # BLD AUTO: 0.06 K/UL
BASOPHILS NFR BLD AUTO: 0.4 %
BUN SERPL-MCNC: 22 MG/DL
CALCIUM SERPL-MCNC: 10.2 MG/DL
CHLORIDE SERPL-SCNC: 97 MMOL/L
CO2 SERPL-SCNC: 21 MMOL/L
CREAT SERPL-MCNC: 1.66 MG/DL
EOSINOPHIL # BLD AUTO: 0.04 K/UL
EOSINOPHIL NFR BLD AUTO: 0.3 %
GLUCOSE SERPL-MCNC: 156 MG/DL
HCT VFR BLD CALC: 37.2 %
HGB BLD-MCNC: 12.3 G/DL
IMM GRANULOCYTES NFR BLD AUTO: 0.9 %
LYMPHOCYTES # BLD AUTO: 1.17 K/UL
LYMPHOCYTES NFR BLD AUTO: 8 %
MAN DIFF?: NORMAL
MCHC RBC-ENTMCNC: 30.2 PG
MCHC RBC-ENTMCNC: 33.1 GM/DL
MCV RBC AUTO: 91.4 FL
MONOCYTES # BLD AUTO: 0.82 K/UL
MONOCYTES NFR BLD AUTO: 5.6 %
NEUTROPHILS # BLD AUTO: 12.4 K/UL
NEUTROPHILS NFR BLD AUTO: 84.8 %
PLATELET # BLD AUTO: 312 K/UL
POTASSIUM SERPL-SCNC: 4.6 MMOL/L
RBC # BLD: 4.07 M/UL
RBC # FLD: 12.8 %
SODIUM SERPL-SCNC: 132 MMOL/L
WBC # FLD AUTO: 14.62 K/UL

## 2020-09-02 NOTE — HISTORY OF PRESENT ILLNESS
[FreeTextEntry1] : 85yo female with hx of Anxiety and Depression, CKD, cognitive impairment, accompanied by her son-in-law,presents for medical clearance for ECT. Pt is followed by psychiatry Dr. Fortunato Guillen. \par has schedule COVID testing day prior to ECT at ECT location.\par Family noticied improvement in mood after ECT.  She occasionally complains of nausea without vomiting or change in appetite. DARIELA reports prefers softer foods.  eating protein (egg, lunch meat, milk)\par \par Pt is relatively independent with ADLs. She cooks, cleans, baths, uses the bathroom independently. Lives at home with her daughter, son-in-law and 2 twin granddaughters who are 10years old.

## 2020-09-02 NOTE — REVIEW OF SYSTEMS
[Negative] : Heme/Lymph [Abdominal Pain] : no abdominal pain [Vomiting] : no vomiting [Constipation] : no constipation [Diarrhea] : no diarrhea

## 2020-09-02 NOTE — PHYSICAL EXAM
[General Appearance - In No Acute Distress] : in no acute distress [General Appearance - Alert] : alert [Sclera] : the sclera and conjunctiva were normal [Extraocular Movements] : extraocular movements were intact [Normal Oral Mucosa] : normal oral mucosa [No Oral Pallor] : no oral pallor [No Oral Cyanosis] : no oral cyanosis [Outer Ear] : the ears and nose were normal in appearance [Oropharynx] : The oropharynx was normal [Neck Appearance] : the appearance of the neck was normal [Respiration, Rhythm And Depth] : normal respiratory rhythm and effort [Exaggerated Use Of Accessory Muscles For Inspiration] : no accessory muscle use [Auscultation Breath Sounds / Voice Sounds] : lungs were clear to auscultation bilaterally [Heart Rate And Rhythm] : heart rate was normal and rhythm regular [Heart Sounds] : normal S1 and S2 [Heart Sounds Gallop] : no gallops [Heart Sounds Pericardial Friction Rub] : no pericardial rub [Full Pulse] : the pedal pulses are present [Edema] : there was no peripheral edema [Abdomen Soft] : soft [Abdomen Tenderness] : non-tender [] : no hepato-splenomegaly [Abdomen Mass (___ Cm)] : no abdominal mass palpated [Abnormal Walk] : normal gait [Nail Clubbing] : no clubbing  or cyanosis of the fingernails [Involuntary Movements] : no involuntary movements were seen [Motor Tone] : muscle strength and tone were normal [Musculoskeletal - Swelling] : no joint swelling seen [Skin Color & Pigmentation] : normal skin color and pigmentation [Sensation] : the sensory exam was normal to light touch and pinprick [No Focal Deficits] : no focal deficits [Motor Exam] : the motor exam was normal [FreeTextEntry1] : anxious

## 2020-09-08 LAB — SARS-COV-2 RNA SPEC QL NAA+PROBE: SIGNIFICANT CHANGE UP

## 2020-09-10 PROCEDURE — 90870 ELECTROCONVULSIVE THERAPY: CPT

## 2020-09-10 RX ORDER — FLUMAZENIL 0.1 MG/ML
0.2 VIAL (ML) INTRAVENOUS ONCE
Refills: 0 | Status: COMPLETED | OUTPATIENT
Start: 2020-09-10 | End: 2020-09-10

## 2020-09-10 RX ORDER — MIDAZOLAM HYDROCHLORIDE 1 MG/ML
2 INJECTION, SOLUTION INTRAMUSCULAR; INTRAVENOUS ONCE
Refills: 0 | Status: DISCONTINUED | OUTPATIENT
Start: 2020-09-10 | End: 2020-09-10

## 2020-09-10 RX ADMIN — MIDAZOLAM HYDROCHLORIDE 2 MILLIGRAM(S): 1 INJECTION, SOLUTION INTRAMUSCULAR; INTRAVENOUS at 09:03

## 2020-09-10 RX ADMIN — Medication 0.2 MILLIGRAM(S): at 08:59

## 2020-09-14 PROCEDURE — 99214 OFFICE O/P EST MOD 30 MIN: CPT

## 2020-09-23 ENCOUNTER — RX RENEWAL (OUTPATIENT)
Age: 85
End: 2020-09-23

## 2020-10-05 LAB — SARS-COV-2 RNA SPEC QL NAA+PROBE: SIGNIFICANT CHANGE UP

## 2020-10-06 PROCEDURE — 90870 ELECTROCONVULSIVE THERAPY: CPT

## 2020-10-06 RX ORDER — FLUMAZENIL 0.1 MG/ML
0.2 VIAL (ML) INTRAVENOUS ONCE
Refills: 0 | Status: COMPLETED | OUTPATIENT
Start: 2020-10-06 | End: 2020-10-06

## 2020-10-06 RX ORDER — MIDAZOLAM HYDROCHLORIDE 1 MG/ML
2 INJECTION, SOLUTION INTRAMUSCULAR; INTRAVENOUS ONCE
Refills: 0 | Status: DISCONTINUED | OUTPATIENT
Start: 2020-10-06 | End: 2020-10-06

## 2020-10-06 RX ADMIN — MIDAZOLAM HYDROCHLORIDE 2 MILLIGRAM(S): 1 INJECTION, SOLUTION INTRAMUSCULAR; INTRAVENOUS at 11:21

## 2020-10-06 RX ADMIN — Medication 0.2 MILLIGRAM(S): at 11:17

## 2020-10-13 PROCEDURE — 99442: CPT | Mod: 95

## 2020-10-14 ENCOUNTER — TRANSCRIPTION ENCOUNTER (OUTPATIENT)
Age: 85
End: 2020-10-14

## 2020-10-16 ENCOUNTER — RX RENEWAL (OUTPATIENT)
Age: 85
End: 2020-10-16

## 2020-11-02 LAB — SARS-COV-2 RNA SPEC QL NAA+PROBE: SIGNIFICANT CHANGE UP

## 2020-11-03 PROCEDURE — 90870 ELECTROCONVULSIVE THERAPY: CPT

## 2020-11-03 RX ORDER — MIDAZOLAM HYDROCHLORIDE 1 MG/ML
2 INJECTION, SOLUTION INTRAMUSCULAR; INTRAVENOUS ONCE
Refills: 0 | Status: DISCONTINUED | OUTPATIENT
Start: 2020-11-03 | End: 2020-11-03

## 2020-11-03 RX ORDER — FLUMAZENIL 0.1 MG/ML
0.2 VIAL (ML) INTRAVENOUS ONCE
Refills: 0 | Status: COMPLETED | OUTPATIENT
Start: 2020-11-03 | End: 2020-11-03

## 2020-11-03 RX ADMIN — MIDAZOLAM HYDROCHLORIDE 2 MILLIGRAM(S): 1 INJECTION, SOLUTION INTRAMUSCULAR; INTRAVENOUS at 11:02

## 2020-11-03 RX ADMIN — Medication 0.2 MILLIGRAM(S): at 10:58

## 2020-11-09 PROCEDURE — 99442: CPT | Mod: 95

## 2020-11-20 ENCOUNTER — APPOINTMENT (OUTPATIENT)
Dept: GERIATRICS | Facility: CLINIC | Age: 85
End: 2020-11-20
Payer: MEDICARE

## 2020-11-20 ENCOUNTER — NON-APPOINTMENT (OUTPATIENT)
Age: 85
End: 2020-11-20

## 2020-11-20 VITALS
WEIGHT: 138.38 LBS | TEMPERATURE: 96.5 F | HEART RATE: 65 BPM | DIASTOLIC BLOOD PRESSURE: 50 MMHG | OXYGEN SATURATION: 97 % | RESPIRATION RATE: 16 BRPM | BODY MASS INDEX: 26.13 KG/M2 | SYSTOLIC BLOOD PRESSURE: 118 MMHG | HEIGHT: 61 IN

## 2020-11-20 LAB
ALBUMIN SERPL ELPH-MCNC: 4.4 G/DL
ALP BLD-CCNC: 49 U/L
ALT SERPL-CCNC: 7 U/L
ANION GAP SERPL CALC-SCNC: 10 MMOL/L
AST SERPL-CCNC: 12 U/L
BASOPHILS # BLD AUTO: 0.08 K/UL
BASOPHILS NFR BLD AUTO: 0.7 %
BILIRUB SERPL-MCNC: 0.3 MG/DL
BUN SERPL-MCNC: 26 MG/DL
CALCIUM SERPL-MCNC: 9.7 MG/DL
CHLORIDE SERPL-SCNC: 100 MMOL/L
CO2 SERPL-SCNC: 24 MMOL/L
CREAT SERPL-MCNC: 1.45 MG/DL
EOSINOPHIL # BLD AUTO: 0.04 K/UL
EOSINOPHIL NFR BLD AUTO: 0.4 %
GLUCOSE SERPL-MCNC: 169 MG/DL
HCT VFR BLD CALC: 36.4 %
HGB BLD-MCNC: 11.5 G/DL
IMM GRANULOCYTES NFR BLD AUTO: 0.8 %
LDLC SERPL DIRECT ASSAY-MCNC: 87 MG/DL
LYMPHOCYTES # BLD AUTO: 1.05 K/UL
LYMPHOCYTES NFR BLD AUTO: 9.7 %
MAN DIFF?: NORMAL
MCHC RBC-ENTMCNC: 30.1 PG
MCHC RBC-ENTMCNC: 31.6 GM/DL
MCV RBC AUTO: 95.3 FL
MONOCYTES # BLD AUTO: 0.58 K/UL
MONOCYTES NFR BLD AUTO: 5.3 %
NEUTROPHILS # BLD AUTO: 9.03 K/UL
NEUTROPHILS NFR BLD AUTO: 83.1 %
PLATELET # BLD AUTO: 302 K/UL
POTASSIUM SERPL-SCNC: 4.6 MMOL/L
PROT SERPL-MCNC: 6.5 G/DL
RBC # BLD: 3.82 M/UL
RBC # FLD: 13.2 %
SODIUM SERPL-SCNC: 135 MMOL/L
TSH SERPL-ACNC: 1.48 UIU/ML
WBC # FLD AUTO: 10.87 K/UL

## 2020-11-20 PROCEDURE — 93000 ELECTROCARDIOGRAM COMPLETE: CPT

## 2020-11-20 PROCEDURE — 99214 OFFICE O/P EST MOD 30 MIN: CPT | Mod: 25

## 2020-11-20 NOTE — HISTORY OF PRESENT ILLNESS
[FreeTextEntry1] : 84yo female with hx of Anxiety and Depression, CKD, cognitive impairment, accompanied by her dtr Karla,presents for medical clearance for ECT. Pt is followed by psychiatry Dr. Fortunato Guillen. \par has schedule COVID testing day prior to ECT at ECT location.\par Family noticed improvement in mood after ECT.  She occasionally complains of nausea without vomiting or change in appetite. She prefers softer foods.  eating protein (egg, lunch meat, milk)\par \par Pt is relatively independent with ADLs. She cooks, cleans, baths, uses the bathroom independently. Lives at home with her daughter, son-in-law and 2 twin granddaughters who are 10years old. \par \par urinary incontinence wears pad and pullup.  denies dysuria.\par \par denies pain, denies recent falls.

## 2020-11-20 NOTE — PHYSICAL EXAM
[General Appearance - Alert] : alert [General Appearance - In No Acute Distress] : in no acute distress [Sclera] : the sclera and conjunctiva were normal [Extraocular Movements] : extraocular movements were intact [Normal Oral Mucosa] : normal oral mucosa [No Oral Pallor] : no oral pallor [No Oral Cyanosis] : no oral cyanosis [Oropharynx] : The oropharynx was normal [Outer Ear] : the ears and nose were normal in appearance [Neck Appearance] : the appearance of the neck was normal [Respiration, Rhythm And Depth] : normal respiratory rhythm and effort [Exaggerated Use Of Accessory Muscles For Inspiration] : no accessory muscle use [Auscultation Breath Sounds / Voice Sounds] : lungs were clear to auscultation bilaterally [Heart Rate And Rhythm] : heart rate was normal and rhythm regular [Heart Sounds] : normal S1 and S2 [Heart Sounds Gallop] : no gallops [Heart Sounds Pericardial Friction Rub] : no pericardial rub [Full Pulse] : the pedal pulses are present [Edema] : there was no peripheral edema [Abdomen Soft] : soft [Abdomen Tenderness] : non-tender [] : no hepato-splenomegaly [Abdomen Mass (___ Cm)] : no abdominal mass palpated [Abnormal Walk] : normal gait [Nail Clubbing] : no clubbing  or cyanosis of the fingernails [Involuntary Movements] : no involuntary movements were seen [Musculoskeletal - Swelling] : no joint swelling seen [Motor Tone] : muscle strength and tone were normal [Skin Color & Pigmentation] : normal skin color and pigmentation [Sensation] : the sensory exam was normal to light touch and pinprick [Motor Exam] : the motor exam was normal [No Focal Deficits] : no focal deficits [FreeTextEntry1] : anxious

## 2020-11-30 LAB — SARS-COV-2 RNA SPEC QL NAA+PROBE: SIGNIFICANT CHANGE UP

## 2020-12-01 PROCEDURE — 90870 ELECTROCONVULSIVE THERAPY: CPT

## 2020-12-01 RX ORDER — MIDAZOLAM HYDROCHLORIDE 1 MG/ML
2 INJECTION, SOLUTION INTRAMUSCULAR; INTRAVENOUS ONCE
Refills: 0 | Status: DISCONTINUED | OUTPATIENT
Start: 2020-12-01 | End: 2020-12-01

## 2020-12-01 RX ORDER — FLUMAZENIL 0.1 MG/ML
0.2 VIAL (ML) INTRAVENOUS ONCE
Refills: 0 | Status: COMPLETED | OUTPATIENT
Start: 2020-12-01 | End: 2020-12-01

## 2020-12-01 RX ADMIN — Medication 0.2 MILLIGRAM(S): at 10:25

## 2020-12-01 RX ADMIN — MIDAZOLAM HYDROCHLORIDE 2 MILLIGRAM(S): 1 INJECTION, SOLUTION INTRAMUSCULAR; INTRAVENOUS at 10:28

## 2020-12-08 PROCEDURE — 99442: CPT | Mod: 95

## 2020-12-21 PROBLEM — Z87.09 HISTORY OF INFLUENZA: Status: RESOLVED | Noted: 2019-02-11 | Resolved: 2020-12-21

## 2020-12-28 LAB — SARS-COV-2 RNA SPEC QL NAA+PROBE: SIGNIFICANT CHANGE UP

## 2020-12-29 VITALS
HEART RATE: 66 BPM | OXYGEN SATURATION: 100 % | RESPIRATION RATE: 18 BRPM | DIASTOLIC BLOOD PRESSURE: 64 MMHG | SYSTOLIC BLOOD PRESSURE: 169 MMHG | TEMPERATURE: 98 F

## 2020-12-29 DIAGNOSIS — F41.1 GENERALIZED ANXIETY DISORDER: ICD-10-CM

## 2020-12-29 DIAGNOSIS — I10 ESSENTIAL (PRIMARY) HYPERTENSION: ICD-10-CM

## 2020-12-29 PROCEDURE — 90870 ELECTROCONVULSIVE THERAPY: CPT

## 2020-12-29 RX ORDER — MIDAZOLAM HYDROCHLORIDE 1 MG/ML
2 INJECTION, SOLUTION INTRAMUSCULAR; INTRAVENOUS ONCE
Refills: 0 | Status: DISCONTINUED | OUTPATIENT
Start: 2020-12-29 | End: 2020-12-29

## 2020-12-29 RX ORDER — FLUMAZENIL 0.1 MG/ML
0.2 VIAL (ML) INTRAVENOUS ONCE
Refills: 0 | Status: COMPLETED | OUTPATIENT
Start: 2020-12-29 | End: 2020-12-29

## 2020-12-29 RX ADMIN — MIDAZOLAM HYDROCHLORIDE 2 MILLIGRAM(S): 1 INJECTION, SOLUTION INTRAMUSCULAR; INTRAVENOUS at 11:14

## 2020-12-29 RX ADMIN — Medication 0.2 MILLIGRAM(S): at 11:10

## 2020-12-29 NOTE — ECT TREATMENT NOTE - NSECTFOCPEVITALS_PSY_ALL_CORE
Vital Signs Last 24 Hrs  T(C): 36.8 (29 Dec 2020 10:36), Max: 36.8 (29 Dec 2020 10:36)  T(F): 98.3 (29 Dec 2020 10:36), Max: 98.3 (29 Dec 2020 10:36)  HR: 66 (29 Dec 2020 10:36) (66 - 66)  BP: 169/64 (29 Dec 2020 10:36) (169/64 - 169/64)  BP(mean): --  RR: 18 (29 Dec 2020 10:36) (18 - 18)  SpO2: 100% (29 Dec 2020 10:36) (100% - 100%)

## 2020-12-29 NOTE — ECT TREATMENT NOTE - NSECTCOMMENTS_PSY_ALL_CORE
Patient was a bit anxious before the procedure but otherwise cooperative and calm. Complained about nauseas pre-procedure. Patient said that "all is ok". Reported good sleep and appetite.  Denies recurrence of symptoms. Continue monthly sessions.

## 2021-01-04 LAB — SARS-COV-2 RNA SPEC QL NAA+PROBE: SIGNIFICANT CHANGE UP

## 2021-01-04 PROCEDURE — 99442: CPT | Mod: 95

## 2021-01-25 LAB — SARS-COV-2 RNA SPEC QL NAA+PROBE: SIGNIFICANT CHANGE UP

## 2021-01-26 PROCEDURE — 90870 ELECTROCONVULSIVE THERAPY: CPT

## 2021-01-26 RX ORDER — MIDAZOLAM HYDROCHLORIDE 1 MG/ML
2 INJECTION, SOLUTION INTRAMUSCULAR; INTRAVENOUS ONCE
Refills: 0 | Status: DISCONTINUED | OUTPATIENT
Start: 2021-01-26 | End: 2021-01-26

## 2021-01-26 RX ORDER — FLUMAZENIL 0.1 MG/ML
0.2 VIAL (ML) INTRAVENOUS ONCE
Refills: 0 | Status: COMPLETED | OUTPATIENT
Start: 2021-01-26 | End: 2021-01-26

## 2021-01-26 RX ADMIN — Medication 0.2 MILLIGRAM(S): at 11:01

## 2021-01-26 RX ADMIN — MIDAZOLAM HYDROCHLORIDE 2 MILLIGRAM(S): 1 INJECTION, SOLUTION INTRAMUSCULAR; INTRAVENOUS at 11:04

## 2021-01-26 NOTE — ECT TREATMENT NOTE - NSECTCOMMENTS_PSY_ALL_CORE
Patient stable through the entire treatment interval without recurrence of presenting symptoms. Mood, energy, sleep, and appetite were within normal limits. Patient c/o nausea before treatment.

## 2021-01-26 NOTE — ECT TREATMENT NOTE - NSECTFOCPEVITALS_PSY_ALL_CORE
Vital Signs Last 24 Hrs  T(C): 36.8 (26 Jan 2021 10:33), Max: 36.8 (26 Jan 2021 10:33)  T(F): 98.3 (26 Jan 2021 10:33), Max: 98.3 (26 Jan 2021 10:33)  HR: 84 (26 Jan 2021 10:33) (84 - 84)  BP: 158/77 (26 Jan 2021 10:33) (158/77 - 158/77)  BP(mean): --  RR: 20 (26 Jan 2021 10:33) (20 - 20)  SpO2: 96% (26 Jan 2021 10:33) (96% - 96%)

## 2021-02-01 PROCEDURE — 99442: CPT | Mod: 95

## 2021-02-05 ENCOUNTER — APPOINTMENT (OUTPATIENT)
Dept: GERIATRICS | Facility: CLINIC | Age: 86
End: 2021-02-05
Payer: MEDICARE

## 2021-02-05 ENCOUNTER — NON-APPOINTMENT (OUTPATIENT)
Age: 86
End: 2021-02-05

## 2021-02-05 VITALS
OXYGEN SATURATION: 98 % | RESPIRATION RATE: 15 BRPM | BODY MASS INDEX: 26.06 KG/M2 | TEMPERATURE: 96.4 F | SYSTOLIC BLOOD PRESSURE: 118 MMHG | HEIGHT: 61 IN | DIASTOLIC BLOOD PRESSURE: 60 MMHG | WEIGHT: 138 LBS | HEART RATE: 65 BPM

## 2021-02-05 PROCEDURE — 99214 OFFICE O/P EST MOD 30 MIN: CPT | Mod: 25

## 2021-02-05 PROCEDURE — 93000 ELECTROCARDIOGRAM COMPLETE: CPT

## 2021-02-08 LAB
ALBUMIN SERPL ELPH-MCNC: 4.5 G/DL
ALP BLD-CCNC: 51 U/L
ALT SERPL-CCNC: 10 U/L
ANION GAP SERPL CALC-SCNC: 14 MMOL/L
AST SERPL-CCNC: 11 U/L
BASOPHILS # BLD AUTO: 0.08 K/UL
BASOPHILS NFR BLD AUTO: 0.6 %
BILIRUB SERPL-MCNC: 0.3 MG/DL
BUN SERPL-MCNC: 29 MG/DL
CALCIUM SERPL-MCNC: 9.9 MG/DL
CHLORIDE SERPL-SCNC: 100 MMOL/L
CO2 SERPL-SCNC: 21 MMOL/L
CREAT SERPL-MCNC: 1.73 MG/DL
EOSINOPHIL # BLD AUTO: 0.09 K/UL
EOSINOPHIL NFR BLD AUTO: 0.7 %
GLUCOSE SERPL-MCNC: 133 MG/DL
HCT VFR BLD CALC: 39.9 %
HGB BLD-MCNC: 13 G/DL
IMM GRANULOCYTES NFR BLD AUTO: 0.7 %
LYMPHOCYTES # BLD AUTO: 1.69 K/UL
LYMPHOCYTES NFR BLD AUTO: 13.6 %
MAN DIFF?: NORMAL
MCHC RBC-ENTMCNC: 30.1 PG
MCHC RBC-ENTMCNC: 32.6 GM/DL
MCV RBC AUTO: 92.4 FL
MONOCYTES # BLD AUTO: 0.9 K/UL
MONOCYTES NFR BLD AUTO: 7.2 %
NEUTROPHILS # BLD AUTO: 9.6 K/UL
NEUTROPHILS NFR BLD AUTO: 77.2 %
PLATELET # BLD AUTO: 339 K/UL
POTASSIUM SERPL-SCNC: 4.8 MMOL/L
PROT SERPL-MCNC: 6.7 G/DL
RBC # BLD: 4.32 M/UL
RBC # FLD: 12.9 %
SODIUM SERPL-SCNC: 135 MMOL/L
WBC # FLD AUTO: 12.45 K/UL

## 2021-02-08 NOTE — HISTORY OF PRESENT ILLNESS
[FreeTextEntry1] : 86yo female with hx of Anxiety and Depression, CKD, cognitive impairment, accompanied by her dtr Karla,presents for medical clearance for ECT. Pt is followed by psychiatry Dr. Fortunato Guillen. \par \par She has had good response in mood after ECT.  She is eating well. She prefers softer foods.  eating protein (egg, lunch meat, milk), denies any tooth pain/mouth pain/ or dysphagia. \par \par Pt is relatively independent with ADLs. She cooks, cleans, baths, uses the bathroom independently. Lives at home with her daughter, son-in-law and 2 twin granddaughters who are 10years old. \par \par urinary incontinence wears pad and pullup.  denies dysuria.\par \par denies pain, denies recent falls, denies skin rash or breakdown, denies constipation.

## 2021-02-08 NOTE — PHYSICAL EXAM
[General Appearance - Alert] : alert [General Appearance - In No Acute Distress] : in no acute distress [Sclera] : the sclera and conjunctiva were normal [Extraocular Movements] : extraocular movements were intact [Normal Oral Mucosa] : normal oral mucosa [No Oral Pallor] : no oral pallor [No Oral Cyanosis] : no oral cyanosis [Outer Ear] : the ears and nose were normal in appearance [Oropharynx] : The oropharynx was normal [Neck Appearance] : the appearance of the neck was normal [Respiration, Rhythm And Depth] : normal respiratory rhythm and effort [Exaggerated Use Of Accessory Muscles For Inspiration] : no accessory muscle use [Auscultation Breath Sounds / Voice Sounds] : lungs were clear to auscultation bilaterally [Heart Rate And Rhythm] : heart rate was normal and rhythm regular [Heart Sounds] : normal S1 and S2 [Heart Sounds Gallop] : no gallops [Heart Sounds Pericardial Friction Rub] : no pericardial rub [Full Pulse] : the pedal pulses are present [Edema] : there was no peripheral edema [Abdomen Soft] : soft [Abdomen Tenderness] : non-tender [] : no hepato-splenomegaly [Abdomen Mass (___ Cm)] : no abdominal mass palpated [Abnormal Walk] : normal gait [Nail Clubbing] : no clubbing  or cyanosis of the fingernails [Involuntary Movements] : no involuntary movements were seen [Musculoskeletal - Swelling] : no joint swelling seen [Motor Tone] : muscle strength and tone were normal [Skin Color & Pigmentation] : normal skin color and pigmentation [Sensation] : the sensory exam was normal to light touch and pinprick [Motor Exam] : the motor exam was normal [No Focal Deficits] : no focal deficits [FreeTextEntry1] : anxious

## 2021-02-19 ENCOUNTER — RX RENEWAL (OUTPATIENT)
Age: 86
End: 2021-02-19

## 2021-02-22 LAB — SARS-COV-2 RNA SPEC QL NAA+PROBE: SIGNIFICANT CHANGE UP

## 2021-02-23 PROCEDURE — 90870 ELECTROCONVULSIVE THERAPY: CPT

## 2021-02-23 RX ORDER — FLUMAZENIL 0.1 MG/ML
0.2 VIAL (ML) INTRAVENOUS ONCE
Refills: 0 | Status: COMPLETED | OUTPATIENT
Start: 2021-02-23 | End: 2021-02-23

## 2021-02-23 RX ORDER — MIDAZOLAM HYDROCHLORIDE 1 MG/ML
2 INJECTION, SOLUTION INTRAMUSCULAR; INTRAVENOUS ONCE
Refills: 0 | Status: DISCONTINUED | OUTPATIENT
Start: 2021-02-23 | End: 2021-02-23

## 2021-02-23 RX ADMIN — Medication 0.2 MILLIGRAM(S): at 10:47

## 2021-02-23 RX ADMIN — MIDAZOLAM HYDROCHLORIDE 2 MILLIGRAM(S): 1 INJECTION, SOLUTION INTRAMUSCULAR; INTRAVENOUS at 10:53

## 2021-02-23 NOTE — ECT TREATMENT NOTE - NSECTCOMMENTS_PSY_ALL_CORE
Patient is anxious before the treatment but  remained stable through the entire treatment interval without recurrence of affective symptoms. Mood, energy, sleep, and appetite were within normal limits.

## 2021-03-02 PROCEDURE — 99442: CPT | Mod: 95

## 2021-03-22 LAB — SARS-COV-2 RNA SPEC QL NAA+PROBE: SIGNIFICANT CHANGE UP

## 2021-03-23 PROCEDURE — 90870 ELECTROCONVULSIVE THERAPY: CPT

## 2021-03-23 RX ORDER — FLUMAZENIL 0.1 MG/ML
0.2 VIAL (ML) INTRAVENOUS ONCE
Refills: 0 | Status: COMPLETED | OUTPATIENT
Start: 2021-03-23 | End: 2021-03-23

## 2021-03-23 RX ORDER — MIDAZOLAM HYDROCHLORIDE 1 MG/ML
2 INJECTION, SOLUTION INTRAMUSCULAR; INTRAVENOUS ONCE
Refills: 0 | Status: DISCONTINUED | OUTPATIENT
Start: 2021-03-23 | End: 2021-03-23

## 2021-03-23 RX ADMIN — Medication 0.2 MILLIGRAM(S): at 10:49

## 2021-03-23 RX ADMIN — MIDAZOLAM HYDROCHLORIDE 2 MILLIGRAM(S): 1 INJECTION, SOLUTION INTRAMUSCULAR; INTRAVENOUS at 10:50

## 2021-03-23 NOTE — ECT TREATMENT NOTE - NSECTCOMMENTS_PSY_ALL_CORE
Patient is anxious before the treatment. She reports feeling and functioning well through the monthly interval without. She denies recurrence of depressive  symptoms. Mood, energy, sleep, and appetite at her usual level.

## 2021-04-13 PROCEDURE — 99442: CPT | Mod: 95

## 2021-04-19 LAB — SARS-COV-2 RNA SPEC QL NAA+PROBE: SIGNIFICANT CHANGE UP

## 2021-04-20 PROCEDURE — 90870 ELECTROCONVULSIVE THERAPY: CPT

## 2021-04-20 RX ORDER — MIDAZOLAM HYDROCHLORIDE 1 MG/ML
2 INJECTION, SOLUTION INTRAMUSCULAR; INTRAVENOUS ONCE
Refills: 0 | Status: DISCONTINUED | OUTPATIENT
Start: 2021-04-20 | End: 2021-04-20

## 2021-04-20 RX ORDER — FLUMAZENIL 0.1 MG/ML
0.2 VIAL (ML) INTRAVENOUS ONCE
Refills: 0 | Status: COMPLETED | OUTPATIENT
Start: 2021-04-20 | End: 2021-04-20

## 2021-04-20 RX ADMIN — Medication 0.2 MILLIGRAM(S): at 10:19

## 2021-04-20 RX ADMIN — MIDAZOLAM HYDROCHLORIDE 2 MILLIGRAM(S): 1 INJECTION, SOLUTION INTRAMUSCULAR; INTRAVENOUS at 10:22

## 2021-04-20 NOTE — ECT TREATMENT NOTE - NSECTCOMMENTS_PSY_ALL_CORE
Continues to appear anxious before treatment asking for water for her dry mouth.  Appears less anxious that last time I saw her.  When asked how she's doing she shrugs shoulder and smiles saying "I'm here I guess".  Clarifies she is feeling the same as last month and denies worsening of depression/anxiety.

## 2021-05-07 ENCOUNTER — APPOINTMENT (OUTPATIENT)
Dept: GERIATRICS | Facility: CLINIC | Age: 86
End: 2021-05-07
Payer: MEDICARE

## 2021-05-07 ENCOUNTER — NON-APPOINTMENT (OUTPATIENT)
Age: 86
End: 2021-05-07

## 2021-05-07 VITALS
BODY MASS INDEX: 25.89 KG/M2 | HEART RATE: 52 BPM | TEMPERATURE: 97.5 F | OXYGEN SATURATION: 97 % | SYSTOLIC BLOOD PRESSURE: 140 MMHG | WEIGHT: 137.13 LBS | HEIGHT: 61 IN | RESPIRATION RATE: 14 BRPM | DIASTOLIC BLOOD PRESSURE: 62 MMHG

## 2021-05-07 LAB
BASOPHILS # BLD AUTO: 0.08 K/UL
BASOPHILS NFR BLD AUTO: 0.7 %
EOSINOPHIL # BLD AUTO: 0.26 K/UL
EOSINOPHIL NFR BLD AUTO: 2.2 %
HCT VFR BLD CALC: 37.9 %
HGB BLD-MCNC: 12.4 G/DL
IMM GRANULOCYTES NFR BLD AUTO: 0.9 %
LYMPHOCYTES # BLD AUTO: 1.6 K/UL
LYMPHOCYTES NFR BLD AUTO: 13.3 %
MAN DIFF?: NORMAL
MCHC RBC-ENTMCNC: 30.6 PG
MCHC RBC-ENTMCNC: 32.7 GM/DL
MCV RBC AUTO: 93.6 FL
MONOCYTES # BLD AUTO: 0.84 K/UL
MONOCYTES NFR BLD AUTO: 7 %
NEUTROPHILS # BLD AUTO: 9.1 K/UL
NEUTROPHILS NFR BLD AUTO: 75.9 %
PLATELET # BLD AUTO: 317 K/UL
RBC # BLD: 4.05 M/UL
RBC # FLD: 13.2 %
WBC # FLD AUTO: 11.99 K/UL

## 2021-05-07 PROCEDURE — 93000 ELECTROCARDIOGRAM COMPLETE: CPT

## 2021-05-07 PROCEDURE — 99214 OFFICE O/P EST MOD 30 MIN: CPT | Mod: 25

## 2021-05-09 LAB
ALBUMIN SERPL ELPH-MCNC: 4.2 G/DL
ALP BLD-CCNC: 49 U/L
ALT SERPL-CCNC: 14 U/L
ANION GAP SERPL CALC-SCNC: 10 MMOL/L
AST SERPL-CCNC: 15 U/L
BILIRUB SERPL-MCNC: 0.4 MG/DL
BUN SERPL-MCNC: 30 MG/DL
CALCIUM SERPL-MCNC: 9.9 MG/DL
CHLORIDE SERPL-SCNC: 101 MMOL/L
CO2 SERPL-SCNC: 25 MMOL/L
CREAT SERPL-MCNC: 1.51 MG/DL
GLUCOSE SERPL-MCNC: 110 MG/DL
POTASSIUM SERPL-SCNC: 5 MMOL/L
PROT SERPL-MCNC: 6.3 G/DL
SODIUM SERPL-SCNC: 136 MMOL/L
TSH SERPL-ACNC: 1.49 UIU/ML

## 2021-05-10 PROCEDURE — 99442: CPT | Mod: 95

## 2021-05-10 NOTE — DATA REVIEWED
[FreeTextEntry1] : Echocardiogram 2/2109:\par \par Summary:\par  1. Left ventricular ejection fraction, by visual estimation, is 55 to \par 60%.\par  2. Technically fair study.\par  3. Spectral Doppler shows impaired relaxation pattern of left \par ventricular myocardial filling (Grade I diastolic dysfunction).\par  4. There is mild concentric left ventricular hypertrophy.\par  5. Normal right ventricular size and function.\par  6. The left atrium is normal in size.\par  7. The right atrium is normal in size.\par  8. Thickening and calcification of the anterior and posterior mitral \par valve leaflets.\par  9. Mild tricuspid regurgitation.\par 10. Sclerotic aortic valve with normal opening.\par 11. LA volume Index is 27.8 ml/m? ml/m2.\par 12. The aortic valve mean gradient is 4.2 mmHg consistent with normally \par opening aortic valve.\par \par [id number] Denzel Olmedo MD, FACC , Electronically signed on 2/6/2019 at \par 4:11:20 PM\par

## 2021-05-10 NOTE — PHYSICAL EXAM
[General Appearance - Alert] : alert [General Appearance - In No Acute Distress] : in no acute distress [Sclera] : the sclera and conjunctiva were normal [Extraocular Movements] : extraocular movements were intact [Normal Oral Mucosa] : normal oral mucosa [No Oral Pallor] : no oral pallor [No Oral Cyanosis] : no oral cyanosis [Outer Ear] : the ears and nose were normal in appearance [Oropharynx] : The oropharynx was normal [Neck Appearance] : the appearance of the neck was normal [Respiration, Rhythm And Depth] : normal respiratory rhythm and effort [Exaggerated Use Of Accessory Muscles For Inspiration] : no accessory muscle use [Auscultation Breath Sounds / Voice Sounds] : lungs were clear to auscultation bilaterally [Heart Rate And Rhythm] : heart rate was normal and rhythm regular [Heart Sounds] : normal S1 and S2 [Heart Sounds Gallop] : no gallops [Heart Sounds Pericardial Friction Rub] : no pericardial rub [Full Pulse] : the pedal pulses are present [Edema] : there was no peripheral edema [Abdomen Tenderness] : non-tender [Abdomen Soft] : soft [] : no hepato-splenomegaly [Abdomen Mass (___ Cm)] : no abdominal mass palpated [Abnormal Walk] : normal gait [Nail Clubbing] : no clubbing  or cyanosis of the fingernails [Involuntary Movements] : no involuntary movements were seen [Musculoskeletal - Swelling] : no joint swelling seen [Motor Tone] : muscle strength and tone were normal [Sensation] : the sensory exam was normal to light touch and pinprick [Skin Color & Pigmentation] : normal skin color and pigmentation [Motor Exam] : the motor exam was normal [No Focal Deficits] : no focal deficits [FreeTextEntry1] : anxious

## 2021-05-10 NOTE — HISTORY OF PRESENT ILLNESS
[No falls in past year] : Patient reported no falls in the past year [FreeTextEntry1] : 84yo female with hx of Anxiety and Depression, CKD, cognitive impairment, accompanied by her dtr Karla,presents for medical clearance for ECT. Pt is followed by psychiatry Dr. Fortunato Guillen. \par \par She has had good response in mood after ECT.  She is eating well. \par \par Pt is relatively independent with ADLs. She cooks, cleans, baths, uses the bathroom independently. Lives at home with her daughter, son-in-law and 2 twin granddaughters who are 10years old. \par \par urinary incontinence wears pad and pullup.  denies dysuria.\par \par denies pain, denies recent falls, denies skin rash or breakdown, denies constipation.

## 2021-05-17 LAB — SARS-COV-2 RNA SPEC QL NAA+PROBE: SIGNIFICANT CHANGE UP

## 2021-05-18 PROCEDURE — 90870 ELECTROCONVULSIVE THERAPY: CPT

## 2021-05-18 RX ORDER — FLUMAZENIL 0.1 MG/ML
0.2 VIAL (ML) INTRAVENOUS ONCE
Refills: 0 | Status: COMPLETED | OUTPATIENT
Start: 2021-05-18 | End: 2021-05-18

## 2021-05-18 RX ORDER — MIDAZOLAM HYDROCHLORIDE 1 MG/ML
2 INJECTION, SOLUTION INTRAMUSCULAR; INTRAVENOUS ONCE
Refills: 0 | Status: DISCONTINUED | OUTPATIENT
Start: 2021-05-18 | End: 2021-05-18

## 2021-05-18 RX ADMIN — Medication 0.2 MILLIGRAM(S): at 10:32

## 2021-05-18 RX ADMIN — MIDAZOLAM HYDROCHLORIDE 2 MILLIGRAM(S): 1 INJECTION, SOLUTION INTRAMUSCULAR; INTRAVENOUS at 10:41

## 2021-05-18 NOTE — ECT TREATMENT NOTE - NSECTCOMMENTS_PSY_ALL_CORE
Very anxious before the treatment but reports feeling and functioning well during the monthly interval.

## 2021-06-09 ENCOUNTER — RX RENEWAL (OUTPATIENT)
Age: 86
End: 2021-06-09

## 2021-06-14 LAB — SARS-COV-2 RNA SPEC QL NAA+PROBE: SIGNIFICANT CHANGE UP

## 2021-06-15 PROCEDURE — 90870 ELECTROCONVULSIVE THERAPY: CPT

## 2021-06-15 RX ORDER — MIDAZOLAM HYDROCHLORIDE 1 MG/ML
2 INJECTION, SOLUTION INTRAMUSCULAR; INTRAVENOUS ONCE
Refills: 0 | Status: DISCONTINUED | OUTPATIENT
Start: 2021-06-15 | End: 2021-06-15

## 2021-06-15 RX ORDER — FLUMAZENIL 0.1 MG/ML
0.2 VIAL (ML) INTRAVENOUS ONCE
Refills: 0 | Status: COMPLETED | OUTPATIENT
Start: 2021-06-15 | End: 2021-06-15

## 2021-06-15 RX ADMIN — MIDAZOLAM HYDROCHLORIDE 2 MILLIGRAM(S): 1 INJECTION, SOLUTION INTRAMUSCULAR; INTRAVENOUS at 09:53

## 2021-06-15 RX ADMIN — Medication 0.2 MILLIGRAM(S): at 09:49

## 2021-06-16 DIAGNOSIS — F32.2 MAJOR DEPRESSIVE DISORDER, SINGLE EPISODE, SEVERE WITHOUT PSYCHOTIC FEATURES: ICD-10-CM

## 2021-06-16 DIAGNOSIS — F41.1 GENERALIZED ANXIETY DISORDER: ICD-10-CM

## 2021-06-21 PROCEDURE — 99442: CPT | Mod: 95

## 2021-07-12 LAB — SARS-COV-2 RNA SPEC QL NAA+PROBE: SIGNIFICANT CHANGE UP

## 2021-07-13 PROCEDURE — 90870 ELECTROCONVULSIVE THERAPY: CPT

## 2021-07-13 RX ORDER — MIDAZOLAM HYDROCHLORIDE 1 MG/ML
2 INJECTION, SOLUTION INTRAMUSCULAR; INTRAVENOUS ONCE
Refills: 0 | Status: DISCONTINUED | OUTPATIENT
Start: 2021-07-13 | End: 2021-07-13

## 2021-07-13 RX ORDER — FLUMAZENIL 0.1 MG/ML
0.2 VIAL (ML) INTRAVENOUS ONCE
Refills: 0 | Status: COMPLETED | OUTPATIENT
Start: 2021-07-13 | End: 2021-07-13

## 2021-07-13 RX ADMIN — MIDAZOLAM HYDROCHLORIDE 2 MILLIGRAM(S): 1 INJECTION, SOLUTION INTRAMUSCULAR; INTRAVENOUS at 10:43

## 2021-07-13 RX ADMIN — Medication 0.2 MILLIGRAM(S): at 10:41

## 2021-07-13 NOTE — ECT PRE-PROCEDURE CHECKLIST - NSBHSUPPORTNAME_PSY_ALL_CORE
Same as above

## 2021-07-13 NOTE — ECT PRE-PROCEDURE CHECKLIST - NSBHSUPPORTCOMMENT_PSY_ALL_CORE
Right Unilateral ECT Consent 11/3/21  Anesthesia 5/23/21
ECT Consent 11/3/21  Anesthesia 2/3/21
Right Unilateral ECT Consent 11/3/21  Anesthesia 5/23/21
ECT Consent 11/3/21  Anesthesia 2/3/21
Right Unilateral ECT Consent 11/3/21  Anesthesia 5/23/21

## 2021-07-13 NOTE — ECT TREATMENT NOTE - NSECTCOMMENTS_PSY_ALL_CORE
Talked to Ihsan, son in law, who reports that patient has been "pretty stable". Denies any falls or episodes of aggression. He was ok with patient staying on monthly sessions. Patient was very anxious before the procedure but this was consistent with previous ECT sessions. Continue sessions q 4 weeks

## 2021-08-02 ENCOUNTER — NON-APPOINTMENT (OUTPATIENT)
Age: 86
End: 2021-08-02

## 2021-08-02 ENCOUNTER — APPOINTMENT (OUTPATIENT)
Dept: GERIATRICS | Facility: CLINIC | Age: 86
End: 2021-08-02
Payer: MEDICARE

## 2021-08-02 VITALS
OXYGEN SATURATION: 96 % | BODY MASS INDEX: 26.67 KG/M2 | SYSTOLIC BLOOD PRESSURE: 138 MMHG | RESPIRATION RATE: 15 BRPM | DIASTOLIC BLOOD PRESSURE: 60 MMHG | WEIGHT: 141.25 LBS | HEIGHT: 61 IN | HEART RATE: 69 BPM | TEMPERATURE: 97.5 F

## 2021-08-02 DIAGNOSIS — Z63.6 DEPENDENT RELATIVE NEEDING CARE AT HOME: ICD-10-CM

## 2021-08-02 PROCEDURE — 99214 OFFICE O/P EST MOD 30 MIN: CPT | Mod: 25

## 2021-08-02 PROCEDURE — 93000 ELECTROCARDIOGRAM COMPLETE: CPT

## 2021-08-02 SDOH — SOCIAL STABILITY - SOCIAL INSECURITY: DEPENDENT RELATIVE NEEDING CARE AT HOME: Z63.6

## 2021-08-04 PROBLEM — Z63.6 CAREGIVER STRESS: Status: ACTIVE | Noted: 2020-06-05

## 2021-08-04 LAB
ALBUMIN SERPL ELPH-MCNC: 4.6 G/DL
ALP BLD-CCNC: 52 U/L
ALT SERPL-CCNC: 9 U/L
ANION GAP SERPL CALC-SCNC: 15 MMOL/L
AST SERPL-CCNC: 12 U/L
BASOPHILS # BLD AUTO: 0.08 K/UL
BASOPHILS NFR BLD AUTO: 0.7 %
BILIRUB SERPL-MCNC: 0.3 MG/DL
BUN SERPL-MCNC: 24 MG/DL
CALCIUM SERPL-MCNC: 10.2 MG/DL
CHLORIDE SERPL-SCNC: 98 MMOL/L
CO2 SERPL-SCNC: 21 MMOL/L
CREAT SERPL-MCNC: 1.42 MG/DL
EOSINOPHIL # BLD AUTO: 0.09 K/UL
EOSINOPHIL NFR BLD AUTO: 0.8 %
GLUCOSE SERPL-MCNC: 156 MG/DL
HCT VFR BLD CALC: 37.6 %
HGB BLD-MCNC: 12.5 G/DL
IMM GRANULOCYTES NFR BLD AUTO: 0.5 %
LYMPHOCYTES # BLD AUTO: 1.69 K/UL
LYMPHOCYTES NFR BLD AUTO: 14.9 %
MAN DIFF?: NORMAL
MCHC RBC-ENTMCNC: 30.3 PG
MCHC RBC-ENTMCNC: 33.2 GM/DL
MCV RBC AUTO: 91 FL
MONOCYTES # BLD AUTO: 0.93 K/UL
MONOCYTES NFR BLD AUTO: 8.2 %
NEUTROPHILS # BLD AUTO: 8.46 K/UL
NEUTROPHILS NFR BLD AUTO: 74.9 %
PLATELET # BLD AUTO: 305 K/UL
POTASSIUM SERPL-SCNC: 4.7 MMOL/L
PROT SERPL-MCNC: 6.8 G/DL
RBC # BLD: 4.13 M/UL
RBC # FLD: 13 %
SODIUM SERPL-SCNC: 134 MMOL/L
TSH SERPL-ACNC: 1.04 UIU/ML
WBC # FLD AUTO: 11.31 K/UL

## 2021-08-04 NOTE — ASSESSMENT
[FreeTextEntry1] : c/w trazodone 150mg qhs for insomnia.\par mild hyponatremia of 134: monitor, repeat with next routine labwork\par \par

## 2021-08-04 NOTE — HISTORY OF PRESENT ILLNESS
[No falls in past year] : Patient reported no falls in the past year [FreeTextEntry1] : 85yo female with hx of anxiety and Depression, CKD, cognitive impairment, accompanied by her dtr Krala,presents for medical clearance for ECT. Pt is followed by psychiatry Dr. Fortunato Guillen. \par \par She has had good response in mood after ECT.  She is eating well. \par \par Pt is relatively independent with ADLs. She cooks, cleans, baths, uses the bathroom independently. Lives at home with her daughter, son-in-law and 2 twin granddaughters.\par \par urinary incontinence wears pad and pullup.  denies dysuria.\par \par denies pain, denies recent falls, denies skin rash or breakdown, denies constipation.

## 2021-08-04 NOTE — DATA REVIEWED
[FreeTextEntry1] : Echocardiogram 2/2109:\par \par Summary:\par  1. Left ventricular ejection fraction, by visual estimation, is 55 to \par 60%.\par  2. Technically fair study.\par  3. Spectral Doppler shows impaired relaxation pattern of left \par ventricular myocardial filling (Grade I diastolic dysfunction).\par  4. There is mild concentric left ventricular hypertrophy.\par  5. Normal right ventricular size and function.\par  6. The left atrium is normal in size.\par  7. The right atrium is normal in size.\par  8. Thickening and calcification of the anterior and posterior mitral \par valve leaflets.\par  9. Mild tricuspid regurgitation.\par 10. Sclerotic aortic valve with normal opening.\par 11. LA volume Index is 27.8 ml/m? ml/m2.\par 12. The aortic valve mean gradient is 4.2 mmHg consistent with normally \par opening aortic valve.\par \par [id number] Denzel Olmedo MD, FACC , Electronically signed on 2/6/2019 at \par 4:11:20 PM\par

## 2021-08-09 LAB — SARS-COV-2 RNA SPEC QL NAA+PROBE: SIGNIFICANT CHANGE UP

## 2021-08-10 PROCEDURE — 90870 ELECTROCONVULSIVE THERAPY: CPT

## 2021-08-10 RX ORDER — MIDAZOLAM HYDROCHLORIDE 1 MG/ML
2 INJECTION, SOLUTION INTRAMUSCULAR; INTRAVENOUS ONCE
Refills: 0 | Status: DISCONTINUED | OUTPATIENT
Start: 2021-08-10 | End: 2021-08-10

## 2021-08-10 RX ORDER — FLUMAZENIL 0.1 MG/ML
0.2 VIAL (ML) INTRAVENOUS ONCE
Refills: 0 | Status: COMPLETED | OUTPATIENT
Start: 2021-08-10 | End: 2021-08-10

## 2021-08-10 RX ADMIN — MIDAZOLAM HYDROCHLORIDE 2 MILLIGRAM(S): 1 INJECTION, SOLUTION INTRAMUSCULAR; INTRAVENOUS at 10:48

## 2021-08-10 RX ADMIN — Medication 0.2 MILLIGRAM(S): at 10:44

## 2021-08-10 NOTE — ECT TREATMENT NOTE - NSECTCOMMENTS_PSY_ALL_CORE
States she is ok but anxious.  Denies feeling depressed.   Spoke to son Ihsan who agrees she has been stable over last month.  Some confusion when changing locations (went to daughters house and thought she lived there).  Overall Ihsan agrees she is stable. Continue monthly ECT.

## 2021-08-12 PROCEDURE — 99442: CPT | Mod: 95

## 2021-09-07 ENCOUNTER — RX RENEWAL (OUTPATIENT)
Age: 86
End: 2021-09-07

## 2021-09-13 LAB — SARS-COV-2 RNA SPEC QL NAA+PROBE: SIGNIFICANT CHANGE UP

## 2021-09-16 PROCEDURE — 90870 ELECTROCONVULSIVE THERAPY: CPT

## 2021-09-16 RX ORDER — MIDAZOLAM HYDROCHLORIDE 1 MG/ML
2 INJECTION, SOLUTION INTRAMUSCULAR; INTRAVENOUS ONCE
Refills: 0 | Status: DISCONTINUED | OUTPATIENT
Start: 2021-09-16 | End: 2021-09-16

## 2021-09-16 RX ORDER — FLUMAZENIL 0.1 MG/ML
0.2 VIAL (ML) INTRAVENOUS ONCE
Refills: 0 | Status: COMPLETED | OUTPATIENT
Start: 2021-09-16 | End: 2021-09-16

## 2021-09-16 RX ADMIN — MIDAZOLAM HYDROCHLORIDE 2 MILLIGRAM(S): 1 INJECTION, SOLUTION INTRAMUSCULAR; INTRAVENOUS at 09:13

## 2021-09-16 RX ADMIN — Medication 0.2 MILLIGRAM(S): at 09:08

## 2021-09-16 NOTE — ECT AMBULATORY DISCHARGE PLAN - NSPOSTECTADDINSTRFUCARE_PSY_ALL_CORE
come for covid testing on 10/12/21 between 4-6pm  reminder for medical clearance  due on 10/31/21 come for covid testing on 10/11/21 between 8-10 am  reminder for medical clearance  due on 10/31/21

## 2021-09-16 NOTE — ECT TREATMENT NOTE - NSECTCOMMENTS_PSY_ALL_CORE
Appears stable, somewhat anxious but more calm than past visits. Denies feeling depressed.   Will continue monthly ECT.

## 2021-09-27 PROCEDURE — 99442: CPT | Mod: 95

## 2021-10-11 LAB — SARS-COV-2 RNA SPEC QL NAA+PROBE: SIGNIFICANT CHANGE UP

## 2021-10-12 PROCEDURE — 90870 ELECTROCONVULSIVE THERAPY: CPT

## 2021-10-12 RX ORDER — MIDAZOLAM HYDROCHLORIDE 1 MG/ML
2 INJECTION, SOLUTION INTRAMUSCULAR; INTRAVENOUS ONCE
Refills: 0 | Status: DISCONTINUED | OUTPATIENT
Start: 2021-10-12 | End: 2021-10-12

## 2021-10-12 RX ORDER — FLUMAZENIL 0.1 MG/ML
0.2 VIAL (ML) INTRAVENOUS ONCE
Refills: 0 | Status: COMPLETED | OUTPATIENT
Start: 2021-10-12 | End: 2021-10-12

## 2021-10-12 RX ADMIN — MIDAZOLAM HYDROCHLORIDE 2 MILLIGRAM(S): 1 INJECTION, SOLUTION INTRAMUSCULAR; INTRAVENOUS at 09:13

## 2021-10-12 RX ADMIN — Medication 0.2 MILLIGRAM(S): at 09:11

## 2021-10-12 NOTE — ECT TREATMENT NOTE - NSECTCOMMENTS_PSY_ALL_CORE
Pt presents anxious but less so than past visits. States otherwise she is doing well. Denies relapse of depression.  RN who did COVID test on pt yesterday report pt was calm and in good spirits.  Will continue q4 week ECT.

## 2021-10-13 DIAGNOSIS — I10 ESSENTIAL (PRIMARY) HYPERTENSION: ICD-10-CM

## 2021-10-22 ENCOUNTER — APPOINTMENT (OUTPATIENT)
Dept: GERIATRICS | Facility: CLINIC | Age: 86
End: 2021-10-22
Payer: MEDICARE

## 2021-10-22 ENCOUNTER — NON-APPOINTMENT (OUTPATIENT)
Age: 86
End: 2021-10-22

## 2021-10-22 VITALS
SYSTOLIC BLOOD PRESSURE: 140 MMHG | BODY MASS INDEX: 27 KG/M2 | RESPIRATION RATE: 16 BRPM | HEIGHT: 61.2 IN | WEIGHT: 143 LBS | DIASTOLIC BLOOD PRESSURE: 60 MMHG | HEART RATE: 70 BPM | OXYGEN SATURATION: 96 %

## 2021-10-22 PROCEDURE — 90662 IIV NO PRSV INCREASED AG IM: CPT

## 2021-10-22 PROCEDURE — 93000 ELECTROCARDIOGRAM COMPLETE: CPT

## 2021-10-22 PROCEDURE — G0008: CPT

## 2021-10-22 PROCEDURE — 99214 OFFICE O/P EST MOD 30 MIN: CPT | Mod: 25

## 2021-10-27 DIAGNOSIS — Z23 ENCOUNTER FOR IMMUNIZATION: ICD-10-CM

## 2021-11-01 LAB
ALBUMIN SERPL ELPH-MCNC: 4.4 G/DL
ALP BLD-CCNC: 56 U/L
ALT SERPL-CCNC: 7 U/L
ANION GAP SERPL CALC-SCNC: 17 MMOL/L
AST SERPL-CCNC: 14 U/L
BASOPHILS # BLD AUTO: 0.07 K/UL
BASOPHILS NFR BLD AUTO: 0.6 %
BILIRUB SERPL-MCNC: 0.4 MG/DL
BUN SERPL-MCNC: 26 MG/DL
CALCIUM SERPL-MCNC: 10 MG/DL
CHLORIDE SERPL-SCNC: 100 MMOL/L
CO2 SERPL-SCNC: 20 MMOL/L
CREAT SERPL-MCNC: 1.47 MG/DL
EOSINOPHIL # BLD AUTO: 0.13 K/UL
EOSINOPHIL NFR BLD AUTO: 1.1 %
GLUCOSE SERPL-MCNC: 86 MG/DL
HCT VFR BLD CALC: 40.5 %
HGB BLD-MCNC: 13.2 G/DL
IMM GRANULOCYTES NFR BLD AUTO: 1 %
LYMPHOCYTES # BLD AUTO: 2.76 K/UL
LYMPHOCYTES NFR BLD AUTO: 24.2 %
MAN DIFF?: NORMAL
MCHC RBC-ENTMCNC: 30.6 PG
MCHC RBC-ENTMCNC: 32.6 GM/DL
MCV RBC AUTO: 94 FL
MONOCYTES # BLD AUTO: 0.82 K/UL
MONOCYTES NFR BLD AUTO: 7.2 %
NEUTROPHILS # BLD AUTO: 7.53 K/UL
NEUTROPHILS NFR BLD AUTO: 65.9 %
PLATELET # BLD AUTO: 358 K/UL
POTASSIUM SERPL-SCNC: 4.8 MMOL/L
PROT SERPL-MCNC: 6.7 G/DL
RBC # BLD: 4.31 M/UL
RBC # FLD: 12.7 %
SODIUM SERPL-SCNC: 136 MMOL/L
WBC # FLD AUTO: 11.42 K/UL

## 2021-11-01 NOTE — DATA REVIEWED
[FreeTextEntry1] : Echocardiogram 2/2109:\par \par Summary:\par  1. Left ventricular ejection fraction, by visual estimation, is 55 to \par 60%.\par  2. Technically fair study.\par  3. Spectral Doppler shows impaired relaxation pattern of left \par ventricular myocardial filling (Grade I diastolic dysfunction).\par  4. There is mild concentric left ventricular hypertrophy.\par  5. Normal right ventricular size and function.\par  6. The left atrium is normal in size.\par  7. The right atrium is normal in size.\par  8. Thickening and calcification of the anterior and posterior mitral \par valve leaflets.\par  9. Mild tricuspid regurgitation.\par 10. Sclerotic aortic valve with normal opening.\par 11. LA volume Index is 27.8 ml/m? ml/m2.\par 12. The aortic valve mean gradient is 4.2 mmHg consistent with normally \par opening aortic valve.\par \par [id number] Denzel Olmedo MD, FACC , Electronically signed on 2/6/2019 at \par 4:11:20 PM\par

## 2021-11-01 NOTE — HISTORY OF PRESENT ILLNESS
[No falls in past year] : Patient reported no falls in the past year [FreeTextEntry1] : 87yo female with hx of anxiety and Depression, CKD, cognitive impairment, accompanied by her dtr Karla,presents for medical clearance for ECT. Pt is followed by psychiatry Dr. Fortunato Guillen. \par \par She has had good response in mood after ECT.  She is eating well. drinking milk.\par \par Pt is relatively independent with ADLs. She cooks, cleans, baths, uses the bathroom independently. Lives at home with her daughter, son-in-law and 2 twin granddaughters.\par \par urinary incontinence wears pad and pullup.  \par note worse urinary frequency lately\par but denies dysuria.\par \par denies falls, ab pain, constipation/diarrhea,.

## 2021-11-02 PROCEDURE — 99442: CPT | Mod: 95

## 2021-11-08 ENCOUNTER — RX RENEWAL (OUTPATIENT)
Age: 86
End: 2021-11-08

## 2021-11-08 LAB — SARS-COV-2 RNA SPEC QL NAA+PROBE: SIGNIFICANT CHANGE UP

## 2021-11-09 RX ORDER — FLUMAZENIL 0.1 MG/ML
0.2 VIAL (ML) INTRAVENOUS ONCE
Refills: 0 | Status: COMPLETED | OUTPATIENT
Start: 2021-11-09 | End: 2021-11-09

## 2021-11-09 RX ORDER — MIDAZOLAM HYDROCHLORIDE 1 MG/ML
2 INJECTION, SOLUTION INTRAMUSCULAR; INTRAVENOUS ONCE
Refills: 0 | Status: DISCONTINUED | OUTPATIENT
Start: 2021-11-09 | End: 2021-11-09

## 2021-11-09 RX ADMIN — MIDAZOLAM HYDROCHLORIDE 2 MILLIGRAM(S): 1 INJECTION, SOLUTION INTRAMUSCULAR; INTRAVENOUS at 09:43

## 2021-11-09 RX ADMIN — Medication 0.2 MILLIGRAM(S): at 09:38

## 2021-11-09 NOTE — ECT TREATMENT NOTE - NSECTCOMMENTS_PSY_ALL_CORE
Patient reports that she is doing well from a depression perspective.  Denies acute sx incl low mood, anhedonia, insomnia, SI.  Pt does endorse anxiety related to treatment but otherwise feels as though she has benefitted greatly from ECT.  Pt denies cog AE and is amenable to continuing mECT q4wks

## 2021-12-06 LAB — SARS-COV-2 RNA SPEC QL NAA+PROBE: SIGNIFICANT CHANGE UP

## 2021-12-07 PROCEDURE — 90870 ELECTROCONVULSIVE THERAPY: CPT

## 2021-12-07 RX ORDER — MIDAZOLAM HYDROCHLORIDE 1 MG/ML
2 INJECTION, SOLUTION INTRAMUSCULAR; INTRAVENOUS ONCE
Refills: 0 | Status: DISCONTINUED | OUTPATIENT
Start: 2021-12-07 | End: 2021-12-07

## 2021-12-07 RX ORDER — FLUMAZENIL 0.1 MG/ML
0.2 VIAL (ML) INTRAVENOUS ONCE
Refills: 0 | Status: COMPLETED | OUTPATIENT
Start: 2021-12-07 | End: 2021-12-07

## 2021-12-07 RX ADMIN — Medication 0.2 MILLIGRAM(S): at 08:51

## 2021-12-07 RX ADMIN — MIDAZOLAM HYDROCHLORIDE 2 MILLIGRAM(S): 1 INJECTION, SOLUTION INTRAMUSCULAR; INTRAVENOUS at 08:52

## 2021-12-07 NOTE — ECT TREATMENT NOTE - NSECTCOMMENTS_PSY_ALL_CORE
Pt is anxious before the treatment c/o dry mouth. She reports that she had a good month with no recurrence of depressive symptoms. We'll continue with monthly maintenance

## 2021-12-07 NOTE — ED PROVIDER NOTE - CROS ED ENMT ALL NEG
- - - Crescentic Advancement Flap Text: The defect edges were debeveled with a #15 scalpel blade.  Given the location of the defect and the proximity to free margins a crescentic advancement flap was deemed most appropriate.  Using a sterile surgical marker, the appropriate advancement flap was drawn incorporating the defect and placing the expected incisions within the relaxed skin tension lines where possible.    The area thus outlined was incised deep to adipose tissue with a #15 scalpel blade.  The skin margins were undermined to an appropriate distance in all directions utilizing iris scissors.

## 2021-12-13 PROCEDURE — 99442: CPT | Mod: 95

## 2022-01-03 LAB — SARS-COV-2 RNA SPEC QL NAA+PROBE: SIGNIFICANT CHANGE UP

## 2022-01-04 RX ORDER — MIDAZOLAM HYDROCHLORIDE 1 MG/ML
2 INJECTION, SOLUTION INTRAMUSCULAR; INTRAVENOUS ONCE
Refills: 0 | Status: DISCONTINUED | OUTPATIENT
Start: 2022-01-04 | End: 2022-01-04

## 2022-01-04 RX ORDER — FLUMAZENIL 0.1 MG/ML
0.2 VIAL (ML) INTRAVENOUS ONCE
Refills: 0 | Status: COMPLETED | OUTPATIENT
Start: 2022-01-04 | End: 2022-01-04

## 2022-01-04 RX ADMIN — Medication 0.2 MILLIGRAM(S): at 08:52

## 2022-01-04 RX ADMIN — MIDAZOLAM HYDROCHLORIDE 2 MILLIGRAM(S): 1 INJECTION, SOLUTION INTRAMUSCULAR; INTRAVENOUS at 08:55

## 2022-01-04 NOTE — ECT TREATMENT NOTE - NSECTCOMMENTS_PSY_ALL_CORE
Pt reports that she is doing well and has no active sx of depression.  States that she enjoyed her holidays.  C/o anxiety and dry mouth prior to treatment but otherwise feels at her psychiatric and physical baseline.  Denied AE from ECT and offered no further complaints.  Amenable to continuing mECT at current parameters/schedule.

## 2022-01-07 ENCOUNTER — APPOINTMENT (OUTPATIENT)
Dept: GERIATRICS | Facility: CLINIC | Age: 87
End: 2022-01-07
Payer: MEDICARE

## 2022-01-07 ENCOUNTER — NON-APPOINTMENT (OUTPATIENT)
Age: 87
End: 2022-01-07

## 2022-01-07 VITALS
TEMPERATURE: 96.9 F | RESPIRATION RATE: 16 BRPM | DIASTOLIC BLOOD PRESSURE: 54 MMHG | BODY MASS INDEX: 26.65 KG/M2 | SYSTOLIC BLOOD PRESSURE: 138 MMHG | WEIGHT: 142 LBS | HEART RATE: 88 BPM | OXYGEN SATURATION: 97 %

## 2022-01-07 DIAGNOSIS — H04.129 DRY EYE SYNDROME OF UNSPECIFIED LACRIMAL GLAND: ICD-10-CM

## 2022-01-07 PROCEDURE — 99214 OFFICE O/P EST MOD 30 MIN: CPT | Mod: 25

## 2022-01-07 PROCEDURE — 93000 ELECTROCARDIOGRAM COMPLETE: CPT

## 2022-01-14 LAB
ALBUMIN SERPL ELPH-MCNC: 4.3 G/DL
ALP BLD-CCNC: 53 U/L
ALT SERPL-CCNC: 6 U/L
ANION GAP SERPL CALC-SCNC: 16 MMOL/L
AST SERPL-CCNC: 12 U/L
BASOPHILS # BLD AUTO: 0.08 K/UL
BASOPHILS NFR BLD AUTO: 0.7 %
BILIRUB SERPL-MCNC: 0.4 MG/DL
BUN SERPL-MCNC: 21 MG/DL
CALCIUM SERPL-MCNC: 9.7 MG/DL
CHLORIDE SERPL-SCNC: 100 MMOL/L
CO2 SERPL-SCNC: 20 MMOL/L
CREAT SERPL-MCNC: 1.47 MG/DL
EOSINOPHIL # BLD AUTO: 0.15 K/UL
EOSINOPHIL NFR BLD AUTO: 1.3 %
GLUCOSE SERPL-MCNC: 170 MG/DL
HCT VFR BLD CALC: 38.5 %
HGB BLD-MCNC: 12.5 G/DL
IMM GRANULOCYTES NFR BLD AUTO: 0.5 %
LYMPHOCYTES # BLD AUTO: 2.11 K/UL
LYMPHOCYTES NFR BLD AUTO: 18.3 %
MAN DIFF?: NORMAL
MCHC RBC-ENTMCNC: 30 PG
MCHC RBC-ENTMCNC: 32.5 GM/DL
MCV RBC AUTO: 92.5 FL
MONOCYTES # BLD AUTO: 0.62 K/UL
MONOCYTES NFR BLD AUTO: 5.4 %
NEUTROPHILS # BLD AUTO: 8.49 K/UL
NEUTROPHILS NFR BLD AUTO: 73.8 %
PLATELET # BLD AUTO: 319 K/UL
POTASSIUM SERPL-SCNC: 4.7 MMOL/L
PROT SERPL-MCNC: 6.5 G/DL
RBC # BLD: 4.16 M/UL
RBC # FLD: 13.3 %
SODIUM SERPL-SCNC: 136 MMOL/L
TSH SERPL-ACNC: 2.12 UIU/ML
WBC # FLD AUTO: 11.51 K/UL

## 2022-01-14 NOTE — ASSESSMENT
[FreeTextEntry1] : c/w trazodone 150mg qhs for insomnia.\par \par \par FAX to ECT :  [phone]\par \par phone for ECT: [phone]\par

## 2022-01-14 NOTE — HISTORY OF PRESENT ILLNESS
[No falls in past year] : Patient reported no falls in the past year [FreeTextEntry1] : 87yo female with hx of anxiety and Depression, CKD, cognitive impairment, accompanied by her dtr Karla,presents for medical clearance for ECT. Pt is followed by psychiatry Dr. Fortunato Guillen. \par \par Notes increased itchy eyes with some dryness.  also with dry skin surround skin.  has f/u with ophthal soon.\par \par She has had good response in mood after ECT.  She is eating well. \par \par Pt is relatively independent with ADLs. She cooks, cleans, baths, uses the bathroom independently. Lives at home with her daughter, son-in-law and 2 twin granddaughters.\par \par urinary incontinence wears pad and pullup.  \par denies dysuria or vaginal pain\par \par denies falls, ab pain, constipation/diarrhea, or joint pain.\par \par Memory loss\par worse lately\par but still able to manage ADL's\par \par more sedentary lately\par but no falls

## 2022-01-14 NOTE — DATA REVIEWED
[FreeTextEntry1] : Echocardiogram 2/2109:\par \par Summary:\par  1. Left ventricular ejection fraction, by visual estimation, is 55 to \par 60%.\par  2. Technically fair study.\par  3. Spectral Doppler shows impaired relaxation pattern of left \par ventricular myocardial filling (Grade I diastolic dysfunction).\par  4. There is mild concentric left ventricular hypertrophy.\par  5. Normal right ventricular size and function.\par  6. The left atrium is normal in size.\par  7. The right atrium is normal in size.\par  8. Thickening and calcification of the anterior and posterior mitral \par valve leaflets.\par  9. Mild tricuspid regurgitation.\par 10. Sclerotic aortic valve with normal opening.\par 11. LA volume Index is 27.8 ml/m? ml/m2.\par 12. The aortic valve mean gradient is 4.2 mmHg consistent with normally \par opening aortic valve.\par \par [id number] Denzel Olmedo MD, FACC , Electronically signed on 2/6/2019 at \par 4:11:20 PM\par

## 2022-01-14 NOTE — PHYSICAL EXAM
[General Appearance - Alert] : alert [General Appearance - In No Acute Distress] : in no acute distress [Extraocular Movements] : extraocular movements were intact [Normal Oral Mucosa] : normal oral mucosa [No Oral Pallor] : no oral pallor [No Oral Cyanosis] : no oral cyanosis [Outer Ear] : the ears and nose were normal in appearance [Oropharynx] : The oropharynx was normal [Neck Appearance] : the appearance of the neck was normal [Respiration, Rhythm And Depth] : normal respiratory rhythm and effort [Exaggerated Use Of Accessory Muscles For Inspiration] : no accessory muscle use [Auscultation Breath Sounds / Voice Sounds] : lungs were clear to auscultation bilaterally [Heart Rate And Rhythm] : heart rate was normal and rhythm regular [Heart Sounds] : normal S1 and S2 [Heart Sounds Gallop] : no gallops [Heart Sounds Pericardial Friction Rub] : no pericardial rub [Full Pulse] : the pedal pulses are present [Edema] : there was no peripheral edema [Abdomen Soft] : soft [Abdomen Tenderness] : non-tender [] : no hepato-splenomegaly [Abdomen Mass (___ Cm)] : no abdominal mass palpated [Abnormal Walk] : normal gait [Nail Clubbing] : no clubbing  or cyanosis of the fingernails [Involuntary Movements] : no involuntary movements were seen [Musculoskeletal - Swelling] : no joint swelling seen [Motor Tone] : muscle strength and tone were normal [Skin Color & Pigmentation] : normal skin color and pigmentation [Sensation] : the sensory exam was normal to light touch and pinprick [Motor Exam] : the motor exam was normal [No Focal Deficits] : no focal deficits [FreeTextEntry1] : anxious

## 2022-01-18 PROCEDURE — 99442: CPT | Mod: 95

## 2022-02-07 LAB — SARS-COV-2 RNA SPEC QL NAA+PROBE: SIGNIFICANT CHANGE UP

## 2022-02-08 RX ORDER — MIDAZOLAM HYDROCHLORIDE 1 MG/ML
2 INJECTION, SOLUTION INTRAMUSCULAR; INTRAVENOUS ONCE
Refills: 0 | Status: DISCONTINUED | OUTPATIENT
Start: 2022-02-08 | End: 2022-02-08

## 2022-02-08 RX ORDER — FLUMAZENIL 0.1 MG/ML
0.2 VIAL (ML) INTRAVENOUS ONCE
Refills: 0 | Status: COMPLETED | OUTPATIENT
Start: 2022-02-08 | End: 2022-02-08

## 2022-02-08 RX ADMIN — Medication 0.2 MILLIGRAM(S): at 09:20

## 2022-02-08 RX ADMIN — MIDAZOLAM HYDROCHLORIDE 2 MILLIGRAM(S): 1 INJECTION, SOLUTION INTRAMUSCULAR; INTRAVENOUS at 09:25

## 2022-02-08 NOTE — ECT TREATMENT NOTE - NSECTCOMMENTS_PSY_ALL_CORE
Pt reports she is doing well.  C/o dry mouth prior to treatment but feels as though she has been at her psychiatric baseline since last session and denied all active sx of depression.  Pt denied AE from ECT, amenable to continuing mECT at current parameters/frequency, offered no further complaints.

## 2022-02-16 PROCEDURE — 99442: CPT | Mod: 95

## 2022-03-14 LAB — SARS-COV-2 RNA SPEC QL NAA+PROBE: SIGNIFICANT CHANGE UP

## 2022-03-15 PROCEDURE — 90870 ELECTROCONVULSIVE THERAPY: CPT

## 2022-03-15 RX ORDER — MIDAZOLAM HYDROCHLORIDE 1 MG/ML
2 INJECTION, SOLUTION INTRAMUSCULAR; INTRAVENOUS ONCE
Refills: 0 | Status: DISCONTINUED | OUTPATIENT
Start: 2022-03-15 | End: 2022-03-15

## 2022-03-15 RX ORDER — FLUMAZENIL 0.1 MG/ML
0.2 VIAL (ML) INTRAVENOUS ONCE
Refills: 0 | Status: COMPLETED | OUTPATIENT
Start: 2022-03-15 | End: 2022-03-15

## 2022-03-15 RX ADMIN — Medication 0.2 MILLIGRAM(S): at 09:16

## 2022-03-15 RX ADMIN — MIDAZOLAM HYDROCHLORIDE 2 MILLIGRAM(S): 1 INJECTION, SOLUTION INTRAMUSCULAR; INTRAVENOUS at 09:22

## 2022-03-15 NOTE — ECT TREATMENT NOTE - NSECTCOMMENTS_PSY_ALL_CORE
Pt is very anxious before the treatment, but reports doing well through the monthly interval. Mood has been good and stable, sleep, appetite and energy levels at baseline.  We'll continue with weekly maintenance

## 2022-03-15 NOTE — ECT PRE-PROCEDURE CHECKLIST - NSECTCONSENT_PSY_ALL_CORE
ECT Consent RUL  11/9/21-22  Anesthesia expires 2/9/22/yes ECT Consent RUL  11/9/21-22  Anesthesia expires 6/15/22/yes

## 2022-03-24 PROCEDURE — 99442: CPT | Mod: 95

## 2022-03-28 ENCOUNTER — APPOINTMENT (OUTPATIENT)
Dept: GERIATRICS | Facility: CLINIC | Age: 87
End: 2022-03-28
Payer: MEDICARE

## 2022-03-28 ENCOUNTER — NON-APPOINTMENT (OUTPATIENT)
Age: 87
End: 2022-03-28

## 2022-03-28 VITALS
SYSTOLIC BLOOD PRESSURE: 124 MMHG | OXYGEN SATURATION: 98 % | DIASTOLIC BLOOD PRESSURE: 62 MMHG | BODY MASS INDEX: 28.34 KG/M2 | HEART RATE: 69 BPM | RESPIRATION RATE: 18 BRPM | TEMPERATURE: 97.4 F | WEIGHT: 151 LBS

## 2022-03-28 PROCEDURE — 99214 OFFICE O/P EST MOD 30 MIN: CPT | Mod: 25

## 2022-03-28 PROCEDURE — 93000 ELECTROCARDIOGRAM COMPLETE: CPT

## 2022-03-29 LAB
ALBUMIN SERPL ELPH-MCNC: 4.6 G/DL
ALP BLD-CCNC: 61 U/L
ALT SERPL-CCNC: 9 U/L
ANION GAP SERPL CALC-SCNC: 17 MMOL/L
AST SERPL-CCNC: 13 U/L
BASOPHILS # BLD AUTO: 0.09 K/UL
BASOPHILS NFR BLD AUTO: 0.7 %
BILIRUB SERPL-MCNC: 0.3 MG/DL
BUN SERPL-MCNC: 24 MG/DL
CALCIUM SERPL-MCNC: 10 MG/DL
CHLORIDE SERPL-SCNC: 96 MMOL/L
CO2 SERPL-SCNC: 21 MMOL/L
CREAT SERPL-MCNC: 1.45 MG/DL
EGFR: 35 ML/MIN/1.73M2
EOSINOPHIL # BLD AUTO: 0.13 K/UL
EOSINOPHIL NFR BLD AUTO: 1 %
GLUCOSE SERPL-MCNC: 132 MG/DL
HCT VFR BLD CALC: 40 %
HGB BLD-MCNC: 12.7 G/DL
IMM GRANULOCYTES NFR BLD AUTO: 0.6 %
LYMPHOCYTES # BLD AUTO: 1.83 K/UL
LYMPHOCYTES NFR BLD AUTO: 14.5 %
MAN DIFF?: NORMAL
MCHC RBC-ENTMCNC: 29.5 PG
MCHC RBC-ENTMCNC: 31.8 GM/DL
MCV RBC AUTO: 92.8 FL
MONOCYTES # BLD AUTO: 0.94 K/UL
MONOCYTES NFR BLD AUTO: 7.4 %
NEUTROPHILS # BLD AUTO: 9.59 K/UL
NEUTROPHILS NFR BLD AUTO: 75.8 %
PLATELET # BLD AUTO: 367 K/UL
POTASSIUM SERPL-SCNC: 4.7 MMOL/L
PROT SERPL-MCNC: 7.1 G/DL
RBC # BLD: 4.31 M/UL
RBC # FLD: 13 %
SODIUM SERPL-SCNC: 134 MMOL/L
WBC # FLD AUTO: 12.66 K/UL

## 2022-03-31 NOTE — REVIEW OF SYSTEMS
[Abdominal Pain] : no abdominal pain [Vomiting] : no vomiting [Constipation] : no constipation [Diarrhea] : no diarrhea [Negative] : Heme/Lymph

## 2022-03-31 NOTE — HISTORY OF PRESENT ILLNESS
[No falls in past year] : Patient reported no falls in the past year [FreeTextEntry1] : 87yo female with hx of anxiety and Depression, CKD, cognitive impairment, accompanied by her dtr for medical clearance for ECT. Pt is followed by psychiatry Dr. Fortunato Guillen. \par \par She has had good response in mood after ECT.  She is eating well. \par \par Pt is relatively independent with ADLs. She cooks, cleans, baths, uses the bathroom independently. Lives at home with her daughter, son-in-law and 2 twin granddaughters.\par \par urinary incontinence wears pad and pullup.  \par denies dysuria or vaginal pain\par \par denies falls, ab pain, constipation/diarrhea, or joint pain.\par \par Memory loss\par worse lately\par but still able to manage ADL's\par \par more sedentary lately\par but no falls

## 2022-03-31 NOTE — ASSESSMENT
[FreeTextEntry1] : c/w trazodone 150mg qhs for insomnia.\par \par \par FAX to ECT :  [phone]\par \par phone for ECT: [phone]\par

## 2022-04-11 LAB — SARS-COV-2 RNA SPEC QL NAA+PROBE: SIGNIFICANT CHANGE UP

## 2022-04-12 PROCEDURE — 90870 ELECTROCONVULSIVE THERAPY: CPT

## 2022-04-12 RX ORDER — FLUMAZENIL 0.1 MG/ML
0.2 VIAL (ML) INTRAVENOUS ONCE
Refills: 0 | Status: COMPLETED | OUTPATIENT
Start: 2022-04-12 | End: 2022-04-12

## 2022-04-12 RX ORDER — MIDAZOLAM HYDROCHLORIDE 1 MG/ML
2 INJECTION, SOLUTION INTRAMUSCULAR; INTRAVENOUS ONCE
Refills: 0 | Status: DISCONTINUED | OUTPATIENT
Start: 2022-04-12 | End: 2022-04-12

## 2022-04-12 RX ADMIN — Medication 0.2 MILLIGRAM(S): at 08:33

## 2022-04-12 RX ADMIN — MIDAZOLAM HYDROCHLORIDE 2 MILLIGRAM(S): 1 INJECTION, SOLUTION INTRAMUSCULAR; INTRAVENOUS at 08:36

## 2022-04-12 NOTE — ECT TREATMENT NOTE - NSECTCOMMENTS_PSY_ALL_CORE
Pt reports she is feeling well.  Denied active sx of depression.  somewhat anxious regarding treatment but calmed down after verbal reassurance.  Pt feels at psychiatric baseline, denied AE from ECT, amenable to continuing mECT at current parameters/frequency, offered no further complaints.

## 2022-05-03 PROCEDURE — 99442: CPT | Mod: 95

## 2022-05-13 PROCEDURE — 98968 PH1 ASSMT&MGMT NQHP 21-30: CPT | Mod: CR

## 2022-05-16 LAB — SARS-COV-2 RNA SPEC QL NAA+PROBE: SIGNIFICANT CHANGE UP

## 2022-05-17 RX ORDER — FLUMAZENIL 0.1 MG/ML
0.2 VIAL (ML) INTRAVENOUS ONCE
Refills: 0 | Status: COMPLETED | OUTPATIENT
Start: 2022-05-17 | End: 2022-05-17

## 2022-05-17 RX ORDER — MIDAZOLAM HYDROCHLORIDE 1 MG/ML
2 INJECTION, SOLUTION INTRAMUSCULAR; INTRAVENOUS ONCE
Refills: 0 | Status: DISCONTINUED | OUTPATIENT
Start: 2022-05-17 | End: 2022-05-17

## 2022-05-17 RX ADMIN — Medication 0.2 MILLIGRAM(S): at 08:55

## 2022-05-17 RX ADMIN — MIDAZOLAM HYDROCHLORIDE 2 MILLIGRAM(S): 1 INJECTION, SOLUTION INTRAMUSCULAR; INTRAVENOUS at 08:59

## 2022-05-17 NOTE — ECT TREATMENT NOTE - NSECTCOMMENTS_PSY_ALL_CORE
Pt reports she is doing well from a depression standpoint and denies active sx.  anxious regarding tx and endorsed dry mouth and nausea prior to session.  denied AE from ECT, amenable to continuing mECT at current parameters/interval, offered no further complaints. Pt reports she is doing well from a depression standpoint and denies active sx.  anxious regarding tx and endorsed dry mouth and nausea prior to session.  denied AE from ECT, amenable to continuing mECT, offered no further complaints.  outpt psychiatrist requests moving from monthly to q 5 wks 2/2 short term memory deficits.  reasonable given clinical stability

## 2022-06-20 LAB — SARS-COV-2 RNA SPEC QL NAA+PROBE: SIGNIFICANT CHANGE UP

## 2022-06-21 PROCEDURE — 90870 ELECTROCONVULSIVE THERAPY: CPT

## 2022-06-21 PROCEDURE — 99442: CPT | Mod: 95

## 2022-06-21 RX ORDER — MIDAZOLAM HYDROCHLORIDE 1 MG/ML
2 INJECTION, SOLUTION INTRAMUSCULAR; INTRAVENOUS ONCE
Refills: 0 | Status: DISCONTINUED | OUTPATIENT
Start: 2022-06-21 | End: 2022-06-21

## 2022-06-21 RX ORDER — FLUMAZENIL 0.1 MG/ML
0.2 VIAL (ML) INTRAVENOUS ONCE
Refills: 0 | Status: COMPLETED | OUTPATIENT
Start: 2022-06-21 | End: 2022-06-21

## 2022-06-21 RX ADMIN — MIDAZOLAM HYDROCHLORIDE 2 MILLIGRAM(S): 1 INJECTION, SOLUTION INTRAMUSCULAR; INTRAVENOUS at 08:38

## 2022-06-21 RX ADMIN — Medication 0.2 MILLIGRAM(S): at 08:34

## 2022-06-21 NOTE — ECT TREATMENT NOTE - NSECTCOMMENTS_PSY_ALL_CORE
Pt reports that she is doing well. denied active sx of depression and feels as though she is at her psychiatric baseline.  memory issues endorsed last session have improved and now pt denies AE.  amenable to continuing mECT at current parameters/schedule, offered no further complaints.

## 2022-06-24 ENCOUNTER — APPOINTMENT (OUTPATIENT)
Dept: GERIATRICS | Facility: CLINIC | Age: 87
End: 2022-06-24
Payer: MEDICARE

## 2022-06-24 ENCOUNTER — NON-APPOINTMENT (OUTPATIENT)
Age: 87
End: 2022-06-24

## 2022-06-24 VITALS
TEMPERATURE: 97.6 F | WEIGHT: 153 LBS | DIASTOLIC BLOOD PRESSURE: 60 MMHG | OXYGEN SATURATION: 95 % | HEART RATE: 72 BPM | RESPIRATION RATE: 16 BRPM | BODY MASS INDEX: 28.72 KG/M2 | SYSTOLIC BLOOD PRESSURE: 118 MMHG

## 2022-06-24 PROCEDURE — 93000 ELECTROCARDIOGRAM COMPLETE: CPT

## 2022-06-24 PROCEDURE — 99214 OFFICE O/P EST MOD 30 MIN: CPT | Mod: 25

## 2022-06-24 RX ORDER — CLONAZEPAM 0.5 MG/1
0.5 TABLET ORAL
Qty: 60 | Refills: 0 | Status: ACTIVE | COMMUNITY
Start: 2019-05-01

## 2022-06-29 LAB
ALBUMIN SERPL ELPH-MCNC: 4.5 G/DL
ALP BLD-CCNC: 57 U/L
ALT SERPL-CCNC: 7 U/L
ANION GAP SERPL CALC-SCNC: 16 MMOL/L
AST SERPL-CCNC: 13 U/L
BASOPHILS # BLD AUTO: 0.06 K/UL
BASOPHILS NFR BLD AUTO: 0.4 %
BILIRUB SERPL-MCNC: 0.4 MG/DL
BUN SERPL-MCNC: 24 MG/DL
CALCIUM SERPL-MCNC: 9.6 MG/DL
CHLORIDE SERPL-SCNC: 95 MMOL/L
CO2 SERPL-SCNC: 22 MMOL/L
CREAT SERPL-MCNC: 1.47 MG/DL
EGFR: 35 ML/MIN/1.73M2
EOSINOPHIL # BLD AUTO: 0.02 K/UL
EOSINOPHIL NFR BLD AUTO: 0.1 %
GLUCOSE SERPL-MCNC: 118 MG/DL
HCT VFR BLD CALC: 38.5 %
HGB BLD-MCNC: 12.6 G/DL
IMM GRANULOCYTES NFR BLD AUTO: 0.7 %
LYMPHOCYTES # BLD AUTO: 1.62 K/UL
LYMPHOCYTES NFR BLD AUTO: 11.2 %
MAN DIFF?: NORMAL
MCHC RBC-ENTMCNC: 29 PG
MCHC RBC-ENTMCNC: 32.7 GM/DL
MCV RBC AUTO: 88.5 FL
MONOCYTES # BLD AUTO: 0.79 K/UL
MONOCYTES NFR BLD AUTO: 5.5 %
NEUTROPHILS # BLD AUTO: 11.88 K/UL
NEUTROPHILS NFR BLD AUTO: 82.1 %
PLATELET # BLD AUTO: 326 K/UL
POTASSIUM SERPL-SCNC: 4.8 MMOL/L
PROT SERPL-MCNC: 6.5 G/DL
RBC # BLD: 4.35 M/UL
RBC # FLD: 13.5 %
SODIUM SERPL-SCNC: 133 MMOL/L
TSH SERPL-ACNC: 2.95 UIU/ML
WBC # FLD AUTO: 14.47 K/UL

## 2022-06-29 NOTE — ASSESSMENT
[FreeTextEntry1] : c/w trazodone 150mg qhs for insomnia.\par mild hyponatremia: of 133.  no contraindication to proceed with planned ECT.  will repeat with next routine labs\par \par \par FAX to ECT :  [phone]\par \par phone for ECT: [phone]\par

## 2022-07-18 ENCOUNTER — RX RENEWAL (OUTPATIENT)
Age: 87
End: 2022-07-18

## 2022-07-25 LAB — SARS-COV-2 RNA SPEC QL NAA+PROBE: SIGNIFICANT CHANGE UP

## 2022-07-26 DIAGNOSIS — F41.1 GENERALIZED ANXIETY DISORDER: ICD-10-CM

## 2022-07-26 DIAGNOSIS — I10 ESSENTIAL (PRIMARY) HYPERTENSION: ICD-10-CM

## 2022-07-26 PROCEDURE — 90870 ELECTROCONVULSIVE THERAPY: CPT

## 2022-07-26 RX ORDER — FLUMAZENIL 0.1 MG/ML
0.2 VIAL (ML) INTRAVENOUS ONCE
Refills: 0 | Status: COMPLETED | OUTPATIENT
Start: 2022-07-26 | End: 2022-07-26

## 2022-07-26 RX ORDER — MIDAZOLAM HYDROCHLORIDE 1 MG/ML
2 INJECTION, SOLUTION INTRAMUSCULAR; INTRAVENOUS ONCE
Refills: 0 | Status: DISCONTINUED | OUTPATIENT
Start: 2022-07-26 | End: 2022-07-26

## 2022-07-26 RX ADMIN — MIDAZOLAM HYDROCHLORIDE 2 MILLIGRAM(S): 1 INJECTION, SOLUTION INTRAMUSCULAR; INTRAVENOUS at 09:00

## 2022-07-26 RX ADMIN — Medication 0.2 MILLIGRAM(S): at 08:55

## 2022-07-26 NOTE — ECT TREATMENT NOTE - NSECTCOMMENTS_PSY_ALL_CORE
Pt reports she feels well from a depression perspective.  +anxiety regarding tx but otherwise appears at her psychiatric baseline.  mood is "fine" and affect is anxious, pt denies AE from ECT, amenable to continuing mECT at current parameters/interval, offered no further complaints.

## 2022-07-29 PROCEDURE — 99442: CPT | Mod: 95

## 2022-08-25 PROCEDURE — 99442: CPT | Mod: 95

## 2022-08-29 LAB — SARS-COV-2 RNA SPEC QL NAA+PROBE: SIGNIFICANT CHANGE UP

## 2022-08-30 PROCEDURE — 90870 ELECTROCONVULSIVE THERAPY: CPT

## 2022-08-30 RX ORDER — MIDAZOLAM HYDROCHLORIDE 1 MG/ML
2 INJECTION, SOLUTION INTRAMUSCULAR; INTRAVENOUS ONCE
Refills: 0 | Status: DISCONTINUED | OUTPATIENT
Start: 2022-08-30 | End: 2022-08-30

## 2022-08-30 RX ORDER — FLUMAZENIL 0.1 MG/ML
0.2 VIAL (ML) INTRAVENOUS ONCE
Refills: 0 | Status: COMPLETED | OUTPATIENT
Start: 2022-08-30 | End: 2022-08-30

## 2022-08-30 RX ADMIN — MIDAZOLAM HYDROCHLORIDE 2 MILLIGRAM(S): 1 INJECTION, SOLUTION INTRAMUSCULAR; INTRAVENOUS at 08:43

## 2022-08-30 RX ADMIN — Medication 0.2 MILLIGRAM(S): at 08:38

## 2022-08-30 NOTE — ECT TREATMENT NOTE - NSECTCOMMENTS_PSY_ALL_CORE
Pt anxious prior to treatment endorsing nausea and asking repeatedly for water (baseline for pt).  however, she reports that her mood is good and denied active sx of depression.  mood is "not depressed" affect is anxious (also baseline for pt).  Pt denied AE from ECT, amenable to continuing mECT at current parameters/interval, offered no further complaints.

## 2022-09-02 ENCOUNTER — APPOINTMENT (OUTPATIENT)
Dept: GERIATRICS | Facility: CLINIC | Age: 87
End: 2022-09-02

## 2022-09-02 ENCOUNTER — NON-APPOINTMENT (OUTPATIENT)
Age: 87
End: 2022-09-02

## 2022-09-02 VITALS
WEIGHT: 156.13 LBS | BODY MASS INDEX: 29.48 KG/M2 | HEART RATE: 68 BPM | DIASTOLIC BLOOD PRESSURE: 62 MMHG | TEMPERATURE: 97.2 F | HEIGHT: 61 IN | SYSTOLIC BLOOD PRESSURE: 118 MMHG | OXYGEN SATURATION: 96 % | RESPIRATION RATE: 16 BRPM

## 2022-09-02 DIAGNOSIS — R32 UNSPECIFIED URINARY INCONTINENCE: ICD-10-CM

## 2022-09-02 DIAGNOSIS — E87.1 HYPO-OSMOLALITY AND HYPONATREMIA: ICD-10-CM

## 2022-09-02 PROCEDURE — 99214 OFFICE O/P EST MOD 30 MIN: CPT | Mod: 25

## 2022-09-02 PROCEDURE — 93000 ELECTROCARDIOGRAM COMPLETE: CPT | Mod: GC

## 2022-09-02 NOTE — PHYSICAL EXAM
[Alert] : alert [No Acute Distress] : in no acute distress [Sclera] : the sclera and conjunctiva were normal [EOMI] : extraocular movements were intact [Normal Outer Ear/Nose] : the ears and nose were normal in appearance [Normal Appearance] : the appearance of the neck was normal [Supple] : the neck was supple [No Respiratory Distress] : no respiratory distress [No Acc Muscle Use] : no accessory muscle use [Respiration, Rhythm And Depth] : normal respiratory rhythm and effort [Auscultation Breath Sounds / Voice Sounds] : lungs were clear to auscultation bilaterally [Normal S1, S2] : normal S1 and S2 [Murmurs] : no murmurs [Heart Rate And Rhythm] : heart rate was normal and rhythm regular [Edema] : edema was not present [Bowel Sounds] : normal bowel sounds [Abdomen Tenderness] : non-tender [Abdomen Soft] : soft [Cervical Lymph Nodes Enlarged Posterior Bilaterally] : posterior cervical [Supraclavicular Lymph Nodes Enlarged Bilaterally] : supraclavicular [Cervical Lymph Nodes Enlarged Anterior Bilaterally] : anterior cervical, supraclavicular [No Spinal Tenderness] : no spinal tenderness [Normal Gait] : normal gait [No Clubbing, Cyanosis] : no clubbing or cyanosis of the fingernails [Involuntary Movements] : no involuntary movements were seen [Motor Tone] : muscle strength and tone were normal [Normal Color / Pigmentation] : normal skin color and pigmentation [No Focal Deficits] : no focal deficits [Normal Affect] : the affect was normal [Normal Insight/Judgment] : insight and judgment were intact [Normal Mood] : the mood was normal

## 2022-09-06 PROBLEM — E87.1 HYPONATREMIA: Status: ACTIVE | Noted: 2020-09-02

## 2022-09-06 LAB
ALBUMIN SERPL ELPH-MCNC: 4.5 G/DL
ALP BLD-CCNC: 61 U/L
ALT SERPL-CCNC: 10 U/L
ANION GAP SERPL CALC-SCNC: 16 MMOL/L
AST SERPL-CCNC: 16 U/L
BASOPHILS # BLD AUTO: 0.06 K/UL
BASOPHILS NFR BLD AUTO: 0.5 %
BILIRUB SERPL-MCNC: 0.4 MG/DL
BUN SERPL-MCNC: 20 MG/DL
CALCIUM SERPL-MCNC: 9.7 MG/DL
CHLORIDE SERPL-SCNC: 94 MMOL/L
CO2 SERPL-SCNC: 22 MMOL/L
CREAT SERPL-MCNC: 1.49 MG/DL
EGFR: 34 ML/MIN/1.73M2
EOSINOPHIL # BLD AUTO: 0.07 K/UL
EOSINOPHIL NFR BLD AUTO: 0.6 %
GLUCOSE SERPL-MCNC: 107 MG/DL
HCT VFR BLD CALC: 36.6 %
HGB BLD-MCNC: 12.1 G/DL
IMM GRANULOCYTES NFR BLD AUTO: 0.7 %
LYMPHOCYTES # BLD AUTO: 1.31 K/UL
LYMPHOCYTES NFR BLD AUTO: 10.4 %
MAN DIFF?: NORMAL
MCHC RBC-ENTMCNC: 29 PG
MCHC RBC-ENTMCNC: 33.1 GM/DL
MCV RBC AUTO: 87.8 FL
MONOCYTES # BLD AUTO: 0.86 K/UL
MONOCYTES NFR BLD AUTO: 6.8 %
NEUTROPHILS # BLD AUTO: 10.23 K/UL
NEUTROPHILS NFR BLD AUTO: 81 %
PLATELET # BLD AUTO: 334 K/UL
POTASSIUM SERPL-SCNC: 5 MMOL/L
PROT SERPL-MCNC: 6.6 G/DL
RBC # BLD: 4.17 M/UL
RBC # FLD: 13.2 %
SODIUM SERPL-SCNC: 132 MMOL/L
TSH SERPL-ACNC: 1.75 UIU/ML
WBC # FLD AUTO: 12.62 K/UL

## 2022-09-06 NOTE — END OF VISIT
[] : Fellow [FreeTextEntry3] : 87yof with hypothyroidism and anxiety and depresion for ECT clearance- changes made above- plan as documented

## 2022-09-06 NOTE — REASON FOR VISIT
[Follow-Up] : a follow-up visit [Family Member] : family member [FreeTextEntry1] : ECT evaluation [FreeTextEntry2] : Daughter

## 2022-09-06 NOTE — REVIEW OF SYSTEMS
[Incontinence] : incontinence [Fever] : no fever [Chills] : no chills [Feeling Poorly] : not feeling poorly [Feeling Tired] : not feeling tired [Recent Weight Loss (___ Lbs)] : no recent weight loss [Eye Pain] : no eye pain [Earache] : no earache [Nosebleeds] : no nosebleeds [Heart Rate Is Slow] : the heart rate was not slow [Heart Rate Is Fast] : the heart rate was not fast [Chest Pain] : no chest pain [Palpitations] : no palpitations [Leg Claudication] : no intermittent leg claudication [Lower Ext Edema] : no lower extremity edema [Shortness Of Breath] : no shortness of breath [Wheezing] : no wheezing [Cough] : no cough [SOB on Exertion] : no shortness of breath during exertion [Orthopnea] : no orthopnea [Abdominal Pain] : no abdominal pain [Vomiting] : no vomiting [Dysuria] : no dysuria [Limb Swelling] : no limb swelling [Itching] : no itching [Dizziness] : no dizziness [Fainting] : no fainting [Muscle Weakness] : no muscle weakness

## 2022-09-06 NOTE — HISTORY OF PRESENT ILLNESS
[No falls in past year] : Patient reported no falls in the past year [Patient is independent with] : bathing [Independent] : transferring/mobility [] : using the telephone [FreeTextEntry1] : 86yo F with PMHx of Anxiety and Depression, CKD, cognitive impairment, accompanied by her dtr for medical clearance for ECT. Pt is followed by psychiatry Dr. Fortunato Guillen. \par \par She has had good response in mood with ECT. Last session was three days ago - next visit in Oct. She is eating well. \par \par Pt is relatively independent with ADLs. She cooks, cleans, baths, uses the bathroom independently. Lives at home with her daughter, son-in-law and 2 twin granddaughters.\par \par Has urinary incontinence wears pad and pullup. \par Denies dysuria \par \par Denies falls, constipation/diarrhea, or joint pain.\par \par Memory loss- worse lately\par but still able to manage ADL's\par \par According to daughter, the patient is "status quo." No acute complaints today. \par eating well\par  [FreeTextEntry8] : Incontinent

## 2022-09-29 PROCEDURE — 99442: CPT | Mod: 95

## 2022-10-03 LAB — SARS-COV-2 RNA SPEC QL NAA+PROBE: SIGNIFICANT CHANGE UP

## 2022-10-04 PROCEDURE — 90870 ELECTROCONVULSIVE THERAPY: CPT

## 2022-10-04 RX ORDER — MIDAZOLAM HYDROCHLORIDE 1 MG/ML
2 INJECTION, SOLUTION INTRAMUSCULAR; INTRAVENOUS ONCE
Refills: 0 | Status: DISCONTINUED | OUTPATIENT
Start: 2022-10-04 | End: 2022-10-04

## 2022-10-04 RX ORDER — FLUMAZENIL 0.1 MG/ML
0.2 VIAL (ML) INTRAVENOUS ONCE
Refills: 0 | Status: COMPLETED | OUTPATIENT
Start: 2022-10-04 | End: 2022-10-04

## 2022-10-04 RX ADMIN — Medication 0.2 MILLIGRAM(S): at 08:52

## 2022-10-04 RX ADMIN — MIDAZOLAM HYDROCHLORIDE 2 MILLIGRAM(S): 1 INJECTION, SOLUTION INTRAMUSCULAR; INTRAVENOUS at 08:53

## 2022-10-04 NOTE — ECT TREATMENT NOTE - NSECTCOMMENTS_PSY_ALL_CORE
Pt particularly anxious this AM.  repeatedly asked for a glass of water.  states that her mood is good and denied AE from ECT but unable to participate in remainder of depression ROS due to anxiety w/ exception of being able to deny SI.  pt appears at baseline so will continue current course

## 2022-11-02 PROCEDURE — 99442: CPT | Mod: 95

## 2022-11-07 LAB — SARS-COV-2 RNA SPEC QL NAA+PROBE: SIGNIFICANT CHANGE UP

## 2022-11-08 PROCEDURE — 90870 ELECTROCONVULSIVE THERAPY: CPT

## 2022-11-08 RX ORDER — FLUMAZENIL 0.1 MG/ML
0.2 VIAL (ML) INTRAVENOUS ONCE
Refills: 0 | Status: COMPLETED | OUTPATIENT
Start: 2022-11-08 | End: 2022-11-08

## 2022-11-08 RX ORDER — MIDAZOLAM HYDROCHLORIDE 1 MG/ML
2 INJECTION, SOLUTION INTRAMUSCULAR; INTRAVENOUS ONCE
Refills: 0 | Status: DISCONTINUED | OUTPATIENT
Start: 2022-11-08 | End: 2022-11-08

## 2022-11-08 RX ADMIN — Medication 0.2 MILLIGRAM(S): at 09:21

## 2022-11-08 RX ADMIN — MIDAZOLAM HYDROCHLORIDE 2 MILLIGRAM(S): 1 INJECTION, SOLUTION INTRAMUSCULAR; INTRAVENOUS at 09:25

## 2022-11-08 NOTE — ECT TREATMENT NOTE - NSECTCOMMENTS_PSY_ALL_CORE
Pt presents very anxious fatoumata nauseous asking for water, which is her regular presentation. She  states that her mood is good and denied any depession during the 5 week interval. Denies SI    We'll continue with maintenance q5 weeks

## 2022-11-25 ENCOUNTER — LABORATORY RESULT (OUTPATIENT)
Age: 87
End: 2022-11-25

## 2022-11-25 ENCOUNTER — NON-APPOINTMENT (OUTPATIENT)
Age: 87
End: 2022-11-25

## 2022-11-25 ENCOUNTER — APPOINTMENT (OUTPATIENT)
Dept: GERIATRICS | Facility: CLINIC | Age: 87
End: 2022-11-25

## 2022-11-25 VITALS
BODY MASS INDEX: 28.01 KG/M2 | RESPIRATION RATE: 16 BRPM | SYSTOLIC BLOOD PRESSURE: 122 MMHG | TEMPERATURE: 97.2 F | OXYGEN SATURATION: 95 % | HEART RATE: 80 BPM | HEIGHT: 61 IN | WEIGHT: 148.38 LBS | DIASTOLIC BLOOD PRESSURE: 68 MMHG

## 2022-11-25 DIAGNOSIS — D72.829 ELEVATED WHITE BLOOD CELL COUNT, UNSPECIFIED: ICD-10-CM

## 2022-11-25 PROCEDURE — G0008: CPT

## 2022-11-25 PROCEDURE — 90662 IIV NO PRSV INCREASED AG IM: CPT

## 2022-11-25 PROCEDURE — 93000 ELECTROCARDIOGRAM COMPLETE: CPT

## 2022-11-25 PROCEDURE — 99214 OFFICE O/P EST MOD 30 MIN: CPT | Mod: 25

## 2022-11-25 NOTE — PHYSICAL EXAM
[Alert] : alert [Sclera] : the sclera and conjunctiva were normal [EOMI] : extraocular movements were intact [Normal Outer Ear/Nose] : the ears and nose were normal in appearance [Normal Appearance] : the appearance of the neck was normal [Supple] : the neck was supple [No Respiratory Distress] : no respiratory distress [No Acc Muscle Use] : no accessory muscle use [Respiration, Rhythm And Depth] : normal respiratory rhythm and effort [Auscultation Breath Sounds / Voice Sounds] : lungs were clear to auscultation bilaterally [Normal S1, S2] : normal S1 and S2 [Murmurs] : no murmurs [Heart Rate And Rhythm] : heart rate was normal and rhythm regular [Edema] : edema was not present [Bowel Sounds] : normal bowel sounds [Abdomen Tenderness] : non-tender [Abdomen Soft] : soft [Cervical Lymph Nodes Enlarged Posterior Bilaterally] : posterior cervical [Supraclavicular Lymph Nodes Enlarged Bilaterally] : supraclavicular [Cervical Lymph Nodes Enlarged Anterior Bilaterally] : anterior cervical, supraclavicular [No Spinal Tenderness] : no spinal tenderness [Normal Gait] : normal gait [No Clubbing, Cyanosis] : no clubbing or cyanosis of the fingernails [Involuntary Movements] : no involuntary movements were seen [Motor Tone] : muscle strength and tone were normal [Normal Color / Pigmentation] : normal skin color and pigmentation [No Focal Deficits] : no focal deficits [Normal Affect] : the affect was normal [Normal Insight/Judgment] : insight and judgment were intact [Normal Mood] : the mood was normal

## 2022-11-28 PROBLEM — D72.829 LEUKOCYTOSIS: Status: ACTIVE | Noted: 2017-06-14

## 2022-11-28 NOTE — HISTORY OF PRESENT ILLNESS
[No falls in past year] : Patient reported no falls in the past year [Patient is independent with] : bathing [Independent] : transferring/mobility [] : using the telephone [FreeTextEntry1] : 88yo F with PMHx of Anxiety and Depression, CKD, cognitive impairment, accompanied by her dtr for medical clearance for ECT. Pt is followed by psychiatry Dr. Fortunato Guillen. \par \par She has had good response in mood with ECT. \par \par Pt is relatively independent with ADLs. She cooks, cleans, baths, uses the bathroom independently. Lives at home with her daughter, son-in-law and 2 twin granddaughters.\par \par Has urinary incontinence wears pad and pullup. \par Denies dysuria \par \par Denies falls, constipation/diarrhea, or joint pain.\par \par Memory loss- stable\par but still able to manage ADL's\par \par \par  [FreeTextEntry8] : Incontinent

## 2022-11-28 NOTE — ASSESSMENT
[FreeTextEntry1] : Repeated EKG today due to artifact and poor baseline -\par repeat EKG remains with artifact- NSR with artifact and rate variation, rate of 87, no ischemic changes.\par

## 2022-11-29 ENCOUNTER — RX RENEWAL (OUTPATIENT)
Age: 87
End: 2022-11-29

## 2022-12-05 PROCEDURE — 99442: CPT | Mod: 95

## 2022-12-12 LAB — SARS-COV-2 RNA SPEC QL NAA+PROBE: SIGNIFICANT CHANGE UP

## 2022-12-13 PROCEDURE — 90870 ELECTROCONVULSIVE THERAPY: CPT

## 2022-12-13 RX ORDER — MIDAZOLAM HYDROCHLORIDE 1 MG/ML
2 INJECTION, SOLUTION INTRAMUSCULAR; INTRAVENOUS ONCE
Refills: 0 | Status: DISCONTINUED | OUTPATIENT
Start: 2022-12-13 | End: 2022-12-13

## 2022-12-13 RX ORDER — FLUMAZENIL 0.1 MG/ML
0.2 VIAL (ML) INTRAVENOUS ONCE
Refills: 0 | Status: COMPLETED | OUTPATIENT
Start: 2022-12-13 | End: 2022-12-13

## 2022-12-13 RX ADMIN — Medication 0.2 MILLIGRAM(S): at 08:55

## 2022-12-13 RX ADMIN — MIDAZOLAM HYDROCHLORIDE 2 MILLIGRAM(S): 1 INJECTION, SOLUTION INTRAMUSCULAR; INTRAVENOUS at 08:59

## 2022-12-13 NOTE — ECT TREATMENT NOTE - NSECTCOMMENTS_PSY_ALL_CORE
Pt intermittently too anxious to participate in clinical interview.  was able to deny depression but states that she is "not good."  unable to elaborate.  repeatedly asked for water.  able to sign consent and verbalize desire to continue mECT.

## 2023-01-17 LAB — SARS-COV-2 RNA SPEC QL NAA+PROBE: DETECTED

## 2023-02-01 PROCEDURE — 90870 ELECTROCONVULSIVE THERAPY: CPT

## 2023-02-01 RX ORDER — MIDAZOLAM HYDROCHLORIDE 1 MG/ML
2 INJECTION, SOLUTION INTRAMUSCULAR; INTRAVENOUS ONCE
Refills: 0 | Status: DISCONTINUED | OUTPATIENT
Start: 2023-02-01 | End: 2023-02-01

## 2023-02-01 RX ORDER — FLUMAZENIL 0.1 MG/ML
0.2 VIAL (ML) INTRAVENOUS ONCE
Refills: 0 | Status: COMPLETED | OUTPATIENT
Start: 2023-02-01 | End: 2023-02-01

## 2023-02-01 RX ADMIN — Medication 0.2 MILLIGRAM(S): at 08:18

## 2023-02-01 RX ADMIN — MIDAZOLAM HYDROCHLORIDE 2 MILLIGRAM(S): 1 INJECTION, SOLUTION INTRAMUSCULAR; INTRAVENOUS at 08:25

## 2023-02-01 NOTE — ECT TREATMENT NOTE - NSECTCOMMENTS_PSY_ALL_CORE
Patient presented with anxiety this morning, similar to last several visits. Per daughter Karla the patient has remained at baseline despite extension of the interval due to a COVID-19 infection. We will continue maintenance ECT to help reduce the chances of relapse.

## 2023-02-10 ENCOUNTER — APPOINTMENT (OUTPATIENT)
Dept: GERIATRICS | Facility: CLINIC | Age: 88
End: 2023-02-10
Payer: MEDICARE

## 2023-02-10 ENCOUNTER — NON-APPOINTMENT (OUTPATIENT)
Age: 88
End: 2023-02-10

## 2023-02-10 VITALS
BODY MASS INDEX: 26.53 KG/M2 | OXYGEN SATURATION: 96 % | WEIGHT: 140.5 LBS | HEART RATE: 69 BPM | TEMPERATURE: 97.9 F | SYSTOLIC BLOOD PRESSURE: 146 MMHG | RESPIRATION RATE: 16 BRPM | DIASTOLIC BLOOD PRESSURE: 76 MMHG | HEIGHT: 61 IN

## 2023-02-10 DIAGNOSIS — F51.05 INSOMNIA DUE TO OTHER MENTAL DISORDER: ICD-10-CM

## 2023-02-10 PROCEDURE — 99214 OFFICE O/P EST MOD 30 MIN: CPT | Mod: 25

## 2023-02-10 PROCEDURE — 93000 ELECTROCARDIOGRAM COMPLETE: CPT

## 2023-02-13 LAB
ALBUMIN SERPL ELPH-MCNC: 4.5 G/DL
ALP BLD-CCNC: 59 U/L
ALT SERPL-CCNC: 9 U/L
ANION GAP SERPL CALC-SCNC: 18 MMOL/L
AST SERPL-CCNC: 12 U/L
BASOPHILS # BLD AUTO: 0.08 K/UL
BASOPHILS NFR BLD AUTO: 0.7 %
BILIRUB SERPL-MCNC: 0.2 MG/DL
BUN SERPL-MCNC: 23 MG/DL
CALCIUM SERPL-MCNC: 10.3 MG/DL
CHLORIDE SERPL-SCNC: 97 MMOL/L
CO2 SERPL-SCNC: 19 MMOL/L
CREAT SERPL-MCNC: 1.73 MG/DL
EGFR: 28 ML/MIN/1.73M2
EOSINOPHIL # BLD AUTO: 0.15 K/UL
EOSINOPHIL NFR BLD AUTO: 1.3 %
GLUCOSE SERPL-MCNC: 160 MG/DL
HCT VFR BLD CALC: 36.3 %
HGB BLD-MCNC: 11.9 G/DL
IMM GRANULOCYTES NFR BLD AUTO: 0.7 %
LYMPHOCYTES # BLD AUTO: 1.22 K/UL
LYMPHOCYTES NFR BLD AUTO: 10.4 %
MAN DIFF?: NORMAL
MCHC RBC-ENTMCNC: 30.3 PG
MCHC RBC-ENTMCNC: 32.8 GM/DL
MCV RBC AUTO: 92.4 FL
MONOCYTES # BLD AUTO: 0.86 K/UL
MONOCYTES NFR BLD AUTO: 7.3 %
NEUTROPHILS # BLD AUTO: 9.32 K/UL
NEUTROPHILS NFR BLD AUTO: 79.6 %
PLATELET # BLD AUTO: 331 K/UL
POTASSIUM SERPL-SCNC: 4.4 MMOL/L
PROT SERPL-MCNC: 6.3 G/DL
RBC # BLD: 3.93 M/UL
RBC # FLD: 13.8 %
SODIUM SERPL-SCNC: 133 MMOL/L
WBC # FLD AUTO: 11.71 K/UL

## 2023-02-13 PROCEDURE — 99442: CPT | Mod: 95

## 2023-02-13 NOTE — HISTORY OF PRESENT ILLNESS
[No falls in past year] : Patient reported no falls in the past year [Patient is independent with] : bathing [Independent] : transferring/mobility [] : using the telephone [FreeTextEntry1] : 88yo F with PMHx of Anxiety and Depression, CKD, cognitive impairment, accompanied by her dtr for medical clearance for ECT. Pt is followed by psychiatry Dr. Fortunato Guillen. \par \par She has had good response in mood with ECT. \par \par she had covid in december with minimal symtpoms, but with continued fatigue, now has improved.\par \par Pt is relatively independent with ADLs. She cooks, cleans, baths, uses the bathroom independently. Lives at home with her daughter, son-in-law and 2 twin granddaughters.\par \par Has urinary incontinence wears pad and pullup. \par Denies dysuria \par \par Denies falls, constipation/diarrhea, or joint pain.\par \par Memory loss- stable\par but still able to manage ADL's\par \par \par  [FreeTextEntry8] : Incontinent

## 2023-03-08 PROCEDURE — 90870 ELECTROCONVULSIVE THERAPY: CPT

## 2023-03-08 RX ORDER — FLUMAZENIL 0.1 MG/ML
0.2 VIAL (ML) INTRAVENOUS ONCE
Refills: 0 | Status: COMPLETED | OUTPATIENT
Start: 2023-03-08 | End: 2023-03-08

## 2023-03-08 RX ORDER — MIDAZOLAM HYDROCHLORIDE 1 MG/ML
2 INJECTION, SOLUTION INTRAMUSCULAR; INTRAVENOUS ONCE
Refills: 0 | Status: DISCONTINUED | OUTPATIENT
Start: 2023-03-08 | End: 2023-03-08

## 2023-03-08 RX ADMIN — MIDAZOLAM HYDROCHLORIDE 2 MILLIGRAM(S): 1 INJECTION, SOLUTION INTRAMUSCULAR; INTRAVENOUS at 09:23

## 2023-03-08 RX ADMIN — Medication 0.2 MILLIGRAM(S): at 09:19

## 2023-03-08 NOTE — ECT PRE-PROCEDURE CHECKLIST - NS PREOP CHK TEST_COVID_DT_GEN_ALL_CORE
06-Dec-2021 10:18
12-Jul-2021 10:18
16-May-2022
07-Feb-2022
14-Mar-2022
29-Aug-2022
13-Sep-2021 10:18
14-Jun-2021 09:24
20-Jun-2022
03-Oct-2022
25-Jul-2022
09-Aug-2021 10:18
11-Oct-2021 10:18
19-Apr-2021 10:12
11-Apr-2022
07-Nov-2022
13-Dec-2022
03-Dec-2021 10:18
07-Jan-2023
22-Mar-2021
08-Nov-2021 10:18
13-May-2021 10:12
07-Jan-2023

## 2023-03-08 NOTE — ECT PRE-PROCEDURE CHECKLIST - NS PREOP CHK TEST_COVID RESULT_GEN_ALL_CORE
Negative
Positive
Negative
Positive

## 2023-03-08 NOTE — ECT TREATMENT NOTE - NSECTCOMMENTS_PSY_ALL_CORE
Pt was extremely anxious, unable to answer most questions. Daughter states that she is stable,  no worsening of mood during the period. We will continue maintenance ECT to help reduce the chances of relapse.

## 2023-03-14 PROCEDURE — 99442: CPT | Mod: 95

## 2023-04-11 PROCEDURE — 90870 ELECTROCONVULSIVE THERAPY: CPT

## 2023-04-11 RX ORDER — FLUMAZENIL 0.1 MG/ML
0.2 VIAL (ML) INTRAVENOUS ONCE
Refills: 0 | Status: COMPLETED | OUTPATIENT
Start: 2023-04-11 | End: 2023-04-11

## 2023-04-11 RX ORDER — MIDAZOLAM HYDROCHLORIDE 1 MG/ML
2 INJECTION, SOLUTION INTRAMUSCULAR; INTRAVENOUS ONCE
Refills: 0 | Status: DISCONTINUED | OUTPATIENT
Start: 2023-04-11 | End: 2023-04-11

## 2023-04-11 RX ADMIN — Medication 0.2 MILLIGRAM(S): at 08:44

## 2023-04-11 RX ADMIN — MIDAZOLAM HYDROCHLORIDE 2 MILLIGRAM(S): 1 INJECTION, SOLUTION INTRAMUSCULAR; INTRAVENOUS at 08:47

## 2023-04-11 NOTE — ECT PRE-PROCEDURE CHECKLIST - SELECT TESTS ORDERED
COVID-19
COVID-19
Results in MD note/COVID-19
Covid not detected 12/28/20/COVID
COVID-19
COVID-19
covid neg 2/22/21/Results in MD note/COVID
COVID-19
Results in MD note
Covid not detected 1/25/21/COVID

## 2023-04-11 NOTE — ECT TREATMENT NOTE - NSECTCOMMENTS_PSY_ALL_CORE
Patient anxious prior to treatment, stable through the entire treatment interval without recurrence of presenting symptoms. Mood, energy, sleep, and appetite were within normal limits. Daughter confirmed. We will continue maintenance ECT to help reduce the chances of relapse. Family interested in discussing discontinuation with primary psychiatrist Dr. Guillen next week.

## 2023-04-17 ENCOUNTER — RX RENEWAL (OUTPATIENT)
Age: 88
End: 2023-04-17

## 2023-04-18 PROCEDURE — 99214 OFFICE O/P EST MOD 30 MIN: CPT

## 2023-05-03 ENCOUNTER — NON-APPOINTMENT (OUTPATIENT)
Age: 88
End: 2023-05-03

## 2023-05-03 ENCOUNTER — APPOINTMENT (OUTPATIENT)
Dept: GERIATRICS | Facility: CLINIC | Age: 88
End: 2023-05-03
Payer: MEDICARE

## 2023-05-03 VITALS
RESPIRATION RATE: 18 BRPM | TEMPERATURE: 97 F | OXYGEN SATURATION: 95 % | WEIGHT: 128 LBS | HEIGHT: 61 IN | DIASTOLIC BLOOD PRESSURE: 75 MMHG | SYSTOLIC BLOOD PRESSURE: 130 MMHG | HEART RATE: 67 BPM | BODY MASS INDEX: 24.17 KG/M2

## 2023-05-03 DIAGNOSIS — R73.9 HYPERGLYCEMIA, UNSPECIFIED: ICD-10-CM

## 2023-05-03 DIAGNOSIS — N39.41 URGE INCONTINENCE: ICD-10-CM

## 2023-05-03 DIAGNOSIS — N18.30 CHRONIC KIDNEY DISEASE, STAGE 3 UNSPECIFIED: ICD-10-CM

## 2023-05-03 DIAGNOSIS — H61.20 IMPACTED CERUMEN, UNSPECIFIED EAR: ICD-10-CM

## 2023-05-03 DIAGNOSIS — Z01.818 ENCOUNTER FOR OTHER PREPROCEDURAL EXAMINATION: ICD-10-CM

## 2023-05-03 PROCEDURE — 93000 ELECTROCARDIOGRAM COMPLETE: CPT

## 2023-05-03 PROCEDURE — 99214 OFFICE O/P EST MOD 30 MIN: CPT | Mod: 25

## 2023-05-05 PROBLEM — Z01.818 PREOPERATIVE CLEARANCE: Status: ACTIVE | Noted: 2020-06-05

## 2023-05-05 PROBLEM — R73.9 ELEVATED BLOOD SUGAR: Status: ACTIVE | Noted: 2023-05-03

## 2023-05-05 PROBLEM — N18.30 CKD (CHRONIC KIDNEY DISEASE), STAGE III: Status: ACTIVE | Noted: 2018-04-09

## 2023-05-05 LAB
25(OH)D3 SERPL-MCNC: 39.3 NG/ML
ALBUMIN SERPL ELPH-MCNC: 4.4 G/DL
ALP BLD-CCNC: 63 U/L
ALT SERPL-CCNC: 7 U/L
ANION GAP SERPL CALC-SCNC: 16 MMOL/L
AST SERPL-CCNC: 12 U/L
BASOPHILS # BLD AUTO: 0.09 K/UL
BASOPHILS NFR BLD AUTO: 0.7 %
BILIRUB SERPL-MCNC: 0.3 MG/DL
BUN SERPL-MCNC: 22 MG/DL
CALCIUM SERPL-MCNC: 9.8 MG/DL
CHLORIDE SERPL-SCNC: 98 MMOL/L
CO2 SERPL-SCNC: 21 MMOL/L
CREAT SERPL-MCNC: 1.44 MG/DL
EGFR: 35 ML/MIN/1.73M2
EOSINOPHIL # BLD AUTO: 0.24 K/UL
EOSINOPHIL NFR BLD AUTO: 2 %
ESTIMATED AVERAGE GLUCOSE: 120 MG/DL
FOLATE SERPL-MCNC: 6.3 NG/ML
GLUCOSE SERPL-MCNC: 142 MG/DL
HBA1C MFR BLD HPLC: 5.8 %
HCT VFR BLD CALC: 36.7 %
HGB BLD-MCNC: 12 G/DL
IMM GRANULOCYTES NFR BLD AUTO: 0.6 %
LDLC SERPL DIRECT ASSAY-MCNC: 90 MG/DL
LYMPHOCYTES # BLD AUTO: 2.37 K/UL
LYMPHOCYTES NFR BLD AUTO: 19.3 %
MAN DIFF?: NORMAL
MCHC RBC-ENTMCNC: 29.8 PG
MCHC RBC-ENTMCNC: 32.7 GM/DL
MCV RBC AUTO: 91.1 FL
MONOCYTES # BLD AUTO: 0.85 K/UL
MONOCYTES NFR BLD AUTO: 6.9 %
NEUTROPHILS # BLD AUTO: 8.65 K/UL
NEUTROPHILS NFR BLD AUTO: 70.5 %
PLATELET # BLD AUTO: 333 K/UL
POTASSIUM SERPL-SCNC: 4.2 MMOL/L
PROT SERPL-MCNC: 6.8 G/DL
RBC # BLD: 4.03 M/UL
RBC # FLD: 13.2 %
SODIUM SERPL-SCNC: 135 MMOL/L
TSH SERPL-ACNC: 2.38 UIU/ML
VIT B12 SERPL-MCNC: 520 PG/ML
WBC # FLD AUTO: 12.27 K/UL

## 2023-05-05 NOTE — HISTORY OF PRESENT ILLNESS
[No falls in past year] : Patient reported no falls in the past year [Patient is independent with] : bathing [Independent] : transferring/mobility [] : using the telephone [FreeTextEntry1] : 86yo F with PMHx of Anxiety and Depression, CKD, cognitive impairment, accompanied by her dtr for medical clearance for ECT. Pt is followed by psychiatry Dr. Fortunato Guillen. \par \par She has had good response in mood with ECT.  continues with anxiety throughout day, sometimes with worse tremors.  but Dtr states patient has no tremors when she is watching a TV show she enjoys.\par \par she had covid in december 2022 with minimal symtpoms, but with continued fatigue, now has improved.\par \par Pt is relatively independent with ADLs. She cooks, cleans, baths, uses the bathroom independently. Lives at home with her daughter, son-in-law and 2 twin granddaughters.\par \par Has urinary incontinence wears pad and pullup. \par Denies dysuria \par \par Denies falls, constipation/diarrhea, or joint pain.\par \par Memory loss- stable\par but still able to manage ADL's\par \par more sedentary, wanting to spend more time in bed.\par \par \par \par \par  [FreeTextEntry8] : Incontinent

## 2023-05-05 NOTE — REVIEW OF SYSTEMS
[Incontinence] : incontinence [Loss Of Hearing] : hearing loss [As Noted in HPI] : as noted in HPI [Negative] : Heme/Lymph [Fever] : no fever [Chills] : no chills [Feeling Poorly] : not feeling poorly [Feeling Tired] : not feeling tired [Recent Weight Loss (___ Lbs)] : no recent weight loss [Eye Pain] : no eye pain [Nosebleeds] : no nosebleeds [Heart Rate Is Slow] : the heart rate was not slow [Heart Rate Is Fast] : the heart rate was not fast [Chest Pain] : no chest pain [Palpitations] : no palpitations [Leg Claudication] : no intermittent leg claudication [Lower Ext Edema] : no lower extremity edema [Shortness Of Breath] : no shortness of breath [Wheezing] : no wheezing [Cough] : no cough [SOB on Exertion] : no shortness of breath during exertion [Orthopnea] : no orthopnea [Abdominal Pain] : no abdominal pain [Vomiting] : no vomiting [Constipation] : no constipation [Dysuria] : no dysuria [Muscle Weakness] : no muscle weakness

## 2023-05-05 NOTE — PHYSICAL EXAM
[Alert] : alert [Sclera] : the sclera and conjunctiva were normal [EOMI] : extraocular movements were intact [Normal Outer Ear/Nose] : the ears and nose were normal in appearance [Normal Appearance] : the appearance of the neck was normal [Supple] : the neck was supple [No Respiratory Distress] : no respiratory distress [No Acc Muscle Use] : no accessory muscle use [Respiration, Rhythm And Depth] : normal respiratory rhythm and effort [Auscultation Breath Sounds / Voice Sounds] : lungs were clear to auscultation bilaterally [Normal S1, S2] : normal S1 and S2 [Murmurs] : no murmurs [Heart Rate And Rhythm] : heart rate was normal and rhythm regular [Edema] : edema was not present [Abdomen Tenderness] : non-tender [Abdomen Soft] : soft [Cervical Lymph Nodes Enlarged Posterior Bilaterally] : posterior cervical [Supraclavicular Lymph Nodes Enlarged Bilaterally] : supraclavicular [Cervical Lymph Nodes Enlarged Anterior Bilaterally] : anterior cervical, supraclavicular [No Spinal Tenderness] : no spinal tenderness [No Clubbing, Cyanosis] : no clubbing or cyanosis of the fingernails [Involuntary Movements] : no involuntary movements were seen [Motor Tone] : muscle strength and tone were normal [Normal Color / Pigmentation] : normal skin color and pigmentation [No Focal Deficits] : no focal deficits [de-identified] : intermittent tremor of bilateral hands [de-identified] : anxious [de-identified] : anxious, repetitive

## 2023-05-11 ENCOUNTER — APPOINTMENT (OUTPATIENT)
Dept: OTOLARYNGOLOGY | Facility: CLINIC | Age: 88
End: 2023-05-11

## 2023-05-15 PROCEDURE — 99442: CPT | Mod: 95

## 2023-05-16 PROCEDURE — 90870 ELECTROCONVULSIVE THERAPY: CPT

## 2023-05-16 RX ORDER — MIDAZOLAM HYDROCHLORIDE 1 MG/ML
2 INJECTION, SOLUTION INTRAMUSCULAR; INTRAVENOUS ONCE
Refills: 0 | Status: DISCONTINUED | OUTPATIENT
Start: 2023-05-16 | End: 2023-05-16

## 2023-05-16 RX ORDER — FLUMAZENIL 0.1 MG/ML
0.2 VIAL (ML) INTRAVENOUS ONCE
Refills: 0 | Status: COMPLETED | OUTPATIENT
Start: 2023-05-16 | End: 2023-05-16

## 2023-05-16 RX ADMIN — Medication 0.2 MILLIGRAM(S): at 08:47

## 2023-05-16 RX ADMIN — MIDAZOLAM HYDROCHLORIDE 2 MILLIGRAM(S): 1 INJECTION, SOLUTION INTRAMUSCULAR; INTRAVENOUS at 08:53

## 2023-05-16 NOTE — ECT TREATMENT NOTE - NSCGIIMPROVESX_PSY_ALL_CORE
2 = Much improved - notably better with signficant reduction of symptoms; increase in the level of functioning but some symptoms remain
1 - Very much improved - nearly all better; good level of functioning; minimal symptoms; represents a very substantial change
2 = Much improved - notably better with signficant reduction of symptoms; increase in the level of functioning but some symptoms remain
1 - Very much improved - nearly all better; good level of functioning; minimal symptoms; represents a very substantial change
1 - Very much improved - nearly all better; good level of functioning; minimal symptoms; represents a very substantial change
2 = Much improved - notably better with signficant reduction of symptoms; increase in the level of functioning but some symptoms remain
1 - Very much improved - nearly all better; good level of functioning; minimal symptoms; represents a very substantial change
2 = Much improved - notably better with signficant reduction of symptoms; increase in the level of functioning but some symptoms remain

## 2023-05-16 NOTE — ECT TREATMENT NOTE - NSECTCOMMENTS_PSY_ALL_CORE
Patient too anxious to speak except to request water perseveratively prior to tx. Per 4/18 EM from Dr. Guillen, patient stable through the entire treatment interval without recurrence of presenting symptoms. Mood, energy, sleep, and appetite were within normal limits. "...long term memory is declining. She needs more assistance with ADL's... no somatic preoccupations... reduce ECT frequency to q 6 weeks."

## 2023-05-16 NOTE — ECT TREATMENT NOTE - NSCGISEVERILLNESS_PSY_ALL_CORE
1 = Normal – not at all ill, symptoms of disorder not present past seven days
2 = Borderline mentally ill – subtle or suspected pathology
3 = Mildly ill – clearly established symptoms with minimal, if any, distress or difficulty in social and occupational function
3 = Mildly ill – clearly established symptoms with minimal, if any, distress or difficulty in social and occupational function
2 = Borderline mentally ill – subtle or suspected pathology
3 = Mildly ill – clearly established symptoms with minimal, if any, distress or difficulty in social and occupational function
3 = Mildly ill – clearly established symptoms with minimal, if any, distress or difficulty in social and occupational function
2 = Borderline mentally ill – subtle or suspected pathology
3 = Mildly ill – clearly established symptoms with minimal, if any, distress or difficulty in social and occupational function
3 = Mildly ill – clearly established symptoms with minimal, if any, distress or difficulty in social and occupational function
2 = Borderline mentally ill – subtle or suspected pathology
2 = Borderline mentally ill – subtle or suspected pathology

## 2023-05-30 ENCOUNTER — RX RENEWAL (OUTPATIENT)
Age: 88
End: 2023-05-30

## 2023-06-01 ENCOUNTER — APPOINTMENT (OUTPATIENT)
Dept: OTOLARYNGOLOGY | Facility: CLINIC | Age: 88
End: 2023-06-01
Payer: MEDICARE

## 2023-06-01 VITALS
WEIGHT: 135 LBS | SYSTOLIC BLOOD PRESSURE: 130 MMHG | TEMPERATURE: 36.7 F | OXYGEN SATURATION: 98 % | BODY MASS INDEX: 25.49 KG/M2 | HEART RATE: 44 BPM | DIASTOLIC BLOOD PRESSURE: 63 MMHG | HEIGHT: 61 IN

## 2023-06-01 PROCEDURE — G0268 REMOVAL OF IMPACTED WAX MD: CPT

## 2023-06-01 PROCEDURE — 99203 OFFICE O/P NEW LOW 30 MIN: CPT | Mod: 25

## 2023-06-13 PROCEDURE — 99443: CPT | Mod: 95

## 2023-06-19 NOTE — ED ADULT TRIAGE NOTE - NS ED NURSE DIRECT TO ROOM YN
June 19, 2023      Nehemias Mascorro  [address]  [place] [address]        To Whom It May Concern:    Nehemias Mascorro  was seen today virtually .  Please excuse her today  due to illness.        Sincerely,        Brigid Torres MD     No

## 2023-06-20 VITALS
OXYGEN SATURATION: 95 % | SYSTOLIC BLOOD PRESSURE: 124 MMHG | TEMPERATURE: 98 F | DIASTOLIC BLOOD PRESSURE: 47 MMHG | HEART RATE: 75 BPM | RESPIRATION RATE: 20 BRPM

## 2023-06-20 PROCEDURE — 90870 ELECTROCONVULSIVE THERAPY: CPT

## 2023-06-20 RX ORDER — FLUMAZENIL 0.1 MG/ML
0.2 VIAL (ML) INTRAVENOUS ONCE
Refills: 0 | Status: COMPLETED | OUTPATIENT
Start: 2023-06-20 | End: 2023-06-20

## 2023-06-20 RX ORDER — MIDAZOLAM HYDROCHLORIDE 1 MG/ML
2 INJECTION, SOLUTION INTRAMUSCULAR; INTRAVENOUS ONCE
Refills: 0 | Status: DISCONTINUED | OUTPATIENT
Start: 2023-06-20 | End: 2023-06-20

## 2023-06-20 RX ADMIN — MIDAZOLAM HYDROCHLORIDE 2 MILLIGRAM(S): 1 INJECTION, SOLUTION INTRAMUSCULAR; INTRAVENOUS at 08:40

## 2023-06-20 RX ADMIN — Medication 0.2 MILLIGRAM(S): at 08:34

## 2023-06-20 NOTE — ECT PRE-PROCEDURE CHECKLIST - CAREGIVER NAME
Ihsan Joseph

## 2023-06-20 NOTE — ECT TREATMENT NOTE - NSICDXBHPRIMARYDX_PSY_ALL_CORE
MDD (major depressive disorder), severe   F32.2  

## 2023-06-20 NOTE — ECT TREATMENT NOTE - NSECTTXPERFDATETIME_PSY_ALL_CORE
17-May-2022 09:00
20-Apr-2021 10:24
12-Oct-2021 09:32
04-Oct-2022 08:56
07-Dec-2021 08:49
15-Juvencio-2021 09:43
16-May-2023 08:55
26-Jul-2022 09:00
13-Jul-2021 10:49
12-Apr-2022 08:37
10-Aug-2021 10:50
20-Jun-2023 08:44
08-Nov-2022 09:54
11-Apr-2023 08:28
30-Aug-2022 08:44
30-Aug-2022 08:27
04-Jan-2022 08:57
08-Feb-2022 09:27
13-Dec-2022 09:02
15-Mar-2022 09:23
16-Sep-2021 09:16
26-Jan-2021 10:58
29-Dec-2020 11:15
01-Feb-2023 08:29
09-Nov-2021 09:46
23-Feb-2021 11:06
23-Mar-2021 11:01
21-Jun-2022 08:40
08-Mar-2023 09:32
18-May-2021 10:26

## 2023-06-20 NOTE — ECT PRE-PROCEDURE CHECKLIST - PATIENT'S GENDER IDENTITY
Female

## 2023-06-20 NOTE — ECT PRE-PROCEDURE CHECKLIST - ALLERGY BAND ON
no known allergies

## 2023-06-20 NOTE — ECT TREATMENT NOTE - NSECTCOGNTOTALTOKENFT_PSY_ALL_CORE
5  
7  
6  
7  
5  
7  
6  
5  
5  
7  
5  
7  
7  
5  
7  
6  
8  
7  
5  
7  
7  
6  
3

## 2023-06-20 NOTE — ECT AMBULATORY DISCHARGE PLAN - NSPOSTECTDIET_PSY_ALL_CORE
Gradually resume your regular diet/Increase fluids

## 2023-06-20 NOTE — ECT PRE-PROCEDURE CHECKLIST - NSADULTACCOMPPHNUMBER_PSY_ALL_CORE
[phone]
[phone]
[phone]
[phone]
[phone]
[phone]
[phone]
[phone]
[phone]
[phone]
[phone]
[phone]
[phone]
[phone]
[phone]
[phone]
[phone]
[phone]
[phone]
[phone]
[phone]
[phone]
[phone]
[phone]
[phone]
[phone]
[phone]
[phone]
[phone]
[phone]

## 2023-06-20 NOTE — ECT AMBULATORY DISCHARGE PLAN - NSPOSTECTFUPHCALL_PSY_ALL_CORE
You will receive a follow-up phone call from a nurse by the next business day after your treatment to ask how you are feeling.

## 2023-06-20 NOTE — ECT TREATMENT NOTE - NSECTTXELECPLACE_PSY_ALL_CORE
Right Unilateral

## 2023-06-20 NOTE — ECT TREATMENT NOTE - SUICIDALITY
No
Unable to assess
Unable to assess
No
Unable to assess
No

## 2023-06-20 NOTE — ECT TREATMENT NOTE - CURRENT ACTIVE IDEATION
No
Unable to assess
No
Unable to assess
Unable to assess
No
Unable to assess
No
No

## 2023-06-20 NOTE — ECT PRE-PROCEDURE CHECKLIST - CAREGIVER PHONE NUMBER
[phone]
[phone]
[phone]
[phone]
[phone]
[phone]
[phone]
[phone]
[phone]
[phone]
[phone]
[phone]
[phone]
[phone]
[phone]
[phone]
[phone]
[phone]
[phone]
[phone]
[phone]
[phone]
[phone]
[phone]
[phone]
[phone]
[phone]
[phone]
[phone]
[phone]

## 2023-06-20 NOTE — ECT TREATMENT NOTE - NSECTMACHPARA1ST_PSY_ALL_CORE
Thymatron

## 2023-06-20 NOTE — ECT AMBULATORY DISCHARGE PLAN - NSTRANSFEREYEGLASSESPAIRS_GEN_A_NUR
1 pair
Only one hearing aid right ear left in ear/1 pair
1 pair

## 2023-06-20 NOTE — ECT PRE-PROCEDURE CHECKLIST - NSPROPTRIGHTBILLOFRIGHTS_GEN_A_NUR
patient

## 2023-06-20 NOTE — ECT PRE-PROCEDURE CHECKLIST - NSPROEDALEARNPREFOTH_GEN_A_NUR
verbal instruction
verbal instruction/written material
verbal instruction
verbal instruction/written material
verbal instruction
verbal instruction/written material
verbal instruction
verbal instruction

## 2023-06-20 NOTE — ECT PRE-PROCEDURE CHECKLIST - ALLERGIES
Allergies:-  No Known Allergies      

## 2023-06-20 NOTE — ECT TREATMENT NOTE - NSECTPTEVAL_PSY_ALL_CORE
Patient evaluated and History and Physical reviewed prior to ECT. There are no significant changes to the patient's condition unless specified.

## 2023-06-20 NOTE — ECT PRE-PROCEDURE CHECKLIST - NSPTSENTTO_PSY_ALL_CORE
procedural room
holding area
procedural room
holding area
procedural room

## 2023-06-20 NOTE — ECT TREATMENT NOTE - NSECTREVSYS_PSY_ALL_CORE
Cardiovascular/Neurological/Renal/Urologic/Other

## 2023-06-20 NOTE — ECT AMBULATORY DISCHARGE PLAN - NSDCPNDISPN_GEN_ALL_CORE
Education provided on the pain management plan of care

## 2023-06-20 NOTE — ECT TREATMENT NOTE - TEMPERATURE IN CELSIUS (DEGREES C)
37.1
37.1
36.8
37.3
37.1
37.3
36.7
36.7
36.8
36.7
37.3
36.7
37.1
36.9
36.5
36.4
36.9
37.3
36.9
37.1
36.2
37.1
36.8
36.6
36.7
36.9

## 2023-06-20 NOTE — ECT PRE-PROCEDURE CHECKLIST - NSPROEDALEARNER_GEN_A_NUR
family

## 2023-06-20 NOTE — ECT PRE-PROCEDURE CHECKLIST - CAREGIVER ADDRESS
[place], [place], [place], [place], [place], [place], [place], [place], [place], [place], [place], [place], [place], [place], [place], [place], [place], [place], [place]

## 2023-06-20 NOTE — ECT PRE-PROCEDURE CHECKLIST - NSBENZOLAST24HRS_PSY_ALL_CORE
yes (notify psychiatrist)

## 2023-06-20 NOTE — ECT PRE-PROCEDURE CHECKLIST - BP NONINVASIVE SYSTOLIC (MM HG)
137
151
130
147
158
133
169
148
161
147
130
140
175
146
180
155
159
147
140
140
150
155
118
166
124
149
155
150
125

## 2023-06-20 NOTE — ECT PRE-PROCEDURE CHECKLIST - NSMEDSDOSETAKEN_PSY_ALL_CORE
Lorazepam 0.5 
0730 levothryoxine 100 mcg, clonazepam 0.5, focus eye drops, baby aspirin
Levothyroxine 100 mg   Klonopin .5mg   lose dose aspirin  [facility] vitamins
0730 levothryoxine 100 mcg, clonazepam 0.5, focus eye drops, baby aspirin
Levothyroxine 100 mg  low dose aspirin  klonopin0.5mg  Klonopin 0.5mg  [facility] vitamins
Levothyroxine 100 mg   Klonopin .5mg  low dose aspirin  klonopin0.5mg  Klonopin 0.5mg  [facility] vitamins
Levothyroxine 100 mg   Klonopin .5mg   lose dose aspirin  [facility] vitamins
Levothyroxine 100 mcg   Klonopin .5mg    aspirin 81mg  [facility] vitamins
Levothyroxine 100 mcg   Klonopin .5mg   lose dose aspirin  [facility] vitamins
Levothyroxine 100 mg   Klonopin .5mg   lose dose aspirin  [facility] vitamins
0730 levothryoxine 100 mcg, clonazepam 0.5, focus eye drops, baby aspirin
Levothyroxine 100 mcg   Klonopin .5mg   lose dose aspirin  [facility] vitamins
Levothyroxine 100 mcg   Klonopin .5mg   lose dose aspirin  [facility] vitamins
Levothyroxine 100 mg   Klonopin .5mg   lose dose aspirin  [facility] vitamins
0730 levothryoxine 100 mcg, clonazepam 0.5, focus eye drops, baby aspirin
Levothyroxine 100 mg   Klonopin .5mg   lose dose aspirin  [facility] vitamins
None
0730 levothryoxine 100 mcg, clonazepam 0.5, focus eye drops, baby aspirin
Levothyroxine 100 mg   Klonopin .5mg   lose dose aspirin  [facility] vitamins
Levothyroxine 100 mcg   Klonopin .5mg   lose dose aspirin  [facility] vitamins
0730 levothryoxine 100 mcg, clonazepam 0.5, focus eye drops, baby aspirin
Levothyroxine 100 mcg   Klonopin .5mg   lose dose aspirin  [facility] vitamins
Levothyroxine 100 mg  klonopin0.5mg  Klonopin 0.5mg  [facility] vitamins
Levothyroxine 100 mg  klonopin0.5mg  Klonopin 0.5mg  [facility] vitamins
Fluoxetine  Levothyroxine 100 mg  klonopin0.5mg  Klonopin 0.5mg  [facility] vitamins
Levothyroxine 100 mg   Klonopin .5mg   lose dose aspirin  [facility] vitamins
Levothyroxine 100 mcg   Klonopin .5mg   lose dose aspirin  [facility] vitamins
Levothyroxine 100 mcg   Klonopin .5mg   lose dose aspirin  [facility] vitamins
Fluoxetine  Levothyroxine 100 mg  Klonopin 0.5mg  [facility] vitamins
Levothyroxine 100 mg   Klonopin .5mg   lose dose aspirin  [facility] vitamins

## 2023-06-20 NOTE — ECT TREATMENT NOTE - NS ED BHA SUICIDALITY PRESENT CURRENT PASSIVE IDEATION
No
No
Unable to assess
No
Unable to assess
No
Unable to assess
Unable to assess

## 2023-06-20 NOTE — ECT TREATMENT NOTE - BP NONINVASIVE SYSTOLIC (MM HG)
159
130
147
155
175
150
130
166
149
140
146
155
161
151
147
148
147
133
133
137
140
150
140
124
118
155

## 2023-06-20 NOTE — ECT AMBULATORY DISCHARGE PLAN - NSPOSTECTCONTPROV_PSY_ALL_CORE
[facility] ([facility]) ECT Suite at [phone]
[facility] ([facility]) ECT Suite at [phone]
[facility] ([facility]) ECT Suite at [phone]
[facility] ([facility]) ECT Suite at [phone]
[facility] ([facility]) ECT Suite at [phone]
[facility] ([facility]) ECT Suite at [phone]
[facility] ([facility]) ECT Suite at [phone]
[facility] ([facility]) ECT Suite at [phone]
[facility] ([facility]) ECT Suite at [phone]
[facility] ([facility]) ECT Suite at [phone]
[facility] ([facility]) ECT Suite at [phone]
[facility] ([facility]) ECT Suite at [phone]
[facility] ([facility]) ECT Suite at [phone]
[facility] ([facility]) ECT Suite at [phone]
[facility] ([facility]) ECT Suite at [phone]
[facility] ([facility]) ECT Suite at [phone]
[facility] ([facility]) ECT Suite at [phone]
[facility] ([facility]) ECT Suite at [phone]
[facility] ([facility]) ECT Suite at [phone]
[facility] ([facility]) ECT Suite at [phone]
[facility] ([facility]) ECT Suite at [phone]
[facility] ([facility]) ECT Suite at [phone]
[facility] ([facility]) ECT Suite at [phone]
[facility] ([facility]) ECT Suite at [phone]
[facility] ([facility]) ECT Suite at [phone]
[facility] ([facility]) ECT Suite at [phone]
[facility] ([facility]) ECT Suite at [phone]
[facility] ([facility]) ECT Suite at [phone]
[facility] ([facility]) ECT Suite at [phone]

## 2023-06-20 NOTE — ECT PRE-PROCEDURE CHECKLIST - AS BP NONINV SITE
left upper arm
left lower arm
left upper arm
right upper arm
left upper arm
right upper arm
left upper arm
left lower arm
right lower arm
left upper arm
left upper arm

## 2023-06-20 NOTE — ECT AMBULATORY DISCHARGE PLAN - NSPOSTECTPTCOND_PSY_ALL_CORE
Stable

## 2023-06-20 NOTE — ECT PRE-PROCEDURE CHECKLIST - PERIPHERAL IV: INSERTION DATE
04-Jan-2022
12-Oct-2021
13-Dec-2022
26-Jul-2022
26-Jan-2021
08-Feb-2022
08-Nov-2022
15-Mar-2022
01-Feb-2023
15-Juvencio-2021
11-Apr-2023
18-May-2021
16-May-2023
10-Aug-2021
29-Dec-2020
07-Dec-2021
21-Jun-2022
13-Jul-2021
26-Jul-2022
20-Apr-2021
09-Nov-2021
20-Jun-2023
08-Mar-2023
23-Feb-2021
04-Oct-2022

## 2023-06-20 NOTE — ECT PRE-PROCEDURE CHECKLIST - PATIENT PROBLEMS/NEEDS
Patient expressed no known problems or needs

## 2023-06-20 NOTE — ECT PRE-PROCEDURE CHECKLIST - NSECTNPODT_PSY_ALL_CORE
16-May-2023 18:00
12-Oct-2021 12:00
25-Jul-2022 19:00
16-May-2022 20:00
29-Aug-2022 18:00
13-Jul-2021 07:00
20-Jun-2022 19:00
09-Aug-2021 20:00
15-Juvencio-2021 08:00
10-Apr-2023 21:00
03-Oct-2022 18:00
07-Dec-2021 12:00
07-Mar-2023 19:30
18-May-2021 12:00
01-Feb-2023 06:00
20-Jun-2023 06:00
20-Apr-2021 00:00
07-Feb-2022 18:00
07-Nov-2022 18:00
11-Apr-2022 18:00
12-Dec-2022 18:00
15-Mar-2022 17:00
22-Feb-2021 19:00
22-Mar-2021
26-Jan-2021 00:00
08-Nov-2021 22:00
29-Dec-2020 00:00
03-Jan-2022 19:00
15-Sep-2021 19:00

## 2023-06-20 NOTE — ECT PRE-PROCEDURE CHECKLIST - NSPROEDAABILITYLEARNOTHER_GEN_A_NUR
none

## 2023-06-20 NOTE — ECT PRE-PROCEDURE CHECKLIST - NSPTSENTVIA_PSY_ALL_CORE
Attempted to contact patient to discuss.  No answer, I will see her Wed as scheduled. We may need to complete punch biopsy at that time.   
Came in a couple months ago with a back rash.  Cream never did help.  Now its on her stomach and chest and one on shin. Itches really bad.  She used all the cream.  She is leaving out of town.  Jessy Godinez LPN..........4/23/2018  12:06 PM    
I can see patient at 4:20.  Please schedule  
Pt has a rash and is going to be out of town would like to get in today  
Spoke with patient.  She was unable to come in today.  An appointment was made for 4.25.2018.  Jenni Potts on 4/23/2018 at 12:18 PM  
ambulate
ambulate
stretcher
ambulate
stretcher
stretcher
ambulate
ambulate
stretcher
stretcher
ambulate
stretcher
ambulate
stretcher
stretcher
ambulate
stretcher
ambulate
stretcher
ambulate
stretcher

## 2023-06-20 NOTE — ECT AMBULATORY DISCHARGE PLAN - PATIENT PORTAL LINK FT
You can access the FollowMyHealth Patient Portal offered by [facility] by registering at the following website: [email or web address]. By joining [employer]’s FollowMyHealth portal, you will also be able to view your health information using other applications (apps) compatible with our system.
You can access the FollowMyHealth Patient Portal offered by [facility] by registering at the following website: [email or web address]. By joining [employer]’s FollowMyHealth portal, you will also be able to view your health information using other applications (apps) compatible with our system.
You can access the FollowMyHealth Patient Portal offered by [facility] by registering at the following website: [email or web address]. By joining [employer]’s FollowMyHealth portal, you will also be able to view your health information using other applications (apps) compatible with our system.
You can access the FollowMyHealth Patient Portal offered by [facility] by registering at the following website: [email or web address]. By joining [employer]’s FollowMyHealth portal, you will also be able to view your health information using other applications (apps) compatible with our system.
You can access the FollowMyHealth Patient Portal offered by [facility] by registering at the following website: [email or web address]. By joining [employer]’s FollowMyHealth portal, you will also be able to view your health information using other applications (apps) compatible with our system.
You can access the FollowMyHealth Patient Portal offered by [facility] by registering at the following website: [email or web address]. By joining [employer]’s FollowMyHealth portal, you will also be able to view your health information using other applications (apps) compatible with our system.
You can access the FollowMyHealth Patient Portal offered by [facility] by registering at the following website: [email or web address]. By joining [employer]’s FollowMyHealth portal, you will also be able to view your health information using other applications (apps) compatible with our system.
You can access the FollowMyHealth Patient Portal offered by [facility] by registering at the following website: [email or web address]. By joining [employer]’s FollowMyHealth portal, you will also be able to view your health information using other applications (apps) compatible with our system.
You can access the FollowMyHealth Patient Portal offered by [facility] by registering at the following website: [email or web address]. By joining [employer]’s FollowMyHealth portal, you will also be able to view your health information using other applications (apps) compatible with our system.
You can access the FollowMyHealth Patient Portal offered by [facility] by registering at the following website: [email or web address]. By joining [employer]’s FollowMyHealth portal, you will also be able to view your health information using other applications (apps) compatible with our system.
You can access the FollowMyHealth Patient Portal offered by [facility] by registering at the following website: [email or web address]. By joining [employer]’s FollowMyHealth portal, you will also be able to view your health information using other applications (apps) compatible with our system.
You can access the FollowMyHealth Patient Portal offered by [facility] by registering at the following website: [email or web address]. By joining [employer]’s FollowMyHealth portal, you will also be able to view your health information using other applications (apps) compatible with our system.
You can access the FollowMyHealth Patient Portal offered by [facility] by registering at the following website: [email or web address]. By joining [employer]’s FollowMyHealth portal, you will also be able to view your health information using other applications (apps) compatible with our system.
You can access the FollowMyHealth Patient Portal offered by [facility] by registering at the following website: [email or web address]. By joining [employer]’s FollowMyHealth portal, you will also be able to view your health information using other applications (apps) compatible with our system.
You can access the FollowMyHealth Patient Portal offered by [facility] by registering at the following website: [email or web address]. By joining [employer]’s FollowMyHealth portal, you will also be able to view your health information using other applications (apps) compatible with our system.
You can access the FollowMyHealth Patient Portal offered by [facility] by registering at the following website: [email or web address]. By joining [employer]’s FollowMyHealth portal, you will also be able to view your health information using other applications (apps) compatible with our system.
You can access the FollowMyHealth Patient Portal offered by [facility] by registering at the following website: [email or web address]. By joining [employer]’s FollowMyHealth portal, you will also be able to view your health information using other applications (apps) compatible with our system.
You can access the FollowMyHealth Patient Portal offered by [facility] by registering at the following website: [email or web address]. By joining [employer]’s FollowMyHealth portal, you will also be able to view your health information using other applications (apps) compatible with our system.
You can access the FollowMyHealth Patient Portal offered by [facility] by registering at the following website: [email or web address]. By joining [employer]’s FollowMyHealth portal, you will also be able to view your health information using other applications (apps) compatible with our system.
You can access the FollowMyHealth Patient Portal offered by [facility] by registering at the following website: [email or web address]. By joining [employer]’s FollowMyHealth portal, you will also be able to view your health information using other applications (apps) compatible with our system.
You can access the FollowMyHealth Patient Portal offered by [facility] by registering at the following website: [email or web address]. By joining [employer]’s FollowMyHealth portal, you will also be able to view your health information using other applications (apps) compatible with our system.
You can access the FollowMyHealth Patient Portal offered by [facility] by registering at the following website: [email or web address]. By joining [employer]’s FollowMyHealth portal, you will also be able to view your health information using other applications (apps) compatible with our system.
You can access the FollowMyHealth Patient Portal offered by [facility] by registering at the following website: [email or web address]. By joining [employer]’s FollowMyHealth portal, you will also be able to view your health information using other applications (apps) compatible with our system.
You can access the FollowMyHealth Patient Portal offered by [facility] by registering at the following website: [email or web address]. By joining [employer]’s FollowMyHealth portal, you will also be able to view your health information using other applications (apps) compatible with our system.
You can access the FollowMyHealth Patient Portal offered by [facility] by registering at the following website: [email or web address]. By joining [employer]’s FollowMyHealth portal, you will also be able to view your health information using other applications (apps) compatible with our system.
You can access the FollowMyHealth Patient Portal offered by [facility] by registering at the following website: [email or web address]. By joining [employer]’s FollowMyHealth portal, you will also be able to view your health information using other applications (apps) compatible with our system.
You can access the FollowMyHealth Patient Portal offered by [facility] by registering at the following website: [email or web address]. By joining [employer]’s FollowMyHealth portal, you will also be able to view your health information using other applications (apps) compatible with our system.
You can access the FollowMyHealth Patient Portal offered by [facility] by registering at the following website: [email or web address]. By joining [employer]’s FollowMyHealth portal, you will also be able to view your health information using other applications (apps) compatible with our system.
You can access the FollowMyHealth Patient Portal offered by [facility] by registering at the following website: [email or web address]. By joining [employer]’s FollowMyHealth portal, you will also be able to view your health information using other applications (apps) compatible with our system.
You can access the FollowMyHealth Patient Portal offered by [facility] by registering at the following website: [email or web address]. By joining [employer]’s FollowMyHealth portal, you will also be able to view your health information using other applications (apps) compatible with our system.

## 2023-06-20 NOTE — ECT TREATMENT NOTE - NSECTPOSTTXMEDS_PSY_ALL_CORE
2 mg IV Push
2 mg
2 mg
2 mg IV Push

## 2023-06-20 NOTE — ECT PRE-PROCEDURE CHECKLIST - PRO INTERPRETER NEED 2
English

## 2023-06-20 NOTE — ECT AMBULATORY DISCHARGE PLAN - NSPOSTECTMEDCLEARANCE_PSY_ALL_CORE
22-Sep-2022
07-Apr-2022
31-Oct-2021
31-Oct-2021
01-Dec-2022
05-Aug-2021
23-Feb-2023
01-Aug-2023
06-May-2021
11-May-2023
18-Feb-2021
20-Jan-2022
26-Jun-2022

## 2023-06-20 NOTE — ECT AMBULATORY DISCHARGE PLAN - NSDPDISTO_GEN_ALL_CORE
Home

## 2023-06-20 NOTE — ECT AMBULATORY DISCHARGE PLAN - MODE OF TRANSPORTATION
Wheelchair/Stroller
Ambulatory
Wheelchair/Stroller

## 2023-06-20 NOTE — ECT PRE-PROCEDURE CHECKLIST - SIDE RAILS UP
done

## 2023-06-20 NOTE — ECT PRE-PROCEDURE CHECKLIST - INV PERIPH IV SIZE
22 gauge
24 gauge
24 gauge
22 gauge
24 gauge
22 gauge
24 gauge
22 gauge
24 gauge
22 gauge
22 gauge
22 gauge/1.0 in length
24 gauge/0.75 (3/4) in length

## 2023-06-20 NOTE — ECT PRE-PROCEDURE CHECKLIST - HAND OFF
yes
yes
Unit RN to OR RN
Unit RN to OR RN
yes
Unit RN to OR RN
Unit RN to OR RN
Holding RN to OR RN
yes
Unit RN to OR RN
yes
Unit RN to OR RN
Holding RN to OR RN
Holding RN to OR RN
yes
Holding RN to OR RN
yes
yes
Holding RN to OR RN
yes
Unit RN to OR RN

## 2023-06-20 NOTE — ECT PRE-PROCEDURE CHECKLIST - NSPROPTRIGHTNOTIFY_GEN_A_NUR
declines

## 2023-06-20 NOTE — ECT TREATMENT NOTE - NSECTMACHPARASTIMPERC_PSY_ALL_CORE
100

## 2023-06-20 NOTE — ECT TREATMENT NOTE - NSECTCPTCODE_PSY_ALL_CORE
05616 (ECT-Electroconvulsive Therapy)
95319 (ECT-Electroconvulsive Therapy)
75324 (ECT-Electroconvulsive Therapy)
98397 (ECT-Electroconvulsive Therapy)
20233 (ECT-Electroconvulsive Therapy)
40850 (ECT-Electroconvulsive Therapy)
55657 (ECT-Electroconvulsive Therapy)
65539 (ECT-Electroconvulsive Therapy)
44760 (ECT-Electroconvulsive Therapy)
31141 (ECT-Electroconvulsive Therapy)
63087 (ECT-Electroconvulsive Therapy)
91504 (ECT-Electroconvulsive Therapy)
71644 (ECT-Electroconvulsive Therapy)
37482 (ECT-Electroconvulsive Therapy)
76844 (ECT-Electroconvulsive Therapy)
64178 (ECT-Electroconvulsive Therapy)
71391 (ECT-Electroconvulsive Therapy)
60416 (ECT-Electroconvulsive Therapy)
47031 (ECT-Electroconvulsive Therapy)
50001 (ECT-Electroconvulsive Therapy)
11483 (ECT-Electroconvulsive Therapy)
31129 (ECT-Electroconvulsive Therapy)
89445 (ECT-Electroconvulsive Therapy)
90421 (ECT-Electroconvulsive Therapy)
11526 (ECT-Electroconvulsive Therapy)
38977 (ECT-Electroconvulsive Therapy)
78305 (ECT-Electroconvulsive Therapy)
15017 (ECT-Electroconvulsive Therapy)
82431 (ECT-Electroconvulsive Therapy)
90367 (ECT-Electroconvulsive Therapy)

## 2023-06-20 NOTE — ECT PRE-PROCEDURE CHECKLIST - HOW PATIENT ADDRESSED, PROFILE
Pratima

## 2023-06-20 NOTE — ECT PRE-PROCEDURE CHECKLIST - TEMPERATURE IN CELSIUS (DEGREES C)
36.6
36.8
36.7
37.1
36.8
37.1
37.3
36.9
36.8
36.9
36.2
36.7
37.1
36.5
36.9
37.1
36.7
36.8
36.8
36.9
37.1
36.4
36.7

## 2023-06-20 NOTE — ECT TREATMENT NOTE - NSECTCARDIOCOMMENT_PSY_ALL_CORE
Hypertension, hyperlipidemia

## 2023-06-20 NOTE — ECT TREATMENT NOTE - NSSUICPROTFACT_PSY_ALL_CORE
Responsibility to children, family, or others/Supportive social network of family or friends
Identifies reasons for living/Supportive social network of family or friends
Responsibility to children, family, or others/Supportive social network of family or friends
Supportive social network of family or friends/Positive therapeutic relationships
Responsibility to children, family, or others/Supportive social network of family or friends
Supportive social network of family or friends/Positive therapeutic relationships
Responsibility to children, family, or others/Supportive social network of family or friends
Identifies reasons for living/Supportive social network of family or friends
Responsibility to children, family, or others/Supportive social network of family or friends
Responsibility to children, family, or others/Supportive social network of family or friends/Positive therapeutic relationships
Responsibility to children, family, or others/Supportive social network of family or friends
Responsibility to children, family, or others/Supportive social network of family or friends/Positive therapeutic relationships
Identifies reasons for living/Supportive social network of family or friends
Responsibility to children, family, or others/Supportive social network of family or friends
Responsibility to children, family, or others/Supportive social network of family or friends
Supportive social network of family or friends/Positive therapeutic relationships
Supportive social network of family or friends/Positive therapeutic relationships
Responsibility to children, family, or others/Supportive social network of family or friends
Identifies reasons for living/Supportive social network of family or friends
Responsibility to children, family, or others/Supportive social network of family or friends
Responsibility to children, family, or others/Supportive social network of family or friends

## 2023-06-20 NOTE — ECT PRE-PROCEDURE CHECKLIST - PATIENT'S PREFERRED PRONOUN
Her/She

## 2023-06-20 NOTE — ECT AMBULATORY DISCHARGE PLAN - NSPOSTECTPOSSCOMP_PSY_ALL_CORE
Headache, nausea, general body aches are common experiences after this treatment.  These may be treated with over-the-counter pain medication.

## 2023-06-20 NOTE — ECT TREATMENT NOTE - NSECTTHYPULSE1ST_PSY_ALL_CORE
0.25 ms
29-Mar-2021

## 2023-06-20 NOTE — ECT PRE-PROCEDURE CHECKLIST - TEMPERATURE IN FAHRENHEIT (DEGREES F)
98.8
98.2
98.3
98.8
99.1
97.2
98.3
98.5
98.7
98.7
98
98
98.7
98.4
98.4
98
99.2
97.6
98.4
98.1
97.7
98.2
98
97.8
98
98.8
99.1
98.3
98.7

## 2023-06-20 NOTE — ECT PRE-PROCEDURE CHECKLIST - NSADULTACCOMPNAME_PSY_ALL_CORE
Ihsan Joseph     son in law
Karla/Daughter
Ihsan Joseph
Ihsan Joseph
Karla/Daughter
Ihsan Joseph
Karla/Daughter
Ihsan/son in law
Ihsan Joseph     son in law
Karla/Daughter
Karla/Daughter
Ihsan Joseph
Karla/Daughter
Ihsan Joseph
Karla/Daughter
Ihsan Joseph     son in law
Ihsan Joseph
Ihsan Joseph     son in law
Karla/Daughter
Karla/Daughter
Ihsan Joseph     son in law
Karla/Daughter
Karla/Daughter

## 2023-06-20 NOTE — ECT AMBULATORY DISCHARGE PLAN - NSDCPEFALRISK_GEN_ALL_CORE
For information on Fall & Injury Prevention, visit: https://www.[facility]/news/fall-prevention-protects-and-maintains-health-and-mobility OR  https://www.[facility]/news/fall-prevention-tips-to-avoid-injury OR  https://www.cdc.gov/steadi/patient.html
For information on Fall & Injury Prevention, visit: https://www.[facility]/news/fall-prevention-protects-and-maintains-health-and-mobility OR  https://www.[facility]/news/fall-prevention-tips-to-avoid-injury OR  https://www.cdc.gov/steadi/patient.html
Patient information on fall and injury prevention
For information on Fall & Injury Prevention, visit: https://www.[facility]/news/fall-prevention-protects-and-maintains-health-and-mobility OR  https://www.[facility]/news/fall-prevention-tips-to-avoid-injury OR  https://www.cdc.gov/steadi/patient.html
For information on Fall & Injury Prevention, visit: https://www.[facility]/news/fall-prevention-protects-and-maintains-health-and-mobility OR  https://www.[facility]/news/fall-prevention-tips-to-avoid-injury OR  https://www.cdc.gov/steadi/patient.html
Patient information on fall and injury prevention
For information on Fall & Injury Prevention, visit: https://www.[facility]/news/fall-prevention-protects-and-maintains-health-and-mobility OR  https://www.[facility]/news/fall-prevention-tips-to-avoid-injury OR  https://www.cdc.gov/steadi/patient.html
For information on Fall & Injury Prevention, visit: https://www.[facility]/news/fall-prevention-protects-and-maintains-health-and-mobility OR  https://www.[facility]/news/fall-prevention-tips-to-avoid-injury OR  https://www.cdc.gov/steadi/patient.html
For information on Fall & Injury Prevention, visit: https://www.[facility]/news/fall-prevention-protects-and-maintains-health-and-mobility OR  https://www.[facility]/news/fall-prevention-tips-to-avoid-injury OR  https://www.cdc.gov/steadi/patient.html
For information on Fall & Injury Prevention, visit: https://www.[facility]/news/fall-prevention-protects-and-maintains-health-and-mobility OR  https://www.[facility]/news/fall-prevention-tips-to-avoid-injury OR  https://www.cdc.gov/steadi/patient.html
Patient information on fall and injury prevention
Patient information on fall and injury prevention
For information on Fall & injury Prevention, visit https://www.[facility]/news/fall-prevention-tips-to-avoid-injury
Patient information on fall and injury prevention
For information on Fall & Injury Prevention, visit: https://www.[facility]/news/fall-prevention-protects-and-maintains-health-and-mobility OR  https://www.[facility]/news/fall-prevention-tips-to-avoid-injury OR  https://www.cdc.gov/steadi/patient.html
For information on Fall & Injury Prevention, visit: https://www.[facility]/news/fall-prevention-protects-and-maintains-health-and-mobility OR  https://www.[facility]/news/fall-prevention-tips-to-avoid-injury OR  https://www.cdc.gov/steadi/patient.html
For information on Fall & Injury Prevention, visit: https://www.[facility]/news/fall-prevention-protects-and-maintains-health-and-mobility OR  https://www.[facility]/news/fall-prevention-tips-to-avoid-injury OR  https://www.cdc.gov/steadi/patient.html
For information on Fall & Injury Prevention, visit: https://www.[facility]/news/fall-prevention-protects-and-maintains-health-and-mobility OR  https://www.[facility]/news/fall-prevention-tips-to-avoid-injury OR  https://www.cdc.gov/steadi/patient.html
For information on Fall & injury Prevention, visit https://www.[facility]/news/fall-prevention-tips-to-avoid-injury
For information on Fall & Injury Prevention, visit: https://www.[facility]/news/fall-prevention-protects-and-maintains-health-and-mobility OR  https://www.[facility]/news/fall-prevention-tips-to-avoid-injury OR  https://www.cdc.gov/steadi/patient.html
For information on Fall & Injury Prevention, visit: https://www.[facility]/news/fall-prevention-protects-and-maintains-health-and-mobility OR  https://www.[facility]/news/fall-prevention-tips-to-avoid-injury OR  https://www.cdc.gov/steadi/patient.html
For information on Fall & Injury Prevention, visit: https://www.[facility]/news/fall-prevention-protects-and-maintains-health-and-mobility OR  https://www.[facility]/news/fall-prevention-tips-to-avoid-injury OR  https://www.cdc.gov/steadi/patient.html
For information on Fall & Injury Prevention, visit: https://www.[facility]/news/fall-prevention-protects-and-maintains-health-and-mobility OR  https://www.[facility]/news/fall-prevention-tips-to-avoid-injury OR  https://www.cdc.gov/steadi/patient.html
For information on Fall & injury Prevention, visit https://www.[facility]/news/fall-prevention-tips-to-avoid-injury
For information on Fall & Injury Prevention, visit: https://www.[facility]/news/fall-prevention-protects-and-maintains-health-and-mobility OR  https://www.[facility]/news/fall-prevention-tips-to-avoid-injury OR  https://www.cdc.gov/steadi/patient.html
For information on Fall & Injury Prevention, visit: https://www.[facility]/news/fall-prevention-protects-and-maintains-health-and-mobility OR  https://www.[facility]/news/fall-prevention-tips-to-avoid-injury OR  https://www.cdc.gov/steadi/patient.html

## 2023-06-20 NOTE — ECT TREATMENT NOTE - NSECTFLUMAZ_PSY_ALL_CORE
0.2 mg IV Push

## 2023-06-20 NOTE — ECT PRE-PROCEDURE CHECKLIST - NSTRANSFEREYEGLASSESPAIRS_GEN_A_NUR
1 pair
Only one hearing aid right ear left in ear/1 pair
1 pair

## 2023-06-20 NOTE — ECT AMBULATORY DISCHARGE PLAN - NSPOSTECTRESTRIC_PSY_ALL_CORE
Drive a car, operate power tools or machinery./Drink alcohol, beer, or wine./Make important personal and business decisions./If you have had any type of sedation, you may experience lightheadedness, dizziness, or sleepiness following your procedure.  A responsible adult should stay with you for at least 24 hours following your procedure.

## 2023-06-20 NOTE — ECT TREATMENT NOTE - NSECTROSNEGAT_PSY_ALL_CORE
Review of Systems negative/unchanged from previous exam except as noted below

## 2023-06-20 NOTE — ECT PRE-PROCEDURE CHECKLIST - LAST TOOK
solids
solids
clears
meds with sips of wtaer/clears
clears
meds with sip of water/clears
solids
solids
clears
solids
clears
solids
clears
solids
clears
solids
solids
meds with sips of water/clears
solids
meds with sips of water at 7 am/clears
solids
clears
clears

## 2023-06-20 NOTE — ECT PRE-PROCEDURE CHECKLIST - NSPTPHYSICALIMPAIR_PSY_ALL_CORE
difficulty hearing/yes (specify)
no
difficulty hearing/yes (specify)
no
difficulty hearing/yes (specify)
no

## 2023-06-20 NOTE — ECT AMBULATORY DISCHARGE PLAN - NSPOSTECTSMOKING_PSY_ALL_CORE
If you are a smoker, it is important for your health to stop smoking.  Please be aware that second hand smoke is also harmful.

## 2023-06-20 NOTE — ECT TREATMENT NOTE - NSECTOTHERCOMMENT_PSY_ALL_CORE
Hypothyroidism, basal cell s/p Mohs surgery

## 2023-06-20 NOTE — ECT PRE-PROCEDURE CHECKLIST - AS TEMP SITE
oral

## 2023-06-20 NOTE — ECT AMBULATORY DISCHARGE PLAN - NURSING SECTION COMPLETE
Patient/Caregiver provided printed discharge information.

## 2023-06-20 NOTE — ECT TREATMENT NOTE - NSECTCOMMENTS_PSY_ALL_CORE
Patient mute except to ask for water. Appears highly anxious. Per daughter to RN she believes the patient may have derived maximum benefit from ECT. They are to discuss the treatment plan with their primary psychiatrist. We will continue maintenance ECT to help reduce the chances of relapse.  Patient mute except to ask for water. Appears highly anxious. Per 6/13 EM note from Dr. Guillen, patient "has been more anxious when leaving the house... daughter feels ECT has "run its course" and has affected her memory and would like to discontinue treatment with ECT... patient to go for one more treatment next week..."    Will hold ECT and d/w Dr. Guillen Patient mute except to ask for water. Appears highly anxious. Per 6/13 EM note from Dr. Guillen, patient "has been more anxious when leaving the house... daughter feels ECT has "run its course" and has affected her memory and would like to discontinue treatment with ECT... patient to go for one more treatment next week..."    Will d/w Dr. Guillen

## 2023-06-20 NOTE — ECT TREATMENT NOTE - NSECTFOCPELUNGS_PSY_ALL_CORE
Unremarkable

## 2023-06-20 NOTE — ECT TREATMENT NOTE - NSECTPOSTTXSUMMARY_PSY_ALL_CORE
The patient had a well modified grand mal seizure under general anesthesia and muscle relaxant. The patient is alert, responsive, in no acute distress.  Recovery uneventful.
The patient had a well modified grand mal seizure under general anesthesia and muscle relaxant.  The patient is alert, responsive, and in no acute distress. Recovery uneventful.
The patient had a well modified grand mal seizure under general anesthesia and muscle relaxant. The patient is alert, responsive, in no acute distress.  Recovery uneventful.
The patient had a well modified grand mal seizure under general anesthesia and muscle relaxant. The patient is alert, responsive, in no acute distress.  Recovery uneventful. 
The patient had a well modified grand mal seizure under general anesthesia and muscle relaxant.  The patient is alert, responsive, and in no acute distress. Recovery uneventful.
The patient had a well modified grand mal seizure under general anesthesia and muscle relaxant. The patient is alert, responsive, in no acute distress.  Recovery uneventful. 
The patient had a well modified grand mal seizure under general anesthesia and muscle relaxant.  The patient is alert, responsive, and in no acute distress. Recovery uneventful.
The patient had a well modified grand mal seizure under general anesthesia and muscle relaxant. The patient is alert, responsive, in no acute distress.  Recovery uneventful. 
The patient had a well modified grand mal seizure under general anesthesia and muscle relaxant. The patient is alert, responsive, in no acute distress.  Recovery uneventful. 
The patient had a well modified grand mal seizure under general anesthesia and muscle relaxant.  The patient is alert, responsive, and in no acute distress. Recovery uneventful.
The patient had a well modified grand mal seizure under general anesthesia and muscle relaxant. The patient is alert, responsive, in no acute distress.  Recovery uneventful.
The patient had a well modified grand mal seizure under general anesthesia and muscle relaxant. The patient is alert, responsive, in no acute distress.  Recovery uneventful. 
The patient had a well modified grand mal seizure under general anesthesia and muscle relaxant.  The patient is alert, responsive, and in no acute distress. Recovery uneventful.
The patient had a well modified grand mal seizure under general anesthesia and muscle relaxant. The patient is alert, responsive, in no acute distress.  Recovery uneventful.
The patient had a well modified grand mal seizure under general anesthesia and muscle relaxant. The patient is alert, responsive, in no acute distress.  Recovery uneventful.
The patient had a well modified grand mal seizure under general anesthesia and muscle relaxant.  The patient is alert, responsive, and in no acute distress. Recovery uneventful.
The patient had a well modified grand mal seizure under general anesthesia and muscle relaxant. The patient is alert, responsive, in no acute distress.  Recovery uneventful.
The patient had a well modified grand mal seizure under general anesthesia and muscle relaxant. The patient is alert, responsive, in no acute distress.  Recovery uneventful.
The patient had a well modified grand mal seizure under general anesthesia and muscle relaxant.  The patient is alert, responsive, and in no acute distress. Recovery uneventful.
The patient had a well modified grand mal seizure under general anesthesia and muscle relaxant. The patient is alert, responsive, in no acute distress.  Recovery uneventful. 
The patient had a well modified grand mal seizure under general anesthesia and muscle relaxant.  The patient is alert, responsive, and in no acute distress. Recovery uneventful.
The patient had a well modified grand mal seizure under general anesthesia and muscle relaxant. The patient is alert, responsive, in no acute distress.  Recovery uneventful.
The patient had a well modified grand mal seizure under general anesthesia and muscle relaxant. The patient is alert, responsive, in no acute distress.  Recovery uneventful.
The patient had a well modified grand mal seizure under general anesthesia and muscle relaxant.  The patient is alert, responsive, and in no acute distress. Recovery uneventful.
The patient had a well modified grand mal seizure under general anesthesia and muscle relaxant.  The patient is alert, responsive, and in no acute distress. Recovery uneventful.
The patient had a well modified grand mal seizure under general anesthesia and muscle relaxant. The patient is alert, responsive, in no acute distress.  Recovery uneventful.
The patient had a well modified grand mal seizure under general anesthesia and muscle relaxant. The patient is alert, responsive, in no acute distress.  Recovery uneventful.

## 2023-06-20 NOTE — ECT TREATMENT NOTE - NSICDXBHSECONDARYDX_PSY_ALL_CORE
Hypertension   I10  Generalized anxiety disorder   F41.1  
Generalized anxiety disorder   F41.1  Hypertension   I10  
Hypertension   I10  Generalized anxiety disorder   F41.1  
Generalized anxiety disorder   F41.1  Hypertension   I10  
Generalized anxiety disorder   F41.1  Hypertension   I10  
Hypertension   I10  Generalized anxiety disorder   F41.1  
Generalized anxiety disorder   F41.1  Hypertension   I10  
Hypertension   I10  Generalized anxiety disorder   F41.1  
Generalized anxiety disorder   F41.1  Hypertension   I10  
Hypertension   I10  Generalized anxiety disorder   F41.1  
Generalized anxiety disorder   F41.1  Hypertension   I10  
Hypertension   I10  Generalized anxiety disorder   F41.1

## 2023-06-20 NOTE — ECT TREATMENT NOTE - NSECTFOCPECOMPLET_PSY_ALL_CORE
Focused Physical Exam Completed

## 2023-06-20 NOTE — ECT TREATMENT NOTE - TEMPERATURE IN FAHRENHEIT (DEGREES F)
97.6
98
98.8
98.4
98.7
97.7
98.8
99.1
99.2
97.8
98.7
98.7
98.5
98
98.2
98.1
97.2
98.4
98.4
98
98.7
98.3
99.1
98
99.1
98.2

## 2023-06-20 NOTE — ECT PRE-PROCEDURE CHECKLIST - NSMEDADMRECREV_PSY_ALL_CORE
n/a
Michael Peters
n/a

## 2023-06-20 NOTE — ECT AMBULATORY DISCHARGE PLAN - NSDPACMPNY_GEN_ALL_CORE
Family
Caregiver
Family

## 2023-06-20 NOTE — ECT AMBULATORY DISCHARGE PLAN - NSPOSTECTSYMPTOMS_PSY_ALL_CORE
Excessive Diarrhea/Fever/Inability to tolerate liquids or foods/Increased irritability or sluggishness/Nausea and vomiting that does not stop/Pain not relieved by medications/Unable to urinate

## 2023-06-20 NOTE — ECT AMBULATORY DISCHARGE PLAN - NSECTPROCEDUREDATE_PSY_ALL_CORE
01-Feb-2023
09-Nov-2021
29-Dec-2020
30-Aug-2022
26-Jul-2022
13-Jul-2021
04-Oct-2022
08-Feb-2022
10-Aug-2021
13-Dec-2022
18-May-2021
07-Dec-2021
15-Mar-2022
16-Sep-2021
08-Mar-2023
08-Nov-2022
17-May-2022
30-Aug-2022
12-Apr-2022
04-Jan-2022
11-Apr-2023
12-Oct-2021
15-Juvencio-2021
16-May-2023
20-Jun-2023
21-Jun-2022
20-Apr-2021
23-Feb-2021
23-Mar-2021
26-Jan-2021

## 2023-06-20 NOTE — ECT AMBULATORY DISCHARGE PLAN - NSPOSTECTCALLBEFORE_PSY_ALL_CORE
[facility] ([facility]) scheduling office at [phone]
[facility] ([facility]) scheduling office at [phone]
[facility] ([facility]) scheduling office at [phone]
[facility] ([facility]) scheduling office at [phone]
[facility] ([facility]) scheduling office at [phone]
[facility] ([facility]) scheduling office at [phone]
[facility] ([facility]) scheduling office at [phone]
[facility] ([facility]) scheduling office at [phone]
[facility] ([facility]) scheduling office at [phone]
[facility] ([facility]) scheduling office at [phone]
[facility] ([facility]) scheduling office at [phone]
[facility] ([facility]) scheduling office at [phone]
[facility] ([facility]) scheduling office at [phone]
[facility] ([facility]) scheduling office at [phone]
[facility] ([facility]) scheduling office at [phone]
[facility] ([facility]) scheduling office at [phone]
[facility] ([facility]) scheduling office at [phone]
[facility] ([facility]) scheduling office at [phone]
[facility] ([facility]) scheduling office at [phone]
[facility] ([facility]) scheduling office at [phone]
[facility] ([facility]) scheduling office at [phone]
Health system ([facility]) scheduling office at [phone]
[facility] ([facility]) scheduling office at [phone]
[facility] ([facility]) scheduling office at [phone]
[facility] ([facility]) scheduling office at [phone]
[facility] ([facility]) scheduling office at [phone]
[facility] ([facility]) scheduling office at [phone]
[facility] ([facility]) scheduling office at [phone]
[facility] ([facility]) scheduling office at [phone]

## 2023-06-20 NOTE — ECT PRE-PROCEDURE CHECKLIST - NSPROEDAREADYLEARN_GEN_A_NUR
anxiety
anxiety
acuteness of illness/anxiety
anxiety
acuteness of illness/anxiety
anxiety
acuteness of illness/anxiety
anxiety
anxiety

## 2023-06-20 NOTE — ECT PRE-PROCEDURE CHECKLIST - PROCEDURAL AREA
ECT

## 2023-06-20 NOTE — ECT PRE-PROCEDURE CHECKLIST - INV PERIPH IV DEVICE
Angiocath

## 2023-06-20 NOTE — ECT PRE-PROCEDURE CHECKLIST - NSPROPTRIGHTCAREGIVER_GEN_A_NUR
yes

## 2023-06-20 NOTE — ECT TREATMENT NOTE - NSECTNEUROCOMMENT_PSY_ALL_CORE
History of TIA

## 2023-06-20 NOTE — ECT PRE-PROCEDURE CHECKLIST - TO WHOM
-- Message is from the [facility]--    Called and left voicemail to inform patient to call the practice site back to update insurance information below:    Please obtain insurance information    ACC AGENT: When patient calls back:  Do not transfer patient to registration.  Collect missing insurance information to get to the green checkmark and verify insurance (refer to PIE tool).          
ECT Procedure Room RN
TEODORO Jacobs RN
ECT Procedure Room RN
Procedure Room RN
TEODORO Jacobs RN
ECT Procedure Room RN
treating RN
ECT Procedure Room RN
ECT Procedure Room RN

## 2023-06-20 NOTE — ECT PRE-PROCEDURE CHECKLIST - NSMENTALSTATUS_PSY_ALL_CORE
anxious

## 2023-06-20 NOTE — ECT PRE-PROCEDURE CHECKLIST - CAREGIVER RELATION TO PATIENT
Son in Law

## 2023-06-20 NOTE — ECT TREATMENT NOTE - NSECTIMPPLAN_PSY_ALL_CORE
Assessment today offers no contraindications to continue plan of treatment with ECT.

## 2023-06-20 NOTE — ECT PRE-PROCEDURE CHECKLIST - PATIENT'S SEXUAL ORIENTATION
Heterosexual

## 2023-06-20 NOTE — ECT AMBULATORY DISCHARGE PLAN - NSPOSTECTWORSEPSYCHSX_PSY_ALL_CORE
If you are experiencing heightened or worsening psychological symptoms please contact your private psychiatrist.

## 2023-06-20 NOTE — ECT TREATMENT NOTE - NS_RISKASSESSMENTINTER_PSY_ALL_CORE
Low Acute Suicide Risk

## 2023-06-20 NOTE — ECT AMBULATORY DISCHARGE PLAN - NSPOSTECTPROVEDUCFT_PSY_ALL_CORE
COVID EDUCATION
COVID EDUCATION , POST ECT INSTRUCTIONS
Covid Education, Post ECT instructions 
COVID EDUCATION , POST ECT INSTRUCTIONS
COVID EDUCATION, POST ECT INSTRUCTIONS
Covid Education, Post ECT instructions 
Covid  instruction 
COVID TEACHING
COVID EDUCATION
COVID TEACHING
COVID education sheet
COVID EDUCATION, POST ECT INSTRUCTIONS
Covid Education
Covid Instruction 
covid teaching
Covid Education
ECT discharge instruction, covid educational material
post ECT discharge instructions, covid educational material 
post ECT discharge instructions, covid educational material 
COVID TEACHING
Covid Education
Covid Education
post ECT discharge instruction, covid educational material
COVID EDUCATION , POST ECT INSTRUCTIONS
POST ECT INSTRUCTIONS
ect/covid
COVID TEACHING
COVID TEACHING

## 2023-06-20 NOTE — ECT PRE-PROCEDURE CHECKLIST - NSPADDEDSIDERAILS_PSY_ALL_CORE
n/a

## 2023-06-20 NOTE — ECT AMBULATORY DISCHARGE PLAN - NSPOSTECTCALLZHH_PSY_ALL_CORE
Call the [facility] ECT suite at [phone]
Call the [facility] ECT suite at [phone]
Call the [facility] ECT suite at [phone]
Call the [facility] ECT suite at [phone]
Call the [facility] ECT suite at [phone]
Call the [facility] ECT suite at [phone]
Call the [facility] ECT suite at [phone]
Call the [facility] ECT suite at [phone]
Call the [facility] ECT suite at [phone]
Call the [facility] ECT suite at [phone]
Call the [facility] ECT suite at [phone]
Call the [facility] ECT suite at [phone]
Call the [facility] ECT suite at [phone]
Call the [facility] ECT suite at [phone]
Call the [facility] ECT suite at [phone]
Call the [facility] ECT suite at [phone]
Call the [facility] ECT suite at [phone]
Call the [facility] ECT suite at [phone]
Call the [facility] ECT suite at [phone]
Call the [facility] ECT suite at [phone]
Call the [facility] ECT suite at [phone]
Call the [facility] ECT suite at [phone]
Call the [facility] ECT suite at [phone]
Call the [facility] ECT suite at [phone]
Call the [facility] ECT suite at [phone]
Call the [facility] ECT suite at [phone]
Call the [facility] ECT suite at [phone]
Call the [facility] ECT suite at [phone]
Call the [facility] ECT suite at [phone]

## 2023-06-20 NOTE — ECT AMBULATORY DISCHARGE PLAN - NSPROCPERF_PSY_ALL_CORE
Electroconvulsive Therapy (ECT)

## 2023-06-20 NOTE — ECT AMBULATORY DISCHARGE PLAN - NSPOSTECTCASEEMER_PSY_ALL_CORE
[facility]    Referring Provider: Hannah Oconnor APRN  Reason for Consultation: Diabetes evaluation & management.    Patient Care Team:  Hannah Oconnor APRN as PCP - General (Nurse Practitioner)  Felisha Culver MD (Psychiatry)  Addy Vu MD as Consulting Physician (Pulmonary Disease)    Chief complaint Diabetes (type 2)      Subjective .     History of present illness:    This is a  62 y.o. female with type 2 Diabetes diagnosed in February 2020. Felt bad. Blood sugar was >500.  Started on metfomin & Bydureon.  Much improved.  Has an [employer] Guide. Did not bring records.  Attended initial diabetes education assessment in March.   Scheduled classes, but they were cancelled due to the pandemic.  Last eye exam in January.  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 Eats breakfast ~ Noon.  Goes to [facility] weekly since October. None since March due to the pandemic.  Lost 20 lb since starting [facility].  Menopause in the 1990's.    Review of Systems  Review of Systems   Constitutional: Negative for unexpected weight gain.   HENT: Negative for trouble swallowing.    Eyes: Negative for blurred vision.   Respiratory:        Snoring   Gastrointestinal: Positive for diarrhea. Negative for nausea.   Endocrine: Positive for polydipsia. Negative for polyuria.   Skin:        Hair loss   Neurological: Positive for dizziness. Negative for headache.   Psychiatric/Behavioral:        Irritability       History  Past Medical History:   Diagnosis Date   • Anxiety    • Back pain    • Bipolar 1 disorder (CMS/HCC)    • Hyperlipidemia    • Hypothyroidism    • Sleep apnea      Past Surgical History:   Procedure Laterality Date   • CHOLECYSTECTOMY     • REPLACEMENT TOTAL KNEE BILATERAL Bilateral     has had a total of 6 knee surgeries    • WISDOM TOOTH EXTRACTION       Family History   Problem Relation Age of Onset   • Stroke Mother    • Hypothyroidism Mother    • Hypertension Father    • Hypothyroidism Father    • 
In case of any emergency, please go to the nearest emergency room
Heart failure Father    • Diabetes Father    • Stroke Father    • Melanoma Father    • Cancer Father    • No Known Problems Sister    • No Known Problems Maternal Grandmother    • No Known Problems Maternal Grandfather    • No Known Problems Paternal Grandmother    • Heart failure Paternal Grandfather         ?    • No Known Problems Sister      Social History     Tobacco Use   • Smoking status: Former Smoker     Years: 30.00     Types: Cigarettes   • Smokeless tobacco: Never Used   Substance Use Topics   • Alcohol use: Yes     Frequency: Monthly or less     Comment: socially   • Drug use: No     PRN Meds:    Allergies:  Penicillins    Objective     Vital Signs       Vitals:    06/17/20 1347   BP: 142/72   Pulse: 86   Temp: 97.3 °F (36.3 °C)   SpO2: 96%         Physical Exam:     General Appearance:    Alert, cooperative, in no acute distress. Obese   Head:    Normocephalic, without obvious abnormality, atraumatic   Eyes:            Lids and lashes normal, conjunctivae and sclerae normal, no   icterus, periorbital edema   Throat:   No oral lesions,  oral mucosa moist   Neck:   No adenopathy, supple,  no thyromegaly, no   carotid bruits   Lungs:     Decreased breath sounds    Heart:    Regular rhythm and normal rate   Chest Wall:    No abnormalities observed   Abdomen:     Normal bowel sounds, soft, obese                 Extremities:   Moves all extremities well, no edema               Pulses:   Pulses palpable and equal bilaterally   Skin:   Dry. Scars both knees from previous surgeries   Neurologic:  DTR absent in ankles, vibratory sense 25% decreased in toes, able to feel the 10g monofilament       Results Review  I have reviewed the patient's new clinical results, labs & imaging.    A1C 9.2% on 2/4/2020.    Lab Results (last 24 hours)     ** No results found for the last 24 hours. **        Lab Results   Component Value Date    HGBA1C 5.4 05/12/2020     Lab Results   Component Value Date    TSH 0.446 05/12/2020 
In case of any emergency, please go to the nearest emergency room
    Cr 0.71, K 4.7, ALT 30; random microalb <1.2    Assessment/Plan     1. Diabetes type 2  2. Hypothyroidism   3. Hyperlipidemia    Increase exercise as planned.  Reschedule diabetes education classes.  Check blood sugar before meals & bedtime 2 d / week. Record.  Call if blood sugars are running over 160.  Continue current meds.    I discussed the patients findings and my recommendations with patient    Jovita Conde MD  06/21/20  18:06            
In case of any emergency, please go to the nearest emergency room

## 2023-06-20 NOTE — ECT TREATMENT NOTE - NSECTRENUROCOMMENT_PSY_ALL_CORE
Chronic kidney disease stage III

## 2023-06-20 NOTE — ECT PRE-PROCEDURE CHECKLIST - AS BP NONINV METHOD
electronic

## 2023-06-20 NOTE — ECT PRE-PROCEDURE CHECKLIST - NSPROPTRIGHTSUPPORTPERSON_GEN_A_NUR
same name as above

## 2023-06-20 NOTE — ECT PRE-PROCEDURE CHECKLIST - NSPROEDAABILITYLEARN_GEN_A_NUR
anxiety
anxiety/hearing problems
anxiety
anxiety/hearing problems
anxiety
anxiety
anxiety/hearing problems
anxiety

## 2023-08-07 PROCEDURE — 99442: CPT | Mod: 95

## 2023-08-24 ENCOUNTER — RX RENEWAL (OUTPATIENT)
Age: 88
End: 2023-08-24

## 2023-09-25 NOTE — ECT OUTPATIENT PROGRAM DISCHARGE SUMMARY - NSECTTREATMENTSUMMARY_PSY_ALL_CORE
The patient underwent a grand total of 59 RUL treatments including stable maintenance treatments. Her cognition score at her last visit was 7/10 and her daughter noted that the patient was having memory issues; they chose to withdraw from treatment.

## 2023-10-06 PROCEDURE — 99214 OFFICE O/P EST MOD 30 MIN: CPT

## 2023-12-05 PROCEDURE — 99443: CPT | Mod: 95

## 2024-01-05 NOTE — ED ADULT NURSE NOTE - PAIN RATING/NUMBER SCALE (0-10): ACTIVITY
[FreeTextEntry1] : cough has decreased after stopping all meds and given famotidine 20mg 2x/day for 2 weeks  cough is now minimal  no fits anymore  runs and plays soccer   [Wheezing] : wheezing [Difficulty Breathing During Exertion] : dyspnea on exertion [Wheezing Only When Breathing In] : stridor [Nasal Passage Blockage (Stuffiness)] : nasal congestion [Nasal Discharge From Both Nostrils] : runny nose [Snoring] : snoring [Fever] : fever [Sweating Heavily At Night] : night sweats [Nonspecific Pain, Swelling, And Stiffness] : pain [Feelings Of Weakness On Exertion] : exercise intolerance [Coughing Up Sputum] : sputum production [Coughing Up Blood (Hemoptysis)] : hemoptysis [Cough] : coughing [de-identified] : for the past 2 to 3 weeks , the following symptoms were /were not observed  0

## 2024-01-12 ENCOUNTER — RX RENEWAL (OUTPATIENT)
Age: 89
End: 2024-01-12

## 2024-02-01 PROCEDURE — 99214 OFFICE O/P EST MOD 30 MIN: CPT

## 2024-02-23 ENCOUNTER — RX RENEWAL (OUTPATIENT)
Age: 89
End: 2024-02-23

## 2024-02-23 RX ORDER — LISINOPRIL 10 MG/1
10 TABLET ORAL
Qty: 90 | Refills: 2 | Status: ACTIVE | COMMUNITY
Start: 2019-09-23 | End: 1900-01-01

## 2024-04-12 ENCOUNTER — RX RENEWAL (OUTPATIENT)
Age: 89
End: 2024-04-12

## 2024-04-12 RX ORDER — PRAVASTATIN SODIUM 80 MG/1
80 TABLET ORAL
Qty: 90 | Refills: 3 | Status: ACTIVE | COMMUNITY
Start: 2017-04-03 | End: 1900-01-01

## 2024-05-02 PROCEDURE — 99214 OFFICE O/P EST MOD 30 MIN: CPT

## 2024-05-06 ENCOUNTER — APPOINTMENT (OUTPATIENT)
Dept: GERIATRICS | Facility: CLINIC | Age: 89
End: 2024-05-06
Payer: MEDICARE

## 2024-05-06 VITALS
WEIGHT: 135 LBS | BODY MASS INDEX: 25.49 KG/M2 | HEART RATE: 60 BPM | RESPIRATION RATE: 12 BRPM | SYSTOLIC BLOOD PRESSURE: 118 MMHG | HEIGHT: 61 IN | DIASTOLIC BLOOD PRESSURE: 80 MMHG | TEMPERATURE: 97.2 F | OXYGEN SATURATION: 96 %

## 2024-05-06 DIAGNOSIS — I10 ESSENTIAL (PRIMARY) HYPERTENSION: ICD-10-CM

## 2024-05-06 DIAGNOSIS — E03.9 HYPOTHYROIDISM, UNSPECIFIED: ICD-10-CM

## 2024-05-06 DIAGNOSIS — R41.89 OTHER SYMPTOMS AND SIGNS INVOLVING COGNITIVE FUNCTIONS AND AWARENESS: ICD-10-CM

## 2024-05-06 DIAGNOSIS — E78.5 HYPERLIPIDEMIA, UNSPECIFIED: ICD-10-CM

## 2024-05-06 DIAGNOSIS — F41.8 OTHER SPECIFIED ANXIETY DISORDERS: ICD-10-CM

## 2024-05-06 PROCEDURE — G2211 COMPLEX E/M VISIT ADD ON: CPT

## 2024-05-06 PROCEDURE — 99214 OFFICE O/P EST MOD 30 MIN: CPT

## 2024-05-06 RX ORDER — FLUOXETINE HYDROCHLORIDE 40 MG/1
40 CAPSULE ORAL
Qty: 90 | Refills: 2 | Status: ACTIVE | COMMUNITY
Start: 2024-05-06

## 2024-05-06 NOTE — REVIEW OF SYSTEMS
[Fever] : no fever [Chills] : no chills [Feeling Poorly] : not feeling poorly [Feeling Tired] : not feeling tired [Recent Weight Loss (___ Lbs)] : no recent weight loss [Eye Pain] : no eye pain [Loss Of Hearing] : hearing loss [Nosebleeds] : no nosebleeds [Heart Rate Is Slow] : the heart rate was not slow [Heart Rate Is Fast] : the heart rate was not fast [Chest Pain] : no chest pain [Palpitations] : no palpitations [Leg Claudication] : no intermittent leg claudication [Lower Ext Edema] : no lower extremity edema [Shortness Of Breath] : no shortness of breath [Wheezing] : no wheezing [Cough] : no cough [SOB on Exertion] : no shortness of breath during exertion [Orthopnea] : no orthopnea [Abdominal Pain] : no abdominal pain [Vomiting] : no vomiting [Constipation] : no constipation [Dysuria] : no dysuria [Incontinence] : incontinence [As Noted in HPI] : as noted in HPI [Muscle Weakness] : no muscle weakness [Negative] : Heme/Lymph

## 2024-05-06 NOTE — ASSESSMENT
[FreeTextEntry1] : HTN: controlled c/w current medications  anxiety and depression: stable follows with psych c/w current medications check labowrk  hld: check lipids c/w pravastatin  hypothyroidism: repeat tsh c/w levothyroxine  impaired gait: ambulates independently overall slower,but declines PT referral

## 2024-05-06 NOTE — PHYSICAL EXAM
[Alert] : alert [Sclera] : the sclera and conjunctiva were normal [EOMI] : extraocular movements were intact [Normal Outer Ear/Nose] : the ears and nose were normal in appearance [Normal Appearance] : the appearance of the neck was normal [Supple] : the neck was supple [No Respiratory Distress] : no respiratory distress [No Acc Muscle Use] : no accessory muscle use [Respiration, Rhythm And Depth] : normal respiratory rhythm and effort [Auscultation Breath Sounds / Voice Sounds] : lungs were clear to auscultation bilaterally [Normal S1, S2] : normal S1 and S2 [Murmurs] : no murmurs [Heart Rate And Rhythm] : heart rate was normal and rhythm regular [Edema] : edema was not present [Abdomen Tenderness] : non-tender [Abdomen Soft] : soft [No Spinal Tenderness] : no spinal tenderness [No Clubbing, Cyanosis] : no clubbing or cyanosis of the fingernails [Involuntary Movements] : no involuntary movements were seen [Motor Tone] : muscle strength and tone were normal [Normal Color / Pigmentation] : normal skin color and pigmentation [No Focal Deficits] : no focal deficits [de-identified] : anxious [de-identified] : anxious, repetitive

## 2024-05-06 NOTE — HISTORY OF PRESENT ILLNESS
[FreeTextEntry1] : 87yo F with PMHx of Anxiety and Depression, CKD, cognitive impairment, accompanied by her dtr for follow up.  Pt is followed by psychiatry Dr. Fortunato Guillen.  she is no longer going for ECT. overall her anxiety is controlled with her current medications, there have been no changes in her medications  Pt is relatively independent with ADLs. She cooks, cleans, baths, uses the bathroom independently. Lives at home with her daughter, son-in-law and 2 twin granddaughters.  Has urinary incontinence wears pad and pullup.  Denies dysuria   Denies falls, constipation/diarrhea, or joint pain.  Memory loss- stable but still able to manage ADL's  more sedentary, wanting to spend more time in bed.  eating well sleeping okay      [No falls in past year] : Patient reported no falls in the past year [Patient is independent with] : bathing [Independent] : transferring/mobility [Full assistance needed] : Assistance needed managing medications [] : Assistance needed managing finances. [FreeTextEntry8] : Incontinent

## 2024-05-07 ENCOUNTER — LABORATORY RESULT (OUTPATIENT)
Age: 89
End: 2024-05-07

## 2024-05-10 LAB
25(OH)D3 SERPL-MCNC: 45.6 NG/ML
ALBUMIN SERPL ELPH-MCNC: 4.3 G/DL
ALP BLD-CCNC: 67 U/L
ALT SERPL-CCNC: 6 U/L
ANION GAP SERPL CALC-SCNC: 14 MMOL/L
AST SERPL-CCNC: 12 U/L
BILIRUB SERPL-MCNC: 0.4 MG/DL
BUN SERPL-MCNC: 24 MG/DL
CALCIUM SERPL-MCNC: 9.9 MG/DL
CHLORIDE SERPL-SCNC: 100 MMOL/L
CHOLEST SERPL-MCNC: 169 MG/DL
CO2 SERPL-SCNC: 23 MMOL/L
CREAT SERPL-MCNC: 1.65 MG/DL
EGFR: 30 ML/MIN/1.73M2
FOLATE SERPL-MCNC: 10.5 NG/ML
GLUCOSE SERPL-MCNC: 137 MG/DL
HDLC SERPL-MCNC: 64 MG/DL
LDLC SERPL CALC-MCNC: 85 MG/DL
NONHDLC SERPL-MCNC: 104 MG/DL
POTASSIUM SERPL-SCNC: 4.1 MMOL/L
PROT SERPL-MCNC: 6.7 G/DL
SODIUM SERPL-SCNC: 137 MMOL/L
TRIGL SERPL-MCNC: 107 MG/DL
TSH SERPL-ACNC: 6.53 UIU/ML
VIT B12 SERPL-MCNC: 572 PG/ML

## 2024-05-21 ENCOUNTER — RX RENEWAL (OUTPATIENT)
Age: 89
End: 2024-05-21

## 2024-09-03 ENCOUNTER — RX RENEWAL (OUTPATIENT)
Age: 89
End: 2024-09-03

## 2024-09-12 PROCEDURE — 99214 OFFICE O/P EST MOD 30 MIN: CPT

## 2024-10-15 NOTE — HISTORY OF PRESENT ILLNESS
[FreeTextEntry1] : Follow Up  [de-identified] : 84 years old female with PMH of Depression and Anxiety, HTN, HLD, basal cell CA s/p MOHs, and hypothyroidism who presents for routine follow up with her oldest dtr.\par She has undergone 2 sessions of ECT with Dr. Haq with plans to continue through October, with improvement of depression including crying episodes. She has also been increased to Fluoxetine 30 mg, and is tolerating increased dosage well. Denies HA, dizziness, chest pain palpitations, abd pain or diarrhea. \par \par Closely followed by Ophthalmologist for treatment of Glaucoma.  \par \par Son-In-Law Ihsan ensures medication adherence. Review of daily BP log indicate persistently elevated -160, w isolated reading 's intermittently. Asymptomatic otherwise. Tolerating increased dose of Levothyroxine after found to have elevated TSH (7.26) during last visit.  No

## 2024-12-12 PROCEDURE — 99214 OFFICE O/P EST MOD 30 MIN: CPT

## 2024-12-17 ENCOUNTER — APPOINTMENT (OUTPATIENT)
Age: 89
End: 2024-12-17
Payer: MEDICARE

## 2024-12-17 VITALS
HEIGHT: 61 IN | SYSTOLIC BLOOD PRESSURE: 115 MMHG | WEIGHT: 135 LBS | DIASTOLIC BLOOD PRESSURE: 65 MMHG | RESPIRATION RATE: 14 BRPM | OXYGEN SATURATION: 97 % | HEART RATE: 80 BPM | BODY MASS INDEX: 25.49 KG/M2

## 2024-12-17 DIAGNOSIS — E03.9 HYPOTHYROIDISM, UNSPECIFIED: ICD-10-CM

## 2024-12-17 DIAGNOSIS — F03.90 UNSPECIFIED DEMENTIA W/OUT BEHAVIORAL DISTURBANCE: ICD-10-CM

## 2024-12-17 DIAGNOSIS — N18.30 CHRONIC KIDNEY DISEASE, STAGE 3 UNSPECIFIED: ICD-10-CM

## 2024-12-17 DIAGNOSIS — F41.8 OTHER SPECIFIED ANXIETY DISORDERS: ICD-10-CM

## 2024-12-17 DIAGNOSIS — D64.9 ANEMIA, UNSPECIFIED: ICD-10-CM

## 2024-12-17 DIAGNOSIS — I10 ESSENTIAL (PRIMARY) HYPERTENSION: ICD-10-CM

## 2024-12-17 DIAGNOSIS — R26.89 OTHER ABNORMALITIES OF GAIT AND MOBILITY: ICD-10-CM

## 2024-12-17 PROCEDURE — 99214 OFFICE O/P EST MOD 30 MIN: CPT

## 2024-12-17 PROCEDURE — G2211 COMPLEX E/M VISIT ADD ON: CPT

## 2024-12-17 PROCEDURE — 90662 IIV NO PRSV INCREASED AG IM: CPT

## 2024-12-17 PROCEDURE — G0008: CPT

## 2024-12-26 LAB
ALBUMIN SERPL ELPH-MCNC: 3.9 G/DL
ALP BLD-CCNC: 62 U/L
ALT SERPL-CCNC: 8 U/L
ANION GAP SERPL CALC-SCNC: 13 MMOL/L
AST SERPL-CCNC: 7 U/L
BILIRUB SERPL-MCNC: 0.4 MG/DL
BUN SERPL-MCNC: 21 MG/DL
CALCIUM SERPL-MCNC: 9.9 MG/DL
CHLORIDE SERPL-SCNC: 102 MMOL/L
CHOLEST SERPL-MCNC: 161 MG/DL
CO2 SERPL-SCNC: 22 MMOL/L
CREAT SERPL-MCNC: 1.6 MG/DL
EGFR: 31 ML/MIN/1.73M2
GLUCOSE SERPL-MCNC: 133 MG/DL
HCT VFR BLD CALC: 34.3 %
HDLC SERPL-MCNC: 53 MG/DL
HGB BLD-MCNC: 11.3 G/DL
LDLC SERPL CALC-MCNC: 88 MG/DL
MCHC RBC-ENTMCNC: 30 PG
MCHC RBC-ENTMCNC: 32.9 G/DL
MCV RBC AUTO: 91 FL
NONHDLC SERPL-MCNC: 109 MG/DL
PLATELET # BLD AUTO: 279 K/UL
POTASSIUM SERPL-SCNC: 4.4 MMOL/L
PROT SERPL-MCNC: 6.3 G/DL
RBC # BLD: 3.77 M/UL
RBC # FLD: 13.2 %
SODIUM SERPL-SCNC: 137 MMOL/L
TRIGL SERPL-MCNC: 113 MG/DL
TSH SERPL-ACNC: 3.38 UIU/ML
WBC # FLD AUTO: 10.95 K/UL

## 2025-02-11 ENCOUNTER — RX RENEWAL (OUTPATIENT)
Age: 89
End: 2025-02-11

## 2025-02-25 NOTE — ED ADULT NURSE NOTE - CARDIO ASSESSMENT
"Chief Complaint  Hypertension, Hyperlipidemia, and Diabetes    Subjective          Hypertension    Hyperlipidemia    Diabetes      History of Present Illness    Heidi Smith 68 y.o. female presents for medical management. Since the last visit, she has overall felt well. She has Primary Hypertension not at goal, plan to add/adjust medication, DMII well controlled on medication and will continue regimen, and Hyperlipidemia with goals met with current Rx. She has been compliant with current medications, and I have reviewed them. The patient denies medication side effects. Will refill medications.             Objective   Vital Signs:   /80 (BP Location: Left arm, Patient Position: Sitting, Cuff Size: Large Adult)   Pulse 84   Temp 98 °F (36.7 °C) (Oral)   Ht 157.5 cm (62.01\")   Wt 122 kg (268 lb 3.2 oz)   SpO2 96%   BMI 49.04 kg/m²      Class 3 Severe Obesity (BMI >=40). Obesity-related health conditions include the following: hypertension, diabetes mellitus, dyslipidemias, and osteoarthritis. Obesity is worsening. BMI is is above average; BMI management plan is completed. We discussed low calorie, low carb based diet program, portion control, and increasing exercise.       Physical Exam  Vitals and nursing note reviewed.   Constitutional:       General: She is not in acute distress.     Appearance: She is well-developed. She is obese.   HENT:      Head: Normocephalic.   Eyes:      General: No scleral icterus.     Pupils: Pupils are equal, round, and reactive to light.   Neck:      Vascular: No carotid bruit.   Cardiovascular:      Rate and Rhythm: Normal rate and regular rhythm.      Pulses: Normal pulses.      Heart sounds: Normal heart sounds.   Pulmonary:      Effort: Pulmonary effort is normal. No respiratory distress.      Breath sounds: Normal breath sounds. No stridor.   Skin:     General: Skin is warm.   Neurological:      General: No focal deficit present.      Mental Status: She is alert and " oriented to person, place, and time.      Gait: Gait normal.   Psychiatric:         Mood and Affect: Mood normal.        Physical Exam      The following data was reviewed by: CAR Gutierrez on 02/25/2025:  Hemoglobin A1c (01/28/2025 10:55)   Lipid panel (08/20/2024 10:26)   Basic Metabolic Panel (01/28/2025 10:55)   Results                 Assessment and Plan    Assessment & Plan      Assessment & Plan  Essential (primary) hypertension  Well-controlled  Continue current regimen  Low-sodium diet, exercise, weight loss  Follow-up in 6 months    Orders:    Comprehensive metabolic panel    Lipid panel    CBC and Differential    TSH    Microalbumin / Creatinine Urine Ratio - Urine, Clean Catch    Hepatitis C Antibody    amLODIPine (NORVASC) 10 MG tablet; Take 1 tablet by mouth Every Morning.    furosemide (LASIX) 40 MG tablet; Take 1 tablet by mouth 2 (Two) Times a Day.    nebivolol (BYSTOLIC) 20 MG tablet; Take 1 tablet by mouth Daily.    olmesartan (BENICAR) 40 MG tablet; Take 1 tablet by mouth Every Morning.    potassium chloride (Klor-Con M20) 20 MEQ CR tablet; Take 1 tablet by mouth Daily.    Pure hypercholesterolemia  LDL is slightly above goal of less than 70  Continue current regimen, low-cholesterol diet, exercise, weight loss  Lipid panel today  Follow-up in 6 months    Orders:    Lipid panel    atorvastatin (LIPITOR) 80 MG tablet; Take 1 tablet by mouth Every Night.    Type 2 diabetes mellitus with hyperglycemia, without long-term current use of insulin  A1c is normal  Continue low-carb/no sweets diet, exercise, weight loss  A1c today  Follow-up in 6 months    Orders:    Comprehensive metabolic panel    Microalbumin / Creatinine Urine Ratio - Urine, Clean Catch    Hemoglobin A1c             Follow Up     Return in about 6 months (around 8/25/2025) for Medicare Wellness.    Patient was given instructions and counseling regarding her condition or for health maintenance advice. Please see specific  information pulled into the AVS if appropriate.      WDL

## 2025-04-04 ENCOUNTER — RX RENEWAL (OUTPATIENT)
Age: 89
End: 2025-04-04

## 2025-04-28 ENCOUNTER — NON-APPOINTMENT (OUTPATIENT)
Age: 89
End: 2025-04-28

## 2025-04-30 ENCOUNTER — APPOINTMENT (OUTPATIENT)
Age: 89
End: 2025-04-30
Payer: MEDICARE

## 2025-04-30 DIAGNOSIS — R26.89 OTHER ABNORMALITIES OF GAIT AND MOBILITY: ICD-10-CM

## 2025-04-30 DIAGNOSIS — F41.8 OTHER SPECIFIED ANXIETY DISORDERS: ICD-10-CM

## 2025-04-30 DIAGNOSIS — F03.90 UNSPECIFIED DEMENTIA W/OUT BEHAVIORAL DISTURBANCE: ICD-10-CM

## 2025-04-30 PROCEDURE — 99214 OFFICE O/P EST MOD 30 MIN: CPT | Mod: 2W

## 2025-04-30 PROCEDURE — G2211 COMPLEX E/M VISIT ADD ON: CPT | Mod: 2W

## 2025-06-03 PROCEDURE — 99214 OFFICE O/P EST MOD 30 MIN: CPT | Mod: 93

## 2025-07-15 PROCEDURE — 99213 OFFICE O/P EST LOW 20 MIN: CPT | Mod: 93,95

## 2025-07-21 ENCOUNTER — NON-APPOINTMENT (OUTPATIENT)
Age: 89
End: 2025-07-21

## 2025-07-21 RX ORDER — GABAPENTIN 100 MG/1
100 CAPSULE ORAL
Qty: 60 | Refills: 1 | Status: ACTIVE | COMMUNITY
Start: 2025-07-21 | End: 1900-01-01

## 2025-09-20 ENCOUNTER — NON-APPOINTMENT (OUTPATIENT)
Age: 89
End: 2025-09-20